# Patient Record
Sex: FEMALE | Race: WHITE | Employment: UNEMPLOYED | URBAN - METROPOLITAN AREA
[De-identification: names, ages, dates, MRNs, and addresses within clinical notes are randomized per-mention and may not be internally consistent; named-entity substitution may affect disease eponyms.]

---

## 2023-10-13 ENCOUNTER — ANESTHESIA (OUTPATIENT)
Dept: OPERATING ROOM | Facility: HOSPITAL | Age: 58
DRG: 331 | End: 2023-10-13
Payer: COMMERCIAL

## 2023-10-13 ENCOUNTER — HOSPITAL ENCOUNTER (INPATIENT)
Facility: HOSPITAL | Age: 58
LOS: 6 days | Discharge: HOME | DRG: 331 | End: 2023-10-20
Attending: EMERGENCY MEDICINE | Admitting: SURGERY
Payer: COMMERCIAL

## 2023-10-13 ENCOUNTER — ANESTHESIA EVENT (OUTPATIENT)
Dept: OPERATING ROOM | Facility: HOSPITAL | Age: 58
DRG: 331 | End: 2023-10-13
Payer: COMMERCIAL

## 2023-10-13 ENCOUNTER — APPOINTMENT (OUTPATIENT)
Dept: RADIOLOGY | Facility: HOSPITAL | Age: 58
DRG: 331 | End: 2023-10-13
Payer: COMMERCIAL

## 2023-10-13 DIAGNOSIS — K63.89 COLONIC MASS: ICD-10-CM

## 2023-10-13 DIAGNOSIS — K56.609 COLONIC OBSTRUCTION (MULTI): ICD-10-CM

## 2023-10-13 DIAGNOSIS — K56.609 LARGE BOWEL OBSTRUCTION (MULTI): Primary | ICD-10-CM

## 2023-10-13 DIAGNOSIS — K56.609: ICD-10-CM

## 2023-10-13 PROBLEM — E03.9 HYPOTHYROIDISM: Status: ACTIVE | Noted: 2023-10-13

## 2023-10-13 LAB
ALBUMIN SERPL BCP-MCNC: 4.7 G/DL (ref 3.4–5)
ALP SERPL-CCNC: 60 U/L (ref 33–110)
ALT SERPL W P-5'-P-CCNC: 6 U/L (ref 7–45)
ANION GAP SERPL CALC-SCNC: 14 MMOL/L (ref 10–20)
APPEARANCE UR: ABNORMAL
AST SERPL W P-5'-P-CCNC: 9 U/L (ref 9–39)
BASOPHILS # BLD AUTO: 0.04 X10*3/UL (ref 0–0.1)
BASOPHILS NFR BLD AUTO: 0.4 %
BILIRUB DIRECT SERPL-MCNC: 0.2 MG/DL (ref 0–0.3)
BILIRUB SERPL-MCNC: 1 MG/DL (ref 0–1.2)
BILIRUB UR STRIP.AUTO-MCNC: NEGATIVE MG/DL
BUN SERPL-MCNC: 29 MG/DL (ref 6–23)
CALCIUM SERPL-MCNC: 10.3 MG/DL (ref 8.6–10.3)
CHLORIDE SERPL-SCNC: 99 MMOL/L (ref 98–107)
CO2 SERPL-SCNC: 26 MMOL/L (ref 21–32)
COLOR UR: ABNORMAL
CREAT SERPL-MCNC: 0.6 MG/DL (ref 0.5–1.05)
EOSINOPHIL # BLD AUTO: 0.04 X10*3/UL (ref 0–0.7)
EOSINOPHIL NFR BLD AUTO: 0.4 %
ERYTHROCYTE [DISTWIDTH] IN BLOOD BY AUTOMATED COUNT: 14 % (ref 11.5–14.5)
GFR SERPL CREATININE-BSD FRML MDRD: >90 ML/MIN/1.73M*2
GLUCOSE SERPL-MCNC: 96 MG/DL (ref 74–99)
GLUCOSE UR STRIP.AUTO-MCNC: NEGATIVE MG/DL
HCT VFR BLD AUTO: 44.9 % (ref 36–46)
HGB BLD-MCNC: 15.1 G/DL (ref 12–16)
HOLD SPECIMEN: NORMAL
IMM GRANULOCYTES # BLD AUTO: 0.04 X10*3/UL (ref 0–0.7)
IMM GRANULOCYTES NFR BLD AUTO: 0.4 % (ref 0–0.9)
KETONES UR STRIP.AUTO-MCNC: ABNORMAL MG/DL
LEUKOCYTE ESTERASE UR QL STRIP.AUTO: NEGATIVE
LIPASE SERPL-CCNC: 27 U/L (ref 9–82)
LYMPHOCYTES # BLD AUTO: 1.91 X10*3/UL (ref 1.2–4.8)
LYMPHOCYTES NFR BLD AUTO: 18.3 %
MCH RBC QN AUTO: 29.2 PG (ref 26–34)
MCHC RBC AUTO-ENTMCNC: 33.6 G/DL (ref 32–36)
MCV RBC AUTO: 87 FL (ref 80–100)
MONOCYTES # BLD AUTO: 0.88 X10*3/UL (ref 0.1–1)
MONOCYTES NFR BLD AUTO: 8.4 %
MUCOUS THREADS #/AREA URNS AUTO: NORMAL /LPF
NEUTROPHILS # BLD AUTO: 7.55 X10*3/UL (ref 1.2–7.7)
NEUTROPHILS NFR BLD AUTO: 72.1 %
NITRITE UR QL STRIP.AUTO: NEGATIVE
NRBC BLD-RTO: 0 /100 WBCS (ref 0–0)
PH UR STRIP.AUTO: 5 [PH]
PLATELET # BLD AUTO: 380 X10*3/UL (ref 150–450)
PMV BLD AUTO: 8.7 FL (ref 7.5–11.5)
POTASSIUM SERPL-SCNC: 3.4 MMOL/L (ref 3.5–5.3)
PROT SERPL-MCNC: 7.8 G/DL (ref 6.4–8.2)
PROT UR STRIP.AUTO-MCNC: ABNORMAL MG/DL
RBC # BLD AUTO: 5.18 X10*6/UL (ref 4–5.2)
RBC # UR STRIP.AUTO: NEGATIVE /UL
RBC #/AREA URNS AUTO: NORMAL /HPF
SODIUM SERPL-SCNC: 136 MMOL/L (ref 136–145)
SP GR UR STRIP.AUTO: 1.03
SQUAMOUS #/AREA URNS AUTO: NORMAL /HPF
UROBILINOGEN UR STRIP.AUTO-MCNC: 2 MG/DL
WBC # BLD AUTO: 10.5 X10*3/UL (ref 4.4–11.3)
WBC #/AREA URNS AUTO: NORMAL /HPF

## 2023-10-13 PROCEDURE — 96376 TX/PRO/DX INJ SAME DRUG ADON: CPT

## 2023-10-13 PROCEDURE — 82248 BILIRUBIN DIRECT: CPT | Performed by: EMERGENCY MEDICINE

## 2023-10-13 PROCEDURE — 81001 URINALYSIS AUTO W/SCOPE: CPT | Performed by: EMERGENCY MEDICINE

## 2023-10-13 PROCEDURE — 99285 EMERGENCY DEPT VISIT HI MDM: CPT | Mod: 25 | Performed by: EMERGENCY MEDICINE

## 2023-10-13 PROCEDURE — 96374 THER/PROPH/DIAG INJ IV PUSH: CPT | Mod: 59

## 2023-10-13 PROCEDURE — 2500000004 HC RX 250 GENERAL PHARMACY W/ HCPCS (ALT 636 FOR OP/ED): Performed by: NURSE ANESTHETIST, CERTIFIED REGISTERED

## 2023-10-13 PROCEDURE — 96361 HYDRATE IV INFUSION ADD-ON: CPT

## 2023-10-13 PROCEDURE — 85025 COMPLETE CBC W/AUTO DIFF WBC: CPT | Performed by: EMERGENCY MEDICINE

## 2023-10-13 PROCEDURE — 2500000005 HC RX 250 GENERAL PHARMACY W/O HCPCS: Performed by: NURSE ANESTHETIST, CERTIFIED REGISTERED

## 2023-10-13 PROCEDURE — 3600000003 HC OR TIME - INITIAL BASE CHARGE - PROCEDURE LEVEL THREE: Performed by: SURGERY

## 2023-10-13 PROCEDURE — 2500000004 HC RX 250 GENERAL PHARMACY W/ HCPCS (ALT 636 FOR OP/ED): Performed by: EMERGENCY MEDICINE

## 2023-10-13 PROCEDURE — 74176 CT ABD & PELVIS W/O CONTRAST: CPT

## 2023-10-13 PROCEDURE — 81003 URINALYSIS AUTO W/O SCOPE: CPT | Performed by: EMERGENCY MEDICINE

## 2023-10-13 PROCEDURE — 7100000002 HC RECOVERY ROOM TIME - EACH INCREMENTAL 1 MINUTE: Performed by: SURGERY

## 2023-10-13 PROCEDURE — 3600000008 HC OR TIME - EACH INCREMENTAL 1 MINUTE - PROCEDURE LEVEL THREE: Performed by: SURGERY

## 2023-10-13 PROCEDURE — 80048 BASIC METABOLIC PNL TOTAL CA: CPT | Performed by: EMERGENCY MEDICINE

## 2023-10-13 PROCEDURE — C1725 CATH, TRANSLUMIN NON-LASER: HCPCS | Performed by: SURGERY

## 2023-10-13 PROCEDURE — 3700000002 HC GENERAL ANESTHESIA TIME - EACH INCREMENTAL 1 MINUTE: Performed by: SURGERY

## 2023-10-13 PROCEDURE — 74176 CT ABD & PELVIS W/O CONTRAST: CPT | Performed by: RADIOLOGY

## 2023-10-13 PROCEDURE — 2720000007 HC OR 272 NO HCPCS: Performed by: SURGERY

## 2023-10-13 PROCEDURE — 80053 COMPREHEN METABOLIC PANEL: CPT | Performed by: EMERGENCY MEDICINE

## 2023-10-13 PROCEDURE — 2580000001 HC RX 258 IV SOLUTIONS: Performed by: NURSE ANESTHETIST, CERTIFIED REGISTERED

## 2023-10-13 PROCEDURE — 96375 TX/PRO/DX INJ NEW DRUG ADDON: CPT

## 2023-10-13 PROCEDURE — 7100000001 HC RECOVERY ROOM TIME - INITIAL BASE CHARGE: Performed by: SURGERY

## 2023-10-13 PROCEDURE — 3700000001 HC GENERAL ANESTHESIA TIME - INITIAL BASE CHARGE: Performed by: SURGERY

## 2023-10-13 PROCEDURE — 2500000004 HC RX 250 GENERAL PHARMACY W/ HCPCS (ALT 636 FOR OP/ED): Performed by: SURGERY

## 2023-10-13 PROCEDURE — 96372 THER/PROPH/DIAG INJ SC/IM: CPT

## 2023-10-13 PROCEDURE — 83690 ASSAY OF LIPASE: CPT | Performed by: EMERGENCY MEDICINE

## 2023-10-13 PROCEDURE — 36415 COLL VENOUS BLD VENIPUNCTURE: CPT | Performed by: EMERGENCY MEDICINE

## 2023-10-13 RX ORDER — ONDANSETRON HYDROCHLORIDE 2 MG/ML
4 INJECTION, SOLUTION INTRAVENOUS ONCE
Status: COMPLETED | OUTPATIENT
Start: 2023-10-13 | End: 2023-10-13

## 2023-10-13 RX ORDER — DICYCLOMINE HYDROCHLORIDE 10 MG/ML
20 INJECTION INTRAMUSCULAR ONCE
Status: COMPLETED | OUTPATIENT
Start: 2023-10-13 | End: 2023-10-13

## 2023-10-13 RX ADMIN — PROPOFOL 200 MG: 10 INJECTION, EMULSION INTRAVENOUS at 23:50

## 2023-10-13 RX ADMIN — CEFOXITIN SODIUM 2 G: 2 POWDER, FOR SOLUTION INTRAVENOUS at 23:53

## 2023-10-13 RX ADMIN — LIDOCAINE HYDROCHLORIDE 60 MG: 20 INJECTION, SOLUTION INFILTRATION; PERINEURAL at 23:50

## 2023-10-13 RX ADMIN — SODIUM CHLORIDE, SODIUM LACTATE, POTASSIUM CHLORIDE, AND CALCIUM CHLORIDE: 600; 310; 30; 20 INJECTION, SOLUTION INTRAVENOUS at 23:45

## 2023-10-13 RX ADMIN — SODIUM CHLORIDE 1000 ML: 9 INJECTION, SOLUTION INTRAVENOUS at 18:44

## 2023-10-13 RX ADMIN — FENTANYL CITRATE 50 MCG: 50 INJECTION, SOLUTION INTRAMUSCULAR; INTRAVENOUS at 23:50

## 2023-10-13 RX ADMIN — ONDANSETRON 4 MG: 2 INJECTION INTRAMUSCULAR; INTRAVENOUS at 18:44

## 2023-10-13 RX ADMIN — DICYCLOMINE HYDROCHLORIDE 20 MG: 10 INJECTION, SOLUTION INTRAMUSCULAR at 19:11

## 2023-10-13 RX ADMIN — SODIUM CHLORIDE 1000 ML: 9 INJECTION, SOLUTION INTRAVENOUS at 20:11

## 2023-10-13 RX ADMIN — ROCURONIUM BROMIDE 50 MG: 50 INJECTION INTRAVENOUS at 23:50

## 2023-10-13 SDOH — HEALTH STABILITY: MENTAL HEALTH: CURRENT SMOKER: 0

## 2023-10-13 ASSESSMENT — PAIN DESCRIPTION - LOCATION
LOCATION: ABDOMEN
LOCATION: ABDOMEN

## 2023-10-13 ASSESSMENT — PAIN DESCRIPTION - PROGRESSION
CLINICAL_PROGRESSION: NOT CHANGED
CLINICAL_PROGRESSION: GRADUALLY WORSENING
CLINICAL_PROGRESSION: GRADUALLY IMPROVING

## 2023-10-13 ASSESSMENT — ENCOUNTER SYMPTOMS
VOMITING: 1
NAUSEA: 1
ABDOMINAL PAIN: 1
FATIGUE: 1
CHILLS: 0
APPETITE CHANGE: 1
ABDOMINAL DISTENTION: 1
CONSTIPATION: 1
FEVER: 0
ANAL BLEEDING: 1

## 2023-10-13 ASSESSMENT — LIFESTYLE VARIABLES
REASON UNABLE TO ASSESS: YES
EVER FELT BAD OR GUILTY ABOUT YOUR DRINKING: NO
EVER HAD A DRINK FIRST THING IN THE MORNING TO STEADY YOUR NERVES TO GET RID OF A HANGOVER: NO
HAVE PEOPLE ANNOYED YOU BY CRITICIZING YOUR DRINKING: NO
HAVE YOU EVER FELT YOU SHOULD CUT DOWN ON YOUR DRINKING: NO

## 2023-10-13 ASSESSMENT — PAIN DESCRIPTION - PAIN TYPE: TYPE: ACUTE PAIN

## 2023-10-13 ASSESSMENT — COLUMBIA-SUICIDE SEVERITY RATING SCALE - C-SSRS
6. HAVE YOU EVER DONE ANYTHING, STARTED TO DO ANYTHING, OR PREPARED TO DO ANYTHING TO END YOUR LIFE?: NO
2. HAVE YOU ACTUALLY HAD ANY THOUGHTS OF KILLING YOURSELF?: NO
1. IN THE PAST MONTH, HAVE YOU WISHED YOU WERE DEAD OR WISHED YOU COULD GO TO SLEEP AND NOT WAKE UP?: NO

## 2023-10-13 ASSESSMENT — PAIN DESCRIPTION - DESCRIPTORS
DESCRIPTORS: ACHING;SHARP
DESCRIPTORS: SHARP
DESCRIPTORS: PRESSURE

## 2023-10-13 ASSESSMENT — PAIN - FUNCTIONAL ASSESSMENT
PAIN_FUNCTIONAL_ASSESSMENT: 0-10

## 2023-10-13 ASSESSMENT — ACTIVITIES OF DAILY LIVING (ADL): EFFECT OF PAIN ON DAILY ACTIVITIES: DECREASED

## 2023-10-13 ASSESSMENT — PAIN SCALES - GENERAL
PAINLEVEL_OUTOF10: 5 - MODERATE PAIN
PAINLEVEL_OUTOF10: 3
PAINLEVEL_OUTOF10: 5 - MODERATE PAIN
PAINLEVEL_OUTOF10: 5 - MODERATE PAIN

## 2023-10-13 ASSESSMENT — PAIN DESCRIPTION - FREQUENCY: FREQUENCY: INTERMITTENT

## 2023-10-13 ASSESSMENT — PAIN DESCRIPTION - ORIENTATION: ORIENTATION: RIGHT;LEFT;LOWER

## 2023-10-13 ASSESSMENT — PAIN DESCRIPTION - ONSET: ONSET: GRADUAL

## 2023-10-13 NOTE — ED PROVIDER NOTES
HPI   Chief Complaint   Patient presents with   • Vomiting Blood   • Abdominal Pain     Onset Tuesday.  Multiple dark colored emesis.  States feels weak.         Patient has abdominal pain and vomiting.  The abdominal pain started 6 days ago.  She describes cramping pains across her lower abdomen.  She states she has had increased noise from her abdomen.  She has had nausea vomiting.  Earlier this week the vomit was darker that she was concerned about blood.  No dysuria hematuria.  No pain in her back.  No previous abdominal surgeries.  She has been taking small sips of water and and has been able to keep it down recently.  She did have sick contacts as her granddaughter was sick.  She thought she did just have symptom viral but things have not improved.                          No data recorded                Patient History   Past Medical History:   Diagnosis Date   • Other specified disorders of the skin and subcutaneous tissue 04/13/2019    Morgellons syndrome   • Other specified disorders of the skin and subcutaneous tissue 04/23/2019    Morgellons syndrome     Past Surgical History:   Procedure Laterality Date   • OTHER SURGICAL HISTORY  09/24/2019    No history of surgery     No family history on file.  Social History     Tobacco Use   • Smoking status: Not on file   • Smokeless tobacco: Not on file   Substance Use Topics   • Alcohol use: Not on file   • Drug use: Not on file       Physical Exam   ED Triage Vitals [10/13/23 1637]   Temp Heart Rate Resp BP   36.4 °C (97.5 °F) 104 16 132/89      SpO2 Temp src Heart Rate Source Patient Position   98 % -- -- --      BP Location FiO2 (%)     -- --       Physical Exam  Vitals and nursing note reviewed.   Constitutional:       Appearance: Normal appearance.   HENT:      Head: Normocephalic and atraumatic.      Mouth/Throat:      Mouth: Mucous membranes are moist.   Eyes:      Extraocular Movements: Extraocular movements intact.      Pupils: Pupils are equal, round,  and reactive to light.   Cardiovascular:      Rate and Rhythm: Normal rate and regular rhythm.      Heart sounds: No murmur heard.  Pulmonary:      Effort: Pulmonary effort is normal. No respiratory distress.      Breath sounds: Normal breath sounds.   Abdominal:      General: There is no distension.      Palpations: Abdomen is soft.      Tenderness: There is abdominal tenderness (Mild lower diffuse).      Hernia: No hernia is present.   Musculoskeletal:         General: No tenderness or deformity. Normal range of motion.      Cervical back: Neck supple.      Right lower leg: No edema.      Left lower leg: No edema.   Skin:     General: Skin is warm and dry.      Findings: No lesion or rash.   Neurological:      General: No focal deficit present.      Mental Status: She is alert and oriented to person, place, and time.      Sensory: No sensory deficit.      Motor: No weakness.   Psychiatric:         Behavior: Behavior normal.       Labs Reviewed   LIPASE   HEPATIC FUNCTION PANEL   BASIC METABOLIC PANEL   CBC WITH AUTO DIFFERENTIAL   URINALYSIS WITH REFLEX MICROSCOPIC AND CULTURE    Narrative:     The following orders were created for panel order Urinalysis with Reflex Microscopic and Culture.  Procedure                               Abnormality         Status                     ---------                               -----------         ------                     Urinalysis with Reflex M...[[id number]]                                                 Extra Urine Gray Tube[[id number]]                                                         Please view results for these tests on the individual orders.   URINALYSIS WITH REFLEX MICROSCOPIC AND CULTURE   EXTRA URINE GRAY TUBE     CT abdomen pelvis wo IV contrast    (Results Pending)     ED Course & MDM   Diagnoses as of 10/13/23 2106   Colonic mass   Colonic obstruction (CMS/HCC)       Medical Decision Making  Patient received Bentyl, Zofran as well as normal saline.   Laboratory studies are unremarkable except for potassium 3.4.  Urinalysis is negative for infection.  CAT scan of the abdomen pelvis shows a colonic obstruction with dilation of the colon with a transition point at the sigmoid colon where there is a colonic mass concerning for malignancy.  This was seen on a CAT scan in December 2022.  The patient did follow-up after this CAT scan and had a colonoscopy.  She states that the biopsy was negative but the doctor wanted to treat her as if it was cancerous.  However the patient did not follow-up after this.  I discussed this with Dr. Nguyen, on-call with surgery.  She reviewed the images and recommended transfer for colorectal surgery.  Transfer is currently pending.  The on-call physician is going to discuss with the transfer center for disposition.        Procedure  Procedures     Tarun Fang MD  10/13/23 0927

## 2023-10-14 LAB
ANION GAP SERPL CALC-SCNC: 14 MMOL/L (ref 10–20)
BUN SERPL-MCNC: 22 MG/DL (ref 6–23)
CALCIUM SERPL-MCNC: 7.7 MG/DL (ref 8.6–10.3)
CHLORIDE SERPL-SCNC: 110 MMOL/L (ref 98–107)
CO2 SERPL-SCNC: 19 MMOL/L (ref 21–32)
CREAT SERPL-MCNC: 0.43 MG/DL (ref 0.5–1.05)
ERYTHROCYTE [DISTWIDTH] IN BLOOD BY AUTOMATED COUNT: 14 % (ref 11.5–14.5)
GFR SERPL CREATININE-BSD FRML MDRD: >90 ML/MIN/1.73M*2
GLUCOSE SERPL-MCNC: 90 MG/DL (ref 74–99)
HCT VFR BLD AUTO: 39.5 % (ref 36–46)
HGB BLD-MCNC: 12.8 G/DL (ref 12–16)
MCH RBC QN AUTO: 28.8 PG (ref 26–34)
MCHC RBC AUTO-ENTMCNC: 32.4 G/DL (ref 32–36)
MCV RBC AUTO: 89 FL (ref 80–100)
NRBC BLD-RTO: 0 /100 WBCS (ref 0–0)
PLATELET # BLD AUTO: 339 X10*3/UL (ref 150–450)
PMV BLD AUTO: 9.5 FL (ref 7.5–11.5)
POTASSIUM SERPL-SCNC: 3.8 MMOL/L (ref 3.5–5.3)
RBC # BLD AUTO: 4.44 X10*6/UL (ref 4–5.2)
SODIUM SERPL-SCNC: 139 MMOL/L (ref 136–145)
WBC # BLD AUTO: 14 X10*3/UL (ref 4.4–11.3)

## 2023-10-14 PROCEDURE — 2580000001 HC RX 258 IV SOLUTIONS: Performed by: ANESTHESIOLOGY

## 2023-10-14 PROCEDURE — 87205 SMEAR GRAM STAIN: CPT | Mod: CMCLAB,PORLAB | Performed by: SURGERY

## 2023-10-14 PROCEDURE — 2500000004 HC RX 250 GENERAL PHARMACY W/ HCPCS (ALT 636 FOR OP/ED): Performed by: ANESTHESIOLOGY

## 2023-10-14 PROCEDURE — 2500000004 HC RX 250 GENERAL PHARMACY W/ HCPCS (ALT 636 FOR OP/ED): Performed by: NURSE ANESTHETIST, CERTIFIED REGISTERED

## 2023-10-14 PROCEDURE — 0DBH0ZX EXCISION OF CECUM, OPEN APPROACH, DIAGNOSTIC: ICD-10-PCS | Performed by: SURGERY

## 2023-10-14 PROCEDURE — 2500000001 HC RX 250 WO HCPCS SELF ADMINISTERED DRUGS (ALT 637 FOR MEDICARE OP): Performed by: SURGERY

## 2023-10-14 PROCEDURE — 88305 TISSUE EXAM BY PATHOLOGIST: CPT | Mod: TC,SUR | Performed by: SURGERY

## 2023-10-14 PROCEDURE — 1210000001 HC SEMI-PRIVATE ROOM DAILY

## 2023-10-14 PROCEDURE — 0D1L0Z4 BYPASS TRANSVERSE COLON TO CUTANEOUS, OPEN APPROACH: ICD-10-PCS | Performed by: SURGERY

## 2023-10-14 PROCEDURE — 88305 TISSUE EXAM BY PATHOLOGIST: CPT | Mod: TC | Performed by: SURGERY

## 2023-10-14 PROCEDURE — 2500000005 HC RX 250 GENERAL PHARMACY W/O HCPCS: Performed by: NURSE ANESTHETIST, CERTIFIED REGISTERED

## 2023-10-14 PROCEDURE — 2500000004 HC RX 250 GENERAL PHARMACY W/ HCPCS (ALT 636 FOR OP/ED): Performed by: SURGERY

## 2023-10-14 PROCEDURE — 2500000005 HC RX 250 GENERAL PHARMACY W/O HCPCS: Performed by: SURGERY

## 2023-10-14 PROCEDURE — 36415 COLL VENOUS BLD VENIPUNCTURE: CPT | Performed by: SURGERY

## 2023-10-14 PROCEDURE — 85027 COMPLETE CBC AUTOMATED: CPT | Performed by: SURGERY

## 2023-10-14 PROCEDURE — 88305 TISSUE EXAM BY PATHOLOGIST: CPT | Performed by: PATHOLOGY

## 2023-10-14 PROCEDURE — 2580000001 HC RX 258 IV SOLUTIONS: Performed by: SURGERY

## 2023-10-14 PROCEDURE — 87070 CULTURE OTHR SPECIMN AEROBIC: CPT | Mod: CMCLAB,PORLAB | Performed by: SURGERY

## 2023-10-14 PROCEDURE — 80048 BASIC METABOLIC PNL TOTAL CA: CPT | Performed by: SURGERY

## 2023-10-14 RX ORDER — ALBUTEROL SULFATE 0.83 MG/ML
2.5 SOLUTION RESPIRATORY (INHALATION) ONCE AS NEEDED
Status: DISCONTINUED | OUTPATIENT
Start: 2023-10-14 | End: 2023-10-14 | Stop reason: HOSPADM

## 2023-10-14 RX ORDER — OXYCODONE AND ACETAMINOPHEN 5; 325 MG/1; MG/1
1 TABLET ORAL EVERY 4 HOURS PRN
Status: DISCONTINUED | OUTPATIENT
Start: 2023-10-14 | End: 2023-10-14 | Stop reason: HOSPADM

## 2023-10-14 RX ORDER — DROPERIDOL 2.5 MG/ML
0.62 INJECTION, SOLUTION INTRAMUSCULAR; INTRAVENOUS ONCE AS NEEDED
Status: DISCONTINUED | OUTPATIENT
Start: 2023-10-14 | End: 2023-10-14 | Stop reason: HOSPADM

## 2023-10-14 RX ORDER — DIPHENHYDRAMINE HYDROCHLORIDE 50 MG/ML
12.5 INJECTION INTRAMUSCULAR; INTRAVENOUS ONCE AS NEEDED
Status: DISCONTINUED | OUTPATIENT
Start: 2023-10-14 | End: 2023-10-14 | Stop reason: HOSPADM

## 2023-10-14 RX ORDER — HYDROMORPHONE HYDROCHLORIDE 2 MG/ML
INJECTION, SOLUTION INTRAMUSCULAR; INTRAVENOUS; SUBCUTANEOUS AS NEEDED
Status: DISCONTINUED | OUTPATIENT
Start: 2023-10-14 | End: 2023-10-14

## 2023-10-14 RX ORDER — HYDRALAZINE HYDROCHLORIDE 20 MG/ML
5 INJECTION INTRAMUSCULAR; INTRAVENOUS EVERY 30 MIN PRN
Status: DISCONTINUED | OUTPATIENT
Start: 2023-10-14 | End: 2023-10-14 | Stop reason: HOSPADM

## 2023-10-14 RX ORDER — FENTANYL CITRATE 50 UG/ML
INJECTION, SOLUTION INTRAMUSCULAR; INTRAVENOUS AS NEEDED
Status: DISCONTINUED | OUTPATIENT
Start: 2023-10-13 | End: 2023-10-14

## 2023-10-14 RX ORDER — SODIUM CHLORIDE, SODIUM LACTATE, POTASSIUM CHLORIDE, CALCIUM CHLORIDE 600; 310; 30; 20 MG/100ML; MG/100ML; MG/100ML; MG/100ML
100 INJECTION, SOLUTION INTRAVENOUS CONTINUOUS
Status: CANCELLED | OUTPATIENT
Start: 2023-10-14

## 2023-10-14 RX ORDER — KETOROLAC TROMETHAMINE 30 MG/ML
30 INJECTION, SOLUTION INTRAMUSCULAR; INTRAVENOUS EVERY 6 HOURS PRN
Status: DISCONTINUED | OUTPATIENT
Start: 2023-10-14 | End: 2023-10-14

## 2023-10-14 RX ORDER — SODIUM CHLORIDE, SODIUM LACTATE, POTASSIUM CHLORIDE, CALCIUM CHLORIDE 600; 310; 30; 20 MG/100ML; MG/100ML; MG/100ML; MG/100ML
100 INJECTION, SOLUTION INTRAVENOUS CONTINUOUS
Status: DISCONTINUED | OUTPATIENT
Start: 2023-10-14 | End: 2023-10-14

## 2023-10-14 RX ORDER — PROPOFOL 10 MG/ML
INJECTION, EMULSION INTRAVENOUS AS NEEDED
Status: DISCONTINUED | OUTPATIENT
Start: 2023-10-13 | End: 2023-10-14

## 2023-10-14 RX ORDER — KETOROLAC TROMETHAMINE 30 MG/ML
30 INJECTION, SOLUTION INTRAMUSCULAR; INTRAVENOUS EVERY 6 HOURS
Status: COMPLETED | OUTPATIENT
Start: 2023-10-14 | End: 2023-10-15

## 2023-10-14 RX ORDER — ACETAMINOPHEN 325 MG/1
650 TABLET ORAL EVERY 4 HOURS PRN
Status: DISCONTINUED | OUTPATIENT
Start: 2023-10-14 | End: 2023-10-16

## 2023-10-14 RX ORDER — FAMOTIDINE 10 MG/ML
20 INJECTION INTRAVENOUS ONCE
Status: CANCELLED | OUTPATIENT
Start: 2023-10-14 | End: 2023-10-14

## 2023-10-14 RX ORDER — ROCURONIUM BROMIDE 10 MG/ML
INJECTION, SOLUTION INTRAVENOUS AS NEEDED
Status: DISCONTINUED | OUTPATIENT
Start: 2023-10-13 | End: 2023-10-14

## 2023-10-14 RX ORDER — SODIUM CHLORIDE, SODIUM LACTATE, POTASSIUM CHLORIDE, CALCIUM CHLORIDE 600; 310; 30; 20 MG/100ML; MG/100ML; MG/100ML; MG/100ML
50 INJECTION, SOLUTION INTRAVENOUS CONTINUOUS
Status: DISCONTINUED | OUTPATIENT
Start: 2023-10-14 | End: 2023-10-18

## 2023-10-14 RX ORDER — MORPHINE SULFATE 4 MG/ML
4 INJECTION, SOLUTION INTRAMUSCULAR; INTRAVENOUS EVERY 4 HOURS PRN
Status: DISCONTINUED | OUTPATIENT
Start: 2023-10-14 | End: 2023-10-14 | Stop reason: ALTCHOICE

## 2023-10-14 RX ORDER — LABETALOL HYDROCHLORIDE 5 MG/ML
5 INJECTION, SOLUTION INTRAVENOUS ONCE AS NEEDED
Status: DISCONTINUED | OUTPATIENT
Start: 2023-10-14 | End: 2023-10-14 | Stop reason: HOSPADM

## 2023-10-14 RX ORDER — HYDROMORPHONE HYDROCHLORIDE 1 MG/ML
0.6 INJECTION, SOLUTION INTRAMUSCULAR; INTRAVENOUS; SUBCUTANEOUS
Status: DISCONTINUED | OUTPATIENT
Start: 2023-10-14 | End: 2023-10-20 | Stop reason: HOSPADM

## 2023-10-14 RX ORDER — MORPHINE SULFATE 2 MG/ML
2 INJECTION, SOLUTION INTRAMUSCULAR; INTRAVENOUS EVERY 5 MIN PRN
Status: DISCONTINUED | OUTPATIENT
Start: 2023-10-14 | End: 2023-10-14 | Stop reason: HOSPADM

## 2023-10-14 RX ORDER — LIDOCAINE HYDROCHLORIDE 10 MG/ML
0.1 INJECTION, SOLUTION EPIDURAL; INFILTRATION; INTRACAUDAL; PERINEURAL ONCE
Status: DISCONTINUED | OUTPATIENT
Start: 2023-10-14 | End: 2023-10-14 | Stop reason: HOSPADM

## 2023-10-14 RX ORDER — ONDANSETRON HYDROCHLORIDE 2 MG/ML
4 INJECTION, SOLUTION INTRAVENOUS ONCE AS NEEDED
Status: DISCONTINUED | OUTPATIENT
Start: 2023-10-14 | End: 2023-10-14 | Stop reason: HOSPADM

## 2023-10-14 RX ORDER — SODIUM CHLORIDE 0.9 % (FLUSH) 0.9 %
SYRINGE (ML) INJECTION
Status: COMPLETED
Start: 2023-10-14 | End: 2023-10-14

## 2023-10-14 RX ORDER — LIDOCAINE HYDROCHLORIDE 20 MG/ML
INJECTION, SOLUTION INFILTRATION; PERINEURAL AS NEEDED
Status: DISCONTINUED | OUTPATIENT
Start: 2023-10-13 | End: 2023-10-14

## 2023-10-14 RX ORDER — ONDANSETRON HYDROCHLORIDE 2 MG/ML
INJECTION, SOLUTION INTRAVENOUS AS NEEDED
Status: DISCONTINUED | OUTPATIENT
Start: 2023-10-14 | End: 2023-10-14

## 2023-10-14 RX ORDER — BUPIVACAINE HYDROCHLORIDE 2.5 MG/ML
INJECTION, SOLUTION INFILTRATION; PERINEURAL AS NEEDED
Status: DISCONTINUED | OUTPATIENT
Start: 2023-10-14 | End: 2023-10-14 | Stop reason: HOSPADM

## 2023-10-14 RX ORDER — MORPHINE SULFATE 4 MG/ML
4 INJECTION INTRAVENOUS EVERY 4 HOURS PRN
Status: DISCONTINUED | OUTPATIENT
Start: 2023-10-14 | End: 2023-10-14

## 2023-10-14 RX ORDER — SIMETHICONE 80 MG
80 TABLET,CHEWABLE ORAL 4 TIMES DAILY PRN
Status: DISCONTINUED | OUTPATIENT
Start: 2023-10-14 | End: 2023-10-20 | Stop reason: HOSPADM

## 2023-10-14 RX ADMIN — MORPHINE SULFATE 4 MG: 4 INJECTION INTRAVENOUS at 16:44

## 2023-10-14 RX ADMIN — HYDROMORPHONE HYDROCHLORIDE 0.5 MG: 2 INJECTION, SOLUTION INTRAMUSCULAR; INTRAVENOUS; SUBCUTANEOUS at 01:16

## 2023-10-14 RX ADMIN — ONDANSETRON 4 MG: 2 INJECTION INTRAMUSCULAR; INTRAVENOUS at 00:42

## 2023-10-14 RX ADMIN — SODIUM CHLORIDE, POTASSIUM CHLORIDE, SODIUM LACTATE AND CALCIUM CHLORIDE 100 ML/HR: 600; 310; 30; 20 INJECTION, SOLUTION INTRAVENOUS at 03:31

## 2023-10-14 RX ADMIN — HYDROMORPHONE HYDROCHLORIDE 0.5 MG: 2 INJECTION, SOLUTION INTRAMUSCULAR; INTRAVENOUS; SUBCUTANEOUS at 01:09

## 2023-10-14 RX ADMIN — CEFOXITIN SODIUM 2 G: 2 POWDER, FOR SOLUTION INTRAVENOUS at 19:23

## 2023-10-14 RX ADMIN — CEFOXITIN SODIUM 2 G: 2 POWDER, FOR SOLUTION INTRAVENOUS at 11:45

## 2023-10-14 RX ADMIN — FENTANYL CITRATE 50 MCG: 50 INJECTION, SOLUTION INTRAMUSCULAR; INTRAVENOUS at 00:02

## 2023-10-14 RX ADMIN — SIMETHICONE 80 MG: 80 TABLET, CHEWABLE ORAL at 20:14

## 2023-10-14 RX ADMIN — HYDROMORPHONE HYDROCHLORIDE 0.5 MG: 0.5 INJECTION, SOLUTION INTRAMUSCULAR; INTRAVENOUS; SUBCUTANEOUS at 02:10

## 2023-10-14 RX ADMIN — SODIUM CHLORIDE, POTASSIUM CHLORIDE, SODIUM LACTATE AND CALCIUM CHLORIDE 100 ML/HR: 600; 310; 30; 20 INJECTION, SOLUTION INTRAVENOUS at 01:53

## 2023-10-14 RX ADMIN — SODIUM CHLORIDE, POTASSIUM CHLORIDE, SODIUM LACTATE AND CALCIUM CHLORIDE 1000 ML: 600; 310; 30; 20 INJECTION, SOLUTION INTRAVENOUS at 20:15

## 2023-10-14 RX ADMIN — KETOROLAC TROMETHAMINE 30 MG: 30 INJECTION, SOLUTION INTRAMUSCULAR at 22:04

## 2023-10-14 RX ADMIN — HYDROMORPHONE HYDROCHLORIDE 0.5 MG: 0.5 INJECTION, SOLUTION INTRAMUSCULAR; INTRAVENOUS; SUBCUTANEOUS at 01:42

## 2023-10-14 RX ADMIN — CEFOXITIN SODIUM 2 G: 2 POWDER, FOR SOLUTION INTRAVENOUS at 05:38

## 2023-10-14 RX ADMIN — MORPHINE SULFATE 4 MG: 4 INJECTION INTRAVENOUS at 09:49

## 2023-10-14 RX ADMIN — SODIUM CHLORIDE, POTASSIUM CHLORIDE, SODIUM LACTATE AND CALCIUM CHLORIDE 100 ML/HR: 600; 310; 30; 20 INJECTION, SOLUTION INTRAVENOUS at 16:51

## 2023-10-14 RX ADMIN — HYDROMORPHONE HYDROCHLORIDE 0.6 MG: 1 INJECTION, SOLUTION INTRAMUSCULAR; INTRAVENOUS; SUBCUTANEOUS at 20:14

## 2023-10-14 RX ADMIN — HYDROMORPHONE HYDROCHLORIDE 0.5 MG: 0.5 INJECTION, SOLUTION INTRAMUSCULAR; INTRAVENOUS; SUBCUTANEOUS at 01:52

## 2023-10-14 RX ADMIN — HYDROMORPHONE HYDROCHLORIDE 0.5 MG: 0.5 INJECTION, SOLUTION INTRAMUSCULAR; INTRAVENOUS; SUBCUTANEOUS at 01:33

## 2023-10-14 RX ADMIN — SUGAMMADEX 150 MG: 100 INJECTION, SOLUTION INTRAVENOUS at 01:03

## 2023-10-14 RX ADMIN — DEXAMETHASONE SODIUM PHOSPHATE 4 MG: 4 INJECTION, SOLUTION INTRAMUSCULAR; INTRAVENOUS at 00:42

## 2023-10-14 RX ADMIN — Medication: at 19:00

## 2023-10-14 SDOH — SOCIAL STABILITY: SOCIAL INSECURITY: ARE THERE ANY APPARENT SIGNS OF INJURIES/BEHAVIORS THAT COULD BE RELATED TO ABUSE/NEGLECT?: NO

## 2023-10-14 SDOH — SOCIAL STABILITY: SOCIAL INSECURITY: DO YOU FEEL ANYONE HAS EXPLOITED OR TAKEN ADVANTAGE OF YOU FINANCIALLY OR OF YOUR PERSONAL PROPERTY?: NO

## 2023-10-14 SDOH — SOCIAL STABILITY: SOCIAL INSECURITY: DO YOU FEEL UNSAFE GOING BACK TO THE PLACE WHERE YOU ARE LIVING?: NO

## 2023-10-14 SDOH — SOCIAL STABILITY: SOCIAL INSECURITY: HAVE YOU HAD THOUGHTS OF HARMING ANYONE ELSE?: NO

## 2023-10-14 SDOH — SOCIAL STABILITY: SOCIAL INSECURITY: ABUSE: ADULT

## 2023-10-14 SDOH — SOCIAL STABILITY: SOCIAL INSECURITY: DOES ANYONE TRY TO KEEP YOU FROM HAVING/CONTACTING OTHER FRIENDS OR DOING THINGS OUTSIDE YOUR HOME?: NO

## 2023-10-14 SDOH — SOCIAL STABILITY: SOCIAL INSECURITY: HAS ANYONE EVER THREATENED TO HURT YOUR FAMILY OR YOUR PETS?: NO

## 2023-10-14 ASSESSMENT — COGNITIVE AND FUNCTIONAL STATUS - GENERAL
DRESSING REGULAR LOWER BODY CLOTHING: A LOT
TURNING FROM BACK TO SIDE WHILE IN FLAT BAD: A LITTLE
MOVING FROM LYING ON BACK TO SITTING ON SIDE OF FLAT BED WITH BEDRAILS: A LITTLE
TURNING FROM BACK TO SIDE WHILE IN FLAT BAD: A LITTLE
TOILETING: A LITTLE
MOVING TO AND FROM BED TO CHAIR: A LITTLE
WALKING IN HOSPITAL ROOM: A LITTLE
WALKING IN HOSPITAL ROOM: A LOT
DAILY ACTIVITIY SCORE: 19
CLIMB 3 TO 5 STEPS WITH RAILING: A LITTLE
DAILY ACTIVITIY SCORE: 19
DRESSING REGULAR UPPER BODY CLOTHING: A LOT
HELP NEEDED FOR BATHING: A LITTLE
HELP NEEDED FOR BATHING: A LOT
DRESSING REGULAR LOWER BODY CLOTHING: A LITTLE
TOILETING: A LITTLE
MOBILITY SCORE: 18
STANDING UP FROM CHAIR USING ARMS: A LITTLE
MOVING TO AND FROM BED TO CHAIR: A LITTLE
CLIMB 3 TO 5 STEPS WITH RAILING: A LITTLE
EATING MEALS: A LITTLE
MOBILITY SCORE: 18
WALKING IN HOSPITAL ROOM: A LITTLE
HELP NEEDED FOR BATHING: A LITTLE
PERSONAL GROOMING: A LITTLE
DRESSING REGULAR UPPER BODY CLOTHING: A LITTLE
MOVING TO AND FROM BED TO CHAIR: A LITTLE
DRESSING REGULAR LOWER BODY CLOTHING: A LITTLE
CLIMB 3 TO 5 STEPS WITH RAILING: A LOT
PATIENT BASELINE BEDBOUND: NO
PERSONAL GROOMING: A LITTLE
DAILY ACTIVITIY SCORE: 12
MOVING FROM LYING ON BACK TO SITTING ON SIDE OF FLAT BED WITH BEDRAILS: A LITTLE
DRESSING REGULAR UPPER BODY CLOTHING: A LITTLE
STANDING UP FROM CHAIR USING ARMS: A LITTLE
STANDING UP FROM CHAIR USING ARMS: A LITTLE
TOILETING: TOTAL
PERSONAL GROOMING: A LOT
TURNING FROM BACK TO SIDE WHILE IN FLAT BAD: A LITTLE
MOBILITY SCORE: 17

## 2023-10-14 ASSESSMENT — PAIN SCALES - GENERAL
PAINLEVEL_OUTOF10: 8
PAINLEVEL_OUTOF10: 8
PAINLEVEL_OUTOF10: 3
PAINLEVEL_OUTOF10: 9
PAINLEVEL_OUTOF10: 9
PAINLEVEL_OUTOF10: 8
PAINLEVEL_OUTOF10: 5 - MODERATE PAIN
PAINLEVEL_OUTOF10: 8
PAIN_LEVEL: 2
PAINLEVEL_OUTOF10: 7
PAINLEVEL_OUTOF10: 0 - NO PAIN
PAINLEVEL_OUTOF10: 7

## 2023-10-14 ASSESSMENT — PATIENT HEALTH QUESTIONNAIRE - PHQ9
1. LITTLE INTEREST OR PLEASURE IN DOING THINGS: NOT AT ALL
2. FEELING DOWN, DEPRESSED OR HOPELESS: NOT AT ALL
SUM OF ALL RESPONSES TO PHQ9 QUESTIONS 1 & 2: 0

## 2023-10-14 ASSESSMENT — ACTIVITIES OF DAILY LIVING (ADL)
FEEDING YOURSELF: INDEPENDENT
DRESSING YOURSELF: INDEPENDENT
ADEQUATE_TO_COMPLETE_ADL: YES
LACK_OF_TRANSPORTATION: NO
WALKS IN HOME: INDEPENDENT
TOILETING: INDEPENDENT
BATHING: INDEPENDENT
PATIENT'S MEMORY ADEQUATE TO SAFELY COMPLETE DAILY ACTIVITIES?: YES
GROOMING: INDEPENDENT
HEARING - LEFT EAR: FUNCTIONAL
HEARING - RIGHT EAR: FUNCTIONAL
JUDGMENT_ADEQUATE_SAFELY_COMPLETE_DAILY_ACTIVITIES: YES

## 2023-10-14 ASSESSMENT — LIFESTYLE VARIABLES
PRESCIPTION_ABUSE_PAST_12_MONTHS: NO
SUBSTANCE_ABUSE_PAST_12_MONTHS: NO
HOW MANY STANDARD DRINKS CONTAINING ALCOHOL DO YOU HAVE ON A TYPICAL DAY: PATIENT DOES NOT DRINK
HOW OFTEN DO YOU HAVE 6 OR MORE DRINKS ON ONE OCCASION: NEVER
AUDIT-C TOTAL SCORE: 0
AUDIT-C TOTAL SCORE: 0
SKIP TO QUESTIONS 9-10: 1
HOW OFTEN DO YOU HAVE A DRINK CONTAINING ALCOHOL: NEVER

## 2023-10-14 ASSESSMENT — PAIN - FUNCTIONAL ASSESSMENT
PAIN_FUNCTIONAL_ASSESSMENT: 0-10

## 2023-10-14 NOTE — CARE PLAN
Problem: Skin  Goal: Decreased wound size/increased tissue granulation at next dressing change  Outcome: Progressing  Goal: Participates in plan/prevention/treatment measures  Outcome: Progressing  Goal: Prevent/manage excess moisture  Outcome: Progressing  Goal: Prevent/minimize sheer/friction injuries  Outcome: Progressing  Goal: Promote/optimize nutrition  Outcome: Progressing  Flowsheets (Taken 10/14/2023 0619)  Promote/optimize nutrition: Monitor/record intake including meals  Goal: Promote skin healing  Outcome: Progressing     Problem: Fall/Injury  Goal: Not fall by end of shift  Outcome: Progressing  Goal: Be free from injury by end of the shift  Outcome: Progressing  Goal: Verbalize understanding of personal risk factors for fall in the hospital  Outcome: Progressing  Goal: Verbalize understanding of risk factor reduction measures to prevent injury from fall in the home  Outcome: Progressing  Goal: Use assistive devices by end of the shift  Outcome: Progressing  Goal: Pace activities to prevent fatigue by end of the shift  Outcome: Progressing     Problem: Pain  Goal: Takes deep breaths with improved pain control throughout the shift  Outcome: Progressing  Goal: Turns in bed with improved pain control throughout the shift  Outcome: Progressing  Goal: Walks with improved pain control throughout the shift  Outcome: Progressing  Goal: Performs ADL's with improved pain control throughout shift  Outcome: Progressing  Goal: Participates in PT with improved pain control throughout the shift  Outcome: Progressing  Goal: Free from opioid side effects throughout the shift  Outcome: Progressing  Goal: Free from acute confusion related to pain meds throughout the shift  Outcome: Progressing     Problem: Pain - Adult  Goal: Verbalizes/displays adequate comfort level or baseline comfort level  Outcome: Progressing     Problem: Safety - Adult  Goal: Free from fall injury  Outcome: Progressing     Problem: Discharge  Planning  Goal: Discharge to home or other facility with appropriate resources  Outcome: Progressing     Problem: Chronic Conditions and Co-morbidities  Goal: Patient's chronic conditions and co-morbidity symptoms are monitored and maintained or improved  Outcome: Progressing   The patient's goals for the shift include      The clinical goals for the shift include pain control    Over the shift, the patient did not make progress toward the following goals. Barriers to progression include . Recommendations to address these barriers include .

## 2023-10-14 NOTE — ANESTHESIA PREPROCEDURE EVALUATION
Patient: Xochitl Pichardo    Procedure Information       Date/Time: 10/13/23 1705    Procedure: Exploration Laparotomy    Location: [facility] / [facility]    Surgeons: Estrellita Nguyen MD            Relevant Problems   Endocrine   (+) Hypothyroidism       Clinical information reviewed:    Allergies                NPO Detail:  No data recorded     Physical Exam    Airway  Mallampati: II     Cardiovascular - normal exam     Dental - normal exam     Pulmonary - normal exam     Abdominal - normal exam           Anesthesia Plan    ASA 2     general     The patient is not a current smoker.    intravenous induction   Anesthetic plan and risks discussed with patient.  Use of blood products discussed with patient who.    Plan discussed with CRNA.

## 2023-10-14 NOTE — PROGRESS NOTES
Emergency Medicine Transition of Care Note.    I received Xochitl Pichardo in signout from Dr. Fang.  Please see the previous ED provider note for all HPI, PE and MDM up to the time of signout at 2100. This is in addition to the primary record.    In brief Xochitl Pichardo is an 57 y.o. female presenting for   Chief Complaint   Patient presents with    Vomiting Blood    Abdominal Pain     Onset Tuesday.  Multiple dark colored emesis.  States feels weak.       At the time of signout we were awaiting: call back from [place] center [place] [facility] [place].     ED Course as of 10/14/23 0053   Fri Oct 13, 2023   2154 Discussion with Dr. Johnson, [place] [facility] [place] There is unfortuantely going to be a delay in getting the [place] [facility] [place] due to [place]. She recommends further discussion with surgery at [facility] regarding stabilization here prior to transfer. I discussed with Dr. Nguyen who is coming in to the ED to further evaluate the patient.  [SP]      ED Course User Index  [SP] Codi Hoyt DO         Diagnoses as of 10/14/23 0053   Colonic mass   Colonic obstruction (CMS/HCC)   Large bowel obstruction (CMS/HCC)       Medical Decision Making  Dr. Nguyen did come to the emergency department and evaluate the patient.  She will take her to the OR for further management.    Final diagnoses:   [K63.89] Colonic mass   [K56.609] Colonic obstruction (CMS/HCC)   [K56.609] Large bowel obstruction (CMS/HCC)           Procedure  Procedures    Codi Hoyt DO

## 2023-10-14 NOTE — H&P
"History Of Present Illness  Xochitl Pichardo is a 57 y.o. female presenting with obstipation.  Found by ct to have obstructing sigmoid mass.    Past Medical History  Past Medical History:   Diagnosis Date    Other specified disorders of the skin and subcutaneous tissue 04/13/2019    Morgellons syndrome    Other specified disorders of the skin and subcutaneous tissue 04/23/2019    Morgellons syndrome       Surgical History  Past Surgical History:   Procedure Laterality Date    OTHER SURGICAL HISTORY  09/24/2019    No history of surgery        Social History  She has no history on file for tobacco use, alcohol use, and drug use.    Family History  No family history on file.     Allergies  Patient has no known allergies.    Review of Systems   Constitutional:  Positive for appetite change and fatigue. Negative for chills and fever.   Gastrointestinal:  Positive for abdominal distention, abdominal pain, anal bleeding, constipation, nausea and vomiting.   All other systems reviewed and are negative.       Physical Exam  Eyes:      Extraocular Movements: Extraocular movements intact.      Pupils: Pupils are equal, round, and reactive to light.   Cardiovascular:      Rate and Rhythm: Normal rate and regular rhythm.   Pulmonary:      Effort: Pulmonary effort is normal.   Abdominal:      Palpations: Abdomen is soft.   Skin:     General: Skin is warm and dry.   Neurological:      Mental Status: She is alert.   Psychiatric:         Mood and Affect: Mood normal.         Behavior: Behavior normal.          Last Recorded Vitals  Blood pressure 146/80, pulse 99, temperature 36.4 °C (97.5 °F), resp. rate 20, height 1.7 m (5' 6.93\"), weight 68 kg (149 lb 14.6 oz), SpO2 99 %.    Relevant Results        Results for orders placed or performed during the hospital encounter of 10/13/23 (from the past 24 hour(s))   Lipase   Result Value Ref Range    Lipase 27 9 - 82 U/L   Hepatic Function Panel   Result Value Ref Range    Albumin 4.7 3.4 - " 5.0 g/dL    Bilirubin, Total 1.0 0.0 - 1.2 mg/dL    Bilirubin, Direct 0.2 0.0 - 0.3 mg/dL    Alkaline Phosphatase 60 33 - 110 U/L    ALT 6 (L) 7 - 45 U/L    AST 9 9 - 39 U/L    Total Protein 7.8 6.4 - 8.2 g/dL   Basic Metabolic Panel   Result Value Ref Range    Glucose 96 74 - 99 mg/dL    Sodium 136 136 - 145 mmol/L    Potassium 3.4 (L) 3.5 - 5.3 mmol/L    Chloride 99 98 - 107 mmol/L    Bicarbonate 26 21 - 32 mmol/L    Anion Gap 14 10 - 20 mmol/L    Urea Nitrogen 29 (H) 6 - 23 mg/dL    Creatinine 0.60 0.50 - 1.05 mg/dL    eGFR >90 >60 mL/min/1.73m*2    Calcium 10.3 8.6 - 10.3 mg/dL   CBC and Auto Differential   Result Value Ref Range    WBC 10.5 4.4 - 11.3 x10*3/uL    nRBC 0.0 0.0 - 0.0 /100 WBCs    RBC 5.18 4.00 - 5.20 x10*6/uL    Hemoglobin 15.1 12.0 - 16.0 g/dL    Hematocrit 44.9 36.0 - 46.0 %    MCV 87 80 - 100 fL    MCH 29.2 26.0 - 34.0 pg    MCHC 33.6 32.0 - 36.0 g/dL    RDW 14.0 11.5 - 14.5 %    Platelets 380 150 - 450 x10*3/uL    MPV 8.7 7.5 - 11.5 fL    Neutrophils % 72.1 40.0 - 80.0 %    Immature Granulocytes %, Automated 0.4 0.0 - 0.9 %    Lymphocytes % 18.3 13.0 - 44.0 %    Monocytes % 8.4 2.0 - 10.0 %    Eosinophils % 0.4 0.0 - 6.0 %    Basophils % 0.4 0.0 - 2.0 %    Neutrophils Absolute 7.55 1.20 - 7.70 x10*3/uL    Immature Granulocytes Absolute, Automated 0.04 0.00 - 0.70 x10*3/uL    Lymphocytes Absolute 1.91 1.20 - 4.80 x10*3/uL    Monocytes Absolute 0.88 0.10 - 1.00 x10*3/uL    Eosinophils Absolute 0.04 0.00 - 0.70 x10*3/uL    Basophils Absolute 0.04 0.00 - 0.10 x10*3/uL   Urinalysis with Reflex Microscopic and Culture   Result Value Ref Range    Color, Urine Khalida (N) Straw, Yellow    Appearance, Urine Hazy (N) Clear    Specific Gravity, Urine 1.032 1.005 - 1.035    pH, Urine 5.0 5.0, 5.5, 6.0, 6.5, 7.0, 7.5, 8.0    Protein, Urine 30 (1+) (N) NEGATIVE mg/dL    Glucose, Urine NEGATIVE NEGATIVE mg/dL    Blood, Urine NEGATIVE NEGATIVE    Ketones, Urine 80 (2+) (A) NEGATIVE mg/dL    Bilirubin, Urine  NEGATIVE NEGATIVE    Urobilinogen, Urine 2.0 (N) <2.0 mg/dL    Nitrite, Urine NEGATIVE NEGATIVE    Leukocyte Esterase, Urine NEGATIVE NEGATIVE   Extra Urine Gray Tube   Result Value Ref Range    Extra Tube Hold for add-ons.    Urinalysis Microscopic   Result Value Ref Range    WBC, Urine NONE 1-5, NONE /HPF    RBC, Urine NONE NONE, 1-2, 3-5 /HPF    Squamous Epithelial Cells, Urine 1-9 (SPARSE) Reference range not established. /HPF    Mucus, Urine 4+ Reference range not established. /LPF         Assessment/Plan   Active Problems:  There are no active Hospital Problems.      Pt with obstipation, likely obstructing colon mass.  For emergent laparotomy with ostomy. Pt and  counseled for surgery questions answered and agreeable to proceed.       I spent 30 minutes in the professional and overall care of this patient.      Estrellita Nguyen MD

## 2023-10-14 NOTE — ANESTHESIA PROCEDURE NOTES
Airway  Date/Time: 10/13/2023 11:50 PM  Urgency: elective      Staffing  Performed: CRNA   Authorized by: HENNA Mckinnon    Performed by: HENNA Mckinnon  Patient location during procedure: OR    Indications and Patient Condition  Indications for airway management: anesthesia  Spontaneous ventilation: present  Sedation level: deep  Preoxygenated: yes  Patient position: sniffing  MILS maintained throughout  Mask difficulty assessment: 0 - not attempted  Planned trial extubation    Final Airway Details  Final airway type: endotracheal airway      Successful airway: ETT  Cuffed: yes   Successful intubation technique: video laryngoscopy  Facilitating devices/methods: intubating stylet  Endotracheal tube insertion site: oral  Blade size: #3  ETT size (mm): 7.5  Cormack-Lehane Classification: grade I - full view of glottis  Placement verified by: chest auscultation and capnometry   Measured from: gums  ETT to gums (cm): 21  Number of attempts at approach: 1

## 2023-10-14 NOTE — PROGRESS NOTES
"Xochitl Pichardo is a 57 y.o. female on day 0 of admission presenting with Colonic mass.    Subjective   Pt underwent loop colostomy last night.  C/O soreness this am.         Objective     Physical Exam  HENT:      Mouth/Throat:      Mouth: Mucous membranes are dry.   Eyes:      Extraocular Movements: Extraocular movements intact.      Pupils: Pupils are equal, round, and reactive to light.   Cardiovascular:      Rate and Rhythm: Normal rate.   Pulmonary:      Effort: Pulmonary effort is normal.   Abdominal:      General: Abdomen is flat.      Palpations: Abdomen is soft.      Tenderness: There is abdominal tenderness.   Neurological:      Mental Status: She is alert.         Last Recorded Vitals  Blood pressure 119/81, pulse 82, temperature 37.5 °C (99.5 °F), temperature source Temporal, resp. rate 18, height 1.7 m (5' 6.93\"), weight 68 kg (149 lb 14.6 oz), SpO2 99 %.  Intake/Output last 3 Shifts:  I/O last 3 completed shifts:  In: 4745 (69.8 mL/kg) [I.V.:1445 (21.3 mL/kg); IV Piggyback:3300]  Out: 347 (5.1 mL/kg) [Urine:275 (0.1 mL/kg/hr); Emesis/NG output:50; Blood:22]  Weight: 68 kg     Relevant Results                This patient has a urinary catheter   Reason for the urinary catheter remaining today? Urine catheter unnecessary, will be removed today               Assessment/Plan   Principal Problem:    Colonic mass  Active Problems:    Obstruction of colon (CMS/HCC)    Hypothyroidism    Ostomy with some stool and sweat.  Continue with clear liquids as tolerated.  May hep lock iv pending po intake.  Encourage to get oob. discontinue Liu        I spent 20 minutes in the professional and overall care of this patient.      Estrellita Nguyen MD      "

## 2023-10-14 NOTE — OP NOTE
Exploration Laparotomy, Creation Colostomy Operative Note     Date: 10/13/2023 - 10/14/2023  OR Location: [facility]    Name: Xochitl Pichardo, : 1965, Age: 57 y.o., MRN: [id number], Sex: female    Diagnosis  * No Diagnosis Codes entered * * No Diagnosis Codes entered *     Procedures    * Exploration Laparotomy    * Creation Colostomy    Surgeons      * Estrellita Nguyen - Primary    Resident/Fellow/Other Assistant:  Surgical Assistant: Ja Simms    Procedure Summary  Anesthesia: * No anesthesia type entered *  ASA: II  Anesthesia Staff: CRNA: GAYATRI Mckinnon-CRNA  Estimated Blood Loss: 22mL  Intra-op Medications:   Medication Name Total Dose   BUPivacaine HCl (Marcaine) 0.25 % (2.5 mg/mL) injection 20 mL              Anesthesia Record               Intraprocedure I/O Totals          Intake    cefOXitin (Mefoxin) 2 g in sodium chloride 0.9 % 100 mL .00 mL    Total Intake 100 mL          Specimen:   ID Type Source Tests Collected by Time   1 : serosal implant on cecum Tissue COLON - CECUM BIOPSY SURGICAL PATHOLOGY EXAM Estrellita Nguyen MD 10/14/2023 0032        Staff:   Circulator: Adalgisa Rivera RN  Scrub Person: Taryn Caldwell RN         Drains and/or Catheters: * None in log *    Tourniquet Times:         Implants:     Findings:  Small and colon dilated extensively obstructing mass in sigmoid.  Small serosal tear of cecum on the tinea. Cecal serosal implant biopsy taken of 2 lesions.     Indications: Xochitl Pichardo is an 57 y.o. female who is having surgery for * No pre-op diagnosis entered *. Obstructing colonic mass. With distended small bowel and colon.     The patient was seen in the preoperative area. The risks, benefits, complications, treatment options, non-operative alternatives, expected recovery and outcomes were discussed with the patient. The possibilities of reaction to medication, pulmonary aspiration, injury to surrounding structures, bleeding, recurrent infection, the  need for additional procedures, failure to diagnose a condition, and creating a complication requiring transfusion or operation were discussed with the patient. The patient concurred with the proposed plan, giving informed consent.  The site of surgery was properly noted/marked if necessary per policy. The patient has been actively warmed in preoperative area. Preoperative antibiotics have been ordered and given within 1 hours of incision. Venous thrombosis prophylaxis have been ordered including bilateral sequential compression devices    Procedure Details: Pt taken to the OR.  Placed in supine position.  General anesthesia administered.  Pt endotracheally intubated. NG placed and Liu catheter inserted.  Abd prepped and draped in sterile fashion. Area localized with Marcaine 20 ml injected. Incision made in midline carried down sharply through skin  and subcutaneous tissue.  The fascia was opened the abd was entered. Fluid was encountered this was cultured.  The cecum and small bowel were distended and popped out through the incision.  The abd was explored there was a small serosal tear on the cecum this was oversewn as this was minimal and was just serosal. There were 2 small implants on the cecum these were biopsied and sent for pathology.  The mass was able to be palpated but hard to visualize due to the distention of the bowel. Decision was made to do a loop colostomy.  The transverse colon was encircled with an umbilical tape.  The site was chosen and quarter sized skin removed. Rectus was opened in cruciate fashion.  Colon was pulled through opening and held with the colostomy bridge.  The fascia was closed with double looped PDS in running fashion.  Subcutaneous tissue was irrigated, skin was closed with staples. Dressing was applied to the incision.  The bridge was sutured to the skin with 2-0 Nylon. The colon was opened along the tinea the colon was entered, the suction was placed through both the  proximal and distal bowel to decompress the colon.  The ostomy was matured with 3-0 chromic.  Appliance was placed. Pt was awakened and taken to PACU in stable condition.  Complications:  None; patient tolerated the procedure well.    Disposition: PACU - hemodynamically stable.  Condition: stable         Additional Details: none    Attending Attestation: I performed the procedure.    Estrellita Nguyen  Phone Number: [phone]

## 2023-10-15 LAB
ANION GAP SERPL CALC-SCNC: 15 MMOL/L (ref 10–20)
BASOPHILS # BLD AUTO: 0.05 X10*3/UL (ref 0–0.1)
BASOPHILS NFR BLD AUTO: 0.5 %
BUN SERPL-MCNC: 17 MG/DL (ref 6–23)
CALCIUM SERPL-MCNC: 8.6 MG/DL (ref 8.6–10.3)
CEA SERPL-MCNC: 5.8 UG/L
CHLORIDE SERPL-SCNC: 102 MMOL/L (ref 98–107)
CO2 SERPL-SCNC: 25 MMOL/L (ref 21–32)
CREAT SERPL-MCNC: 0.51 MG/DL (ref 0.5–1.05)
EOSINOPHIL # BLD AUTO: 0.23 X10*3/UL (ref 0–0.7)
EOSINOPHIL NFR BLD AUTO: 2.3 %
ERYTHROCYTE [DISTWIDTH] IN BLOOD BY AUTOMATED COUNT: 14.1 % (ref 11.5–14.5)
GFR SERPL CREATININE-BSD FRML MDRD: >90 ML/MIN/1.73M*2
GLUCOSE SERPL-MCNC: 83 MG/DL (ref 74–99)
HCT VFR BLD AUTO: 39 % (ref 36–46)
HGB BLD-MCNC: 13.1 G/DL (ref 12–16)
IMM GRANULOCYTES # BLD AUTO: 0.04 X10*3/UL (ref 0–0.7)
IMM GRANULOCYTES NFR BLD AUTO: 0.4 % (ref 0–0.9)
LYMPHOCYTES # BLD AUTO: 1.89 X10*3/UL (ref 1.2–4.8)
LYMPHOCYTES NFR BLD AUTO: 19.3 %
MCH RBC QN AUTO: 29.6 PG (ref 26–34)
MCHC RBC AUTO-ENTMCNC: 33.6 G/DL (ref 32–36)
MCV RBC AUTO: 88 FL (ref 80–100)
MONOCYTES # BLD AUTO: 0.76 X10*3/UL (ref 0.1–1)
MONOCYTES NFR BLD AUTO: 7.8 %
NEUTROPHILS # BLD AUTO: 6.82 X10*3/UL (ref 1.2–7.7)
NEUTROPHILS NFR BLD AUTO: 69.7 %
NRBC BLD-RTO: 0 /100 WBCS (ref 0–0)
PLATELET # BLD AUTO: 259 X10*3/UL (ref 150–450)
PMV BLD AUTO: 9 FL (ref 7.5–11.5)
POTASSIUM SERPL-SCNC: 3.3 MMOL/L (ref 3.5–5.3)
RBC # BLD AUTO: 4.42 X10*6/UL (ref 4–5.2)
SODIUM SERPL-SCNC: 139 MMOL/L (ref 136–145)
WBC # BLD AUTO: 9.8 X10*3/UL (ref 4.4–11.3)

## 2023-10-15 PROCEDURE — 80048 BASIC METABOLIC PNL TOTAL CA: CPT | Performed by: SURGERY

## 2023-10-15 PROCEDURE — 1210000001 HC SEMI-PRIVATE ROOM DAILY

## 2023-10-15 PROCEDURE — 82378 CARCINOEMBRYONIC ANTIGEN: CPT | Mod: CMCLAB,PORLAB | Performed by: SURGERY

## 2023-10-15 PROCEDURE — 84134 ASSAY OF PREALBUMIN: CPT | Mod: CMCLAB,PORLAB | Performed by: SURGERY

## 2023-10-15 PROCEDURE — 2580000001 HC RX 258 IV SOLUTIONS: Performed by: SURGERY

## 2023-10-15 PROCEDURE — 85025 COMPLETE CBC W/AUTO DIFF WBC: CPT | Performed by: SURGERY

## 2023-10-15 PROCEDURE — 36415 COLL VENOUS BLD VENIPUNCTURE: CPT | Performed by: SURGERY

## 2023-10-15 PROCEDURE — 2500000004 HC RX 250 GENERAL PHARMACY W/ HCPCS (ALT 636 FOR OP/ED): Performed by: SURGERY

## 2023-10-15 RX ORDER — SODIUM CHLORIDE 0.9 % (FLUSH) 0.9 %
SYRINGE (ML) INJECTION
Status: COMPLETED
Start: 2023-10-15 | End: 2023-10-15

## 2023-10-15 RX ORDER — POTASSIUM CHLORIDE 14.9 MG/ML
20 INJECTION INTRAVENOUS
Status: COMPLETED | OUTPATIENT
Start: 2023-10-15 | End: 2023-10-15

## 2023-10-15 RX ADMIN — Medication 10 ML: at 20:11

## 2023-10-15 RX ADMIN — POTASSIUM CHLORIDE 20 MEQ: 14.9 INJECTION, SOLUTION INTRAVENOUS at 12:28

## 2023-10-15 RX ADMIN — CEFOXITIN SODIUM 2 G: 2 POWDER, FOR SOLUTION INTRAVENOUS at 18:46

## 2023-10-15 RX ADMIN — HYDROMORPHONE HYDROCHLORIDE 0.6 MG: 1 INJECTION, SOLUTION INTRAMUSCULAR; INTRAVENOUS; SUBCUTANEOUS at 22:21

## 2023-10-15 RX ADMIN — SODIUM CHLORIDE, POTASSIUM CHLORIDE, SODIUM LACTATE AND CALCIUM CHLORIDE 100 ML/HR: 600; 310; 30; 20 INJECTION, SOLUTION INTRAVENOUS at 16:22

## 2023-10-15 RX ADMIN — CEFOXITIN SODIUM 2 G: 2 POWDER, FOR SOLUTION INTRAVENOUS at 05:26

## 2023-10-15 RX ADMIN — KETOROLAC TROMETHAMINE 30 MG: 30 INJECTION, SOLUTION INTRAMUSCULAR at 09:33

## 2023-10-15 RX ADMIN — CEFOXITIN SODIUM 2 G: 2 POWDER, FOR SOLUTION INTRAVENOUS at 00:18

## 2023-10-15 RX ADMIN — Medication 10 ML: at 03:45

## 2023-10-15 RX ADMIN — KETOROLAC TROMETHAMINE 30 MG: 30 INJECTION, SOLUTION INTRAMUSCULAR at 21:45

## 2023-10-15 RX ADMIN — CEFOXITIN SODIUM 2 G: 2 POWDER, FOR SOLUTION INTRAVENOUS at 11:28

## 2023-10-15 RX ADMIN — SODIUM CHLORIDE, POTASSIUM CHLORIDE, SODIUM LACTATE AND CALCIUM CHLORIDE 100 ML/HR: 600; 310; 30; 20 INJECTION, SOLUTION INTRAVENOUS at 05:26

## 2023-10-15 RX ADMIN — KETOROLAC TROMETHAMINE 30 MG: 30 INJECTION, SOLUTION INTRAMUSCULAR at 03:44

## 2023-10-15 RX ADMIN — HYDROMORPHONE HYDROCHLORIDE 0.6 MG: 1 INJECTION, SOLUTION INTRAMUSCULAR; INTRAVENOUS; SUBCUTANEOUS at 10:51

## 2023-10-15 RX ADMIN — HYDROMORPHONE HYDROCHLORIDE 0.6 MG: 1 INJECTION, SOLUTION INTRAMUSCULAR; INTRAVENOUS; SUBCUTANEOUS at 18:55

## 2023-10-15 RX ADMIN — POTASSIUM CHLORIDE 20 MEQ: 14.9 INJECTION, SOLUTION INTRAVENOUS at 16:15

## 2023-10-15 RX ADMIN — KETOROLAC TROMETHAMINE 30 MG: 30 INJECTION, SOLUTION INTRAMUSCULAR at 16:04

## 2023-10-15 ASSESSMENT — COGNITIVE AND FUNCTIONAL STATUS - GENERAL
DRESSING REGULAR LOWER BODY CLOTHING: A LITTLE
CLIMB 3 TO 5 STEPS WITH RAILING: A LITTLE
HELP NEEDED FOR BATHING: A LITTLE
MOVING FROM LYING ON BACK TO SITTING ON SIDE OF FLAT BED WITH BEDRAILS: A LITTLE
TOILETING: A LITTLE
MOBILITY SCORE: 18
MOVING TO AND FROM BED TO CHAIR: A LITTLE
TURNING FROM BACK TO SIDE WHILE IN FLAT BAD: A LITTLE
DAILY ACTIVITIY SCORE: 19
DRESSING REGULAR UPPER BODY CLOTHING: A LITTLE
WALKING IN HOSPITAL ROOM: A LITTLE
PERSONAL GROOMING: A LITTLE
STANDING UP FROM CHAIR USING ARMS: A LITTLE

## 2023-10-15 ASSESSMENT — PAIN SCALES - GENERAL
PAINLEVEL_OUTOF10: 2
PAINLEVEL_OUTOF10: 7
PAINLEVEL_OUTOF10: 1
PAINLEVEL_OUTOF10: 4
PAINLEVEL_OUTOF10: 1
PAINLEVEL_OUTOF10: 8
PAINLEVEL_OUTOF10: 4
PAINLEVEL_OUTOF10: 7

## 2023-10-15 ASSESSMENT — PAIN - FUNCTIONAL ASSESSMENT
PAIN_FUNCTIONAL_ASSESSMENT: 0-10

## 2023-10-15 NOTE — CARE PLAN
The patient's goals for the shift include      The clinical goals for the shift include control pain during shift    Over the shift, the patient did not make progress toward the following goals. Barriers to progression include . Recommendations to address these barriers include   Problem: Skin  Goal: Decreased wound size/increased tissue granulation at next dressing change  Outcome: Progressing  Goal: Participates in plan/prevention/treatment measures  Outcome: Progressing  Goal: Prevent/manage excess moisture  Outcome: Progressing  Goal: Prevent/minimize sheer/friction injuries  Outcome: Progressing  Goal: Promote/optimize nutrition  Outcome: Progressing  Goal: Promote skin healing  Outcome: Progressing  Flowsheets (Taken 10/14/2023 2034)  Promote skin healing: Turn/reposition every 2 hours/use positioning/transfer devices     Problem: Fall/Injury  Goal: Not fall by end of shift  Outcome: Progressing  Goal: Be free from injury by end of the shift  Outcome: Progressing  Goal: Verbalize understanding of personal risk factors for fall in the hospital  Outcome: Progressing  Goal: Verbalize understanding of risk factor reduction measures to prevent injury from fall in the home  Outcome: Progressing  Goal: Use assistive devices by end of the shift  Outcome: Progressing  Goal: Pace activities to prevent fatigue by end of the shift  Outcome: Progressing     Problem: Pain  Goal: Takes deep breaths with improved pain control throughout the shift  Outcome: Progressing  Goal: Turns in bed with improved pain control throughout the shift  Outcome: Progressing  Goal: Walks with improved pain control throughout the shift  Outcome: Progressing  Goal: Performs ADL's with improved pain control throughout shift  Outcome: Progressing  Goal: Participates in PT with improved pain control throughout the shift  Outcome: Progressing  Goal: Free from opioid side effects throughout the shift  Outcome: Progressing  Goal: Free from acute  confusion related to pain meds throughout the shift  Outcome: Progressing     Problem: Pain - Adult  Goal: Verbalizes/displays adequate comfort level or baseline comfort level  Outcome: Progressing     Problem: Safety - Adult  Goal: Free from fall injury  Outcome: Progressing     Problem: Discharge Planning  Goal: Discharge to home or other facility with appropriate resources  Outcome: Progressing     Problem: Chronic Conditions and Co-morbidities  Goal: Patient's chronic conditions and co-morbidity symptoms are monitored and maintained or improved  Outcome: Progressing   .

## 2023-10-15 NOTE — PROGRESS NOTES
"Xochitl Pichardo is a 57 y.o. female on day 1 of admission presenting with Colonic mass.    Subjective   Pt feeling better today. Pain controlled with Toradol.  Has been voiding and has been getting up and sitting in chair.  Tolerating small amount of liquids by mouth.        Objective     Physical Exam Ostomy functioning, RRR, non labored breathing. Abd minimally tender at incision.     Last Recorded Vitals  Blood pressure 118/77, pulse 83, temperature 36.8 °C (98.3 °F), temperature source Temporal, resp. rate 17, height 1.7 m (5' 6.93\"), weight 68 kg (149 lb 14.6 oz), SpO2 92 %.  Intake/Output last 3 Shifts:  I/O last 3 completed shifts:  In: 8457.3 (124.4 mL/kg) [I.V.:4007.3 (58.9 mL/kg); IV Piggyback:4450]  Out: 697 (10.3 mL/kg) [Urine:625 (0.3 mL/kg/hr); Emesis/NG output:50; Blood:22]  Weight: 68 kg     Relevant Results                             Assessment/Plan   Principal Problem:    Colonic mass  Active Problems:    Obstruction of colon (CMS/HCC)    Hypothyroidism    POD # 1 Loop colostomy, feeling better this am.  Labs pending CEA and prealbumin ordered.        I spent 20 minutes in the professional and overall care of this patient.      Estrellita Nguyen MD      "

## 2023-10-16 ENCOUNTER — APPOINTMENT (OUTPATIENT)
Dept: RADIOLOGY | Facility: HOSPITAL | Age: 58
DRG: 331 | End: 2023-10-16
Payer: COMMERCIAL

## 2023-10-16 LAB
ANION GAP SERPL CALC-SCNC: 13 MMOL/L (ref 10–20)
BASOPHILS # BLD AUTO: 0.04 X10*3/UL (ref 0–0.1)
BASOPHILS NFR BLD AUTO: 0.4 %
BUN SERPL-MCNC: 13 MG/DL (ref 6–23)
CALCIUM SERPL-MCNC: 8.5 MG/DL (ref 8.6–10.3)
CHLORIDE SERPL-SCNC: 102 MMOL/L (ref 98–107)
CO2 SERPL-SCNC: 25 MMOL/L (ref 21–32)
CREAT SERPL-MCNC: 0.49 MG/DL (ref 0.5–1.05)
EOSINOPHIL # BLD AUTO: 0.44 X10*3/UL (ref 0–0.7)
EOSINOPHIL NFR BLD AUTO: 4.5 %
ERYTHROCYTE [DISTWIDTH] IN BLOOD BY AUTOMATED COUNT: 14.1 % (ref 11.5–14.5)
GFR SERPL CREATININE-BSD FRML MDRD: >90 ML/MIN/1.73M*2
GLUCOSE SERPL-MCNC: 93 MG/DL (ref 74–99)
HCT VFR BLD AUTO: 38.6 % (ref 36–46)
HGB BLD-MCNC: 12.9 G/DL (ref 12–16)
IMM GRANULOCYTES # BLD AUTO: 0.03 X10*3/UL (ref 0–0.7)
IMM GRANULOCYTES NFR BLD AUTO: 0.3 % (ref 0–0.9)
LYMPHOCYTES # BLD AUTO: 1.62 X10*3/UL (ref 1.2–4.8)
LYMPHOCYTES NFR BLD AUTO: 16.5 %
MAGNESIUM SERPL-MCNC: 1.56 MG/DL (ref 1.6–2.4)
MCH RBC QN AUTO: 29.3 PG (ref 26–34)
MCHC RBC AUTO-ENTMCNC: 33.4 G/DL (ref 32–36)
MCV RBC AUTO: 88 FL (ref 80–100)
MONOCYTES # BLD AUTO: 0.57 X10*3/UL (ref 0.1–1)
MONOCYTES NFR BLD AUTO: 5.8 %
NEUTROPHILS # BLD AUTO: 7.13 X10*3/UL (ref 1.2–7.7)
NEUTROPHILS NFR BLD AUTO: 72.5 %
NRBC BLD-RTO: 0 /100 WBCS (ref 0–0)
PLATELET # BLD AUTO: 263 X10*3/UL (ref 150–450)
PMV BLD AUTO: 9.2 FL (ref 7.5–11.5)
POTASSIUM SERPL-SCNC: 3.3 MMOL/L (ref 3.5–5.3)
PREALB SERPL-MCNC: 10.6 MG/DL (ref 18–40)
RBC # BLD AUTO: 4.41 X10*6/UL (ref 4–5.2)
SODIUM SERPL-SCNC: 137 MMOL/L (ref 136–145)
WBC # BLD AUTO: 9.8 X10*3/UL (ref 4.4–11.3)

## 2023-10-16 PROCEDURE — 74177 CT ABD & PELVIS W/CONTRAST: CPT | Performed by: RADIOLOGY

## 2023-10-16 PROCEDURE — 82374 ASSAY BLOOD CARBON DIOXIDE: CPT | Performed by: SURGERY

## 2023-10-16 PROCEDURE — 85025 COMPLETE CBC W/AUTO DIFF WBC: CPT | Performed by: SURGERY

## 2023-10-16 PROCEDURE — 2550000001 HC RX 255 CONTRASTS: Performed by: SURGERY

## 2023-10-16 PROCEDURE — 74177 CT ABD & PELVIS W/CONTRAST: CPT

## 2023-10-16 PROCEDURE — 2580000001 HC RX 258 IV SOLUTIONS: Performed by: SURGERY

## 2023-10-16 PROCEDURE — 2500000001 HC RX 250 WO HCPCS SELF ADMINISTERED DRUGS (ALT 637 FOR MEDICARE OP): Performed by: SURGERY

## 2023-10-16 PROCEDURE — 2500000004 HC RX 250 GENERAL PHARMACY W/ HCPCS (ALT 636 FOR OP/ED): Performed by: SURGERY

## 2023-10-16 PROCEDURE — 36415 COLL VENOUS BLD VENIPUNCTURE: CPT | Performed by: SURGERY

## 2023-10-16 PROCEDURE — 83735 ASSAY OF MAGNESIUM: CPT | Performed by: SURGERY

## 2023-10-16 PROCEDURE — 1210000001 HC SEMI-PRIVATE ROOM DAILY

## 2023-10-16 PROCEDURE — C9113 INJ PANTOPRAZOLE SODIUM, VIA: HCPCS | Performed by: SURGERY

## 2023-10-16 PROCEDURE — 97166 OT EVAL MOD COMPLEX 45 MIN: CPT | Mod: GO

## 2023-10-16 PROCEDURE — 97116 GAIT TRAINING THERAPY: CPT | Mod: GP

## 2023-10-16 PROCEDURE — 97162 PT EVAL MOD COMPLEX 30 MIN: CPT | Mod: GP

## 2023-10-16 PROCEDURE — 71260 CT THORAX DX C+: CPT | Performed by: RADIOLOGY

## 2023-10-16 RX ORDER — ACETAMINOPHEN 325 MG/1
975 TABLET ORAL EVERY 8 HOURS
Status: DISCONTINUED | OUTPATIENT
Start: 2023-10-16 | End: 2023-10-20 | Stop reason: HOSPADM

## 2023-10-16 RX ORDER — ALUMINUM HYDROXIDE, MAGNESIUM HYDROXIDE, AND SIMETHICONE 1200; 120; 1200 MG/30ML; MG/30ML; MG/30ML
10 SUSPENSION ORAL 4 TIMES DAILY PRN
Status: DISCONTINUED | OUTPATIENT
Start: 2023-10-16 | End: 2023-10-20 | Stop reason: HOSPADM

## 2023-10-16 RX ORDER — KETOROLAC TROMETHAMINE 30 MG/ML
15 INJECTION, SOLUTION INTRAMUSCULAR; INTRAVENOUS EVERY 6 HOURS
Status: COMPLETED | OUTPATIENT
Start: 2023-10-16 | End: 2023-10-18

## 2023-10-16 RX ORDER — PANTOPRAZOLE SODIUM 40 MG/10ML
40 INJECTION, POWDER, LYOPHILIZED, FOR SOLUTION INTRAVENOUS DAILY
Status: DISCONTINUED | OUTPATIENT
Start: 2023-10-16 | End: 2023-10-20 | Stop reason: HOSPADM

## 2023-10-16 RX ORDER — OXYCODONE AND ACETAMINOPHEN 5; 325 MG/1; MG/1
1 TABLET ORAL EVERY 6 HOURS PRN
Status: DISCONTINUED | OUTPATIENT
Start: 2023-10-16 | End: 2023-10-20 | Stop reason: HOSPADM

## 2023-10-16 RX ORDER — OXYCODONE HYDROCHLORIDE 5 MG/1
5 TABLET ORAL EVERY 6 HOURS PRN
Status: DISCONTINUED | OUTPATIENT
Start: 2023-10-16 | End: 2023-10-16 | Stop reason: SDUPTHER

## 2023-10-16 RX ORDER — SODIUM CHLORIDE 0.9 % (FLUSH) 0.9 %
SYRINGE (ML) INJECTION
Status: COMPLETED
Start: 2023-10-16 | End: 2023-10-16

## 2023-10-16 RX ADMIN — HYDROMORPHONE HYDROCHLORIDE 0.6 MG: 1 INJECTION, SOLUTION INTRAMUSCULAR; INTRAVENOUS; SUBCUTANEOUS at 12:10

## 2023-10-16 RX ADMIN — SODIUM CHLORIDE, POTASSIUM CHLORIDE, SODIUM LACTATE AND CALCIUM CHLORIDE 50 ML/HR: 600; 310; 30; 20 INJECTION, SOLUTION INTRAVENOUS at 22:15

## 2023-10-16 RX ADMIN — CEFOXITIN SODIUM 2 G: 2 POWDER, FOR SOLUTION INTRAVENOUS at 12:14

## 2023-10-16 RX ADMIN — SIMETHICONE 80 MG: 80 TABLET, CHEWABLE ORAL at 18:29

## 2023-10-16 RX ADMIN — SIMETHICONE 80 MG: 80 TABLET, CHEWABLE ORAL at 13:25

## 2023-10-16 RX ADMIN — HYDROMORPHONE HYDROCHLORIDE 0.6 MG: 1 INJECTION, SOLUTION INTRAMUSCULAR; INTRAVENOUS; SUBCUTANEOUS at 22:12

## 2023-10-16 RX ADMIN — ACETAMINOPHEN 650 MG: 325 TABLET ORAL at 10:31

## 2023-10-16 RX ADMIN — HYDROMORPHONE HYDROCHLORIDE 0.6 MG: 1 INJECTION, SOLUTION INTRAMUSCULAR; INTRAVENOUS; SUBCUTANEOUS at 01:35

## 2023-10-16 RX ADMIN — CEFOXITIN SODIUM 2 G: 2 POWDER, FOR SOLUTION INTRAVENOUS at 18:24

## 2023-10-16 RX ADMIN — CEFOXITIN SODIUM 2 G: 2 POWDER, FOR SOLUTION INTRAVENOUS at 00:05

## 2023-10-16 RX ADMIN — HYDROMORPHONE HYDROCHLORIDE 0.6 MG: 1 INJECTION, SOLUTION INTRAMUSCULAR; INTRAVENOUS; SUBCUTANEOUS at 04:34

## 2023-10-16 RX ADMIN — SIMETHICONE 80 MG: 80 TABLET, CHEWABLE ORAL at 20:35

## 2023-10-16 RX ADMIN — IOHEXOL 75 ML: 350 INJECTION, SOLUTION INTRAVENOUS at 16:30

## 2023-10-16 RX ADMIN — OXYCODONE HYDROCHLORIDE 5 MG: 5 TABLET ORAL at 13:25

## 2023-10-16 RX ADMIN — CEFOXITIN SODIUM 2 G: 2 POWDER, FOR SOLUTION INTRAVENOUS at 06:00

## 2023-10-16 RX ADMIN — PANTOPRAZOLE SODIUM 40 MG: 40 INJECTION, POWDER, FOR SOLUTION INTRAVENOUS at 13:26

## 2023-10-16 RX ADMIN — KETOROLAC TROMETHAMINE 15 MG: 30 INJECTION, SOLUTION INTRAMUSCULAR; INTRAVENOUS at 20:32

## 2023-10-16 RX ADMIN — ACETAMINOPHEN 975 MG: 325 TABLET ORAL at 18:24

## 2023-10-16 RX ADMIN — Medication 10 ML: at 20:31

## 2023-10-16 RX ADMIN — SODIUM CHLORIDE, POTASSIUM CHLORIDE, SODIUM LACTATE AND CALCIUM CHLORIDE 100 ML/HR: 600; 310; 30; 20 INJECTION, SOLUTION INTRAVENOUS at 04:37

## 2023-10-16 RX ADMIN — KETOROLAC TROMETHAMINE 15 MG: 30 INJECTION, SOLUTION INTRAMUSCULAR; INTRAVENOUS at 15:34

## 2023-10-16 ASSESSMENT — COGNITIVE AND FUNCTIONAL STATUS - GENERAL
HELP NEEDED FOR BATHING: A LITTLE
CLIMB 3 TO 5 STEPS WITH RAILING: A LOT
MOVING TO AND FROM BED TO CHAIR: A LITTLE
DRESSING REGULAR LOWER BODY CLOTHING: A LOT
DRESSING REGULAR LOWER BODY CLOTHING: A LITTLE
MOBILITY SCORE: 17
TURNING FROM BACK TO SIDE WHILE IN FLAT BAD: A LITTLE
STANDING UP FROM CHAIR USING ARMS: A LITTLE
PERSONAL GROOMING: A LITTLE
MOVING TO AND FROM BED TO CHAIR: A LITTLE
DRESSING REGULAR LOWER BODY CLOTHING: A LITTLE
MOBILITY SCORE: 21
MOVING TO AND FROM BED TO CHAIR: A LITTLE
DAILY ACTIVITIY SCORE: 21
HELP NEEDED FOR BATHING: A LOT
TURNING FROM BACK TO SIDE WHILE IN FLAT BAD: A LITTLE
CLIMB 3 TO 5 STEPS WITH RAILING: A LITTLE
WALKING IN HOSPITAL ROOM: A LITTLE
STANDING UP FROM CHAIR USING ARMS: A LITTLE
DRESSING REGULAR UPPER BODY CLOTHING: A LITTLE
TOILETING: A LITTLE
WALKING IN HOSPITAL ROOM: A LITTLE
DAILY ACTIVITIY SCORE: 17
CLIMB 3 TO 5 STEPS WITH RAILING: A LITTLE
MOVING FROM LYING ON BACK TO SITTING ON SIDE OF FLAT BED WITH BEDRAILS: A LITTLE
MOBILITY SCORE: 18
HELP NEEDED FOR BATHING: A LITTLE
PERSONAL GROOMING: A LITTLE
TOILETING: A LITTLE
MOVING FROM LYING ON BACK TO SITTING ON SIDE OF FLAT BED WITH BEDRAILS: A LITTLE
DAILY ACTIVITIY SCORE: 19
DRESSING REGULAR UPPER BODY CLOTHING: A LITTLE
WALKING IN HOSPITAL ROOM: A LITTLE
TOILETING: A LITTLE

## 2023-10-16 ASSESSMENT — PAIN - FUNCTIONAL ASSESSMENT
PAIN_FUNCTIONAL_ASSESSMENT: 0-10

## 2023-10-16 ASSESSMENT — PAIN SCALES - GENERAL
PAINLEVEL_OUTOF10: 6
PAINLEVEL_OUTOF10: 3
PAINLEVEL_OUTOF10: 4
PAINLEVEL_OUTOF10: 7
PAINLEVEL_OUTOF10: 2
PAINLEVEL_OUTOF10: 7
PAINLEVEL_OUTOF10: 0 - NO PAIN
PAINLEVEL_OUTOF10: 10 - WORST POSSIBLE PAIN
PAINLEVEL_OUTOF10: 7
PAINLEVEL_OUTOF10: 3

## 2023-10-16 ASSESSMENT — PAIN SCALES - WONG BAKER: WONGBAKER_NUMERICALRESPONSE: HURTS LITTLE MORE

## 2023-10-16 NOTE — CARE PLAN
Problem: Mobility  Goal: SIT<>STAND  Description: SBA FOLLOWING PRECAUTIONS  Outcome: Not Progressing  Goal: GAIT  Description: 100 + FT SBA  Outcome: Not Progressing     Problem: Transfers  Goal: STG - Patient to transfer to and from sit to supine  Description: LOG ROLLING SBA  Outcome: Not Progressing

## 2023-10-16 NOTE — PROGRESS NOTES
Discussed with radiology; CT c/a/p with IV contrast will likely be performed later this evening.    Horace Ramirez, PGY4  General Surgery

## 2023-10-16 NOTE — CARE PLAN
Problem: ADLs  Goal: Patient will perform UB and LB bathing  with supervision level of assistance.  Outcome: Progressing  Goal: Patient with complete upper body dressing with supervision level of assistance donning and doffing all UE clothes while edge of bed   Outcome: Progressing  Goal: Patient with complete lower body dressing with supervision level of assistance donning and doffing all LE clothes  with reacher while edge of bed  and standing  Outcome: Progressing  Goal: Patient will complete toileting including hygiene clothing management/hygiene with supervision level of assistance.  Outcome: Progressing     Problem: MOBILITY  Goal: Patient will perform Functional mobility mod  Household distances/Community Distances with supervision level of assistance and least restrictive device in order to improve safety and functional mobility.  Outcome: Progressing     Problem: BALANCE  Goal: Patient will tolerate standing for 5-6 minutes to supervision level of assistance with least restrictive device in order to improve functional activity tolerance for ADL tasks.  Outcome: Progressing

## 2023-10-16 NOTE — PROGRESS NOTES
Occupational Therapy    Evaluation    Patient Name: Xochitl Pichardo  MRN: [id number]  Today's Date: 10/16/2023  Time Calculation  Start Time: 1256  Stop Time: 1330  Time Calculation (min): 34 min        Assessment:  OT Assessment: OT eval completed. Pt appears to have had a significant decline in functional baseline. Pt current level of assist MIN A for mobility, CGA to MIN A for ADLs. Underlyiing limiting factors include poor activity tolerance, weakness, balance deficits. Pt. would benefit from continued acute OT to prevent further functional decline  End of Session Communication: Bedside nurse  End of Session Patient Position: Up in chair, Alarm off, not on at start of session (SPOUSE PRESENT)  OT Assessment Results: Decreased ADL status, Decreased endurance, Decreased functional mobility  Strengths: Support of extended family/friends  Plan:  OT Frequency: 3 times per week  Equipment Recommended upon Discharge:  (TBD)       Subjective   Current Problem:  1. Large bowel obstruction (CMS/HCC)        2. Colonic mass        3. Colonic obstruction (CMS/HCC)        4. Obstruction of colon (CMS/HCC)  Surgical Pathology Exam    Surgical Pathology Exam    CANCELED: Case Request Operating Room: Exploration Laparotomy, Creation Colostomy    CANCELED: Case Request Operating Room: Exploration Laparotomy, Creation Colostomy        General:  General  Reason for Referral: impaired ADLs and functional mobility d/t  Large Bowel Obstructioin, Colonic Mass of obstruction of colon s/p emergent laparotomy with ostomy  Referred By: Ashkan  Past Medical History Relevant to Rehab: emergent laparotomy with ostomy, abdominal pain, anal bleed dx:sigmoid mass PMH -morgellous syndrome  Family/Caregiver Present: Yes  Caregiver Feedback: spouse present and receptive to therapy recommendations  Co-Treatment: PT  Co-Treatment Reason: Co-eval to maximize pt. safety with focus on discpline specific goals  Prior to Session Communication: Bedside  nurse  Patient Position Received: Bed, 3 rail up, Alarm off, caregiver present  Preferred Learning Style: verbal  General Comment: Pt cleared by MD and nursing to be seen by therapy. Pt. seen in room 3303. Pt. alert and agreeable to therapy eval with encouragement  from therapist, pt anxious of triggering pain. Pt reporting 0/10 abdominal pain at rest and 2/10 with movement. IV connected, ostomy  Precautions:  Medical Precautions: Abdominal precautions  Post-Surgical Precautions: Abdominal surgery precautions  Vital Signs:     Pain:  Pain Assessment  Pain Assessment: 0-10  Pain Score: 2    Objective   Cognition:  Overall Cognitive Status: Within Functional Limits           Home Living:  Type of Home: House (Lives in ranch style home with spouse 2 [address]. Independent with ADLs and IADLs, functional mobility no AD, tub/shower, drives)  Prior Function:     IADL History:     ADL:  ADL Comments: Pt. unsteady during supported standing duringf transitional x2 LOB MIN A for self -correction, poor activity tolerance. Anticipate CGA to  MIN A for ADLs. Pt.reporting she was able to perform hygiene associated toileting with supervision from spouse to urinate.  Activity Tolerance:  Endurance: Tolerates 10 - 20 min exercise with multiple rests  Bed Mobility/Transfers: Bed Mobility  Bed Mobility:  (MIN A supine <>sit using log roll technique, cues for use of UE when transitioning from sidlying to sit)   and Transfers  Transfer:  (MIN A for STS, cues for carryover of abdominal precautions)   Modalities:     Vision:Vision - Basic Assessment  Current Vision: No visual deficits  Sensation:  Sensation Comment: Pt denies numbness and tingling  Strength:  Strength Comments: no MMT applied d/t abdominal precautions, per clinical observation pt appears to be at 4(-)/5 per MMT  Perception:     Coordination:  Movements are Fluid and Coordinated: Yes   Hand Function:  Gross Grasp: Functional  Coordination: Functional  Extremities: RUE   RUE :  Within Functional Limits   LUE: Within Functional Limits        Outcome Measures:[facility] Daily Activity  Putting on and taking off regular lower body clothing: A lot  Bathing (including washing, rinsing, drying): A lot  Putting on and taking off regular upper body clothing: A little  Toileting, which includes using toilet, bedpan or urinal: A little  Taking care of personal grooming such as brushing teeth: A little  Eating Meals: None  Daily Activity - Total Score: 17        Education Documentation  Body Mechanics, taught by Fely Trotter OT at 10/16/2023  3:52 PM.  Learner: Family, Patient  Readiness: Acceptance  Method: Demonstration, Teach-back  Response: Verbalizes Understanding, Demonstrated Understanding    Precautions, taught by Fely Trotter OT at 10/16/2023  3:52 PM.  Learner: Family, Patient  Readiness: Acceptance  Method: Demonstration, Teach-back  Response: Verbalizes Understanding, Demonstrated Understanding    Education Comments  No comments found.        OP EDUCATION:  Education  Individual(s) Educated: Patient, Spouse  Education Provided: Risk and benefits of OT discussed with patient or other, Fall precautons, POC discussed and agreed upon  Patient/Caregiver Demonstrated Understanding: yes  Plan of Care Discussed and Agreed Upon: yes  Patient Response to Education: Patient/Caregiver Verbalized Understanding of Information, Patient/Caregiver Performed Return Demonstration of Exercises/Activities, Patient/Caregiver Asked Appropriate Questions    Goals:  Encounter Problems       Encounter Problems (Active)       ADLs       Patient will perform UB and LB bathing  with supervision level of assistance. (Progressing)       Start:  10/16/23    Expected End:  10/30/23            Patient with complete upper body dressing with supervision level of assistance donning and doffing all UE clothes while edge of bed  (Progressing)       Start:  10/16/23    Expected End:  10/30/23            Patient with complete lower  body dressing with supervision level of assistance donning and doffing all LE clothes  with reacher while edge of bed  and standing (Progressing)       Start:  10/16/23    Expected End:  10/30/23            Patient will complete toileting including hygiene clothing management/hygiene with supervision level of assistance. (Progressing)       Start:  10/16/23    Expected End:  10/30/23               BALANCE       Patient will tolerate standing for 5-6 minutes to supervision level of assistance with least restrictive device in order to improve functional activity tolerance for ADL tasks. (Progressing)       Start:  10/16/23    Expected End:  10/30/23               MOBILITY       Patient will perform Functional mobility mod  Household distances/Community Distances with supervision level of assistance and least restrictive device in order to improve safety and functional mobility. (Progressing)       Start:  10/16/23    Expected End:  10/30/23

## 2023-10-16 NOTE — PROGRESS NOTES
Physical Therapy    Physical Therapy Evaluation    Patient Name: Xochitl Pichardo  MRN: [id number]  Today's Date: 10/16/2023   Time Calculation  Start Time: 1256  Stop Time: 1330  Time Calculation (min): 34 min    Assessment/Plan   PT Assessment  PT Assessment Results: Decreased endurance, Impaired balance, Decreased mobility  Rehab Prognosis: Good  Evaluation/Treatment Tolerance: Patient limited by fatigue, Patient limited by pain  Medical Staff Made Aware: Yes  Strengths: Support of extended family/friends  End of Session Communication: Bedside nurse  Assessment Comment: PT. M OVOING GUARDEDLY, REVIEDED BED M OBOTIY AND AMB SAFETY POST ABDOM INAL SURG, AMB IN ROOM MIN A  End of Session Patient Position: Up in chair, Alarm off, not on at start of session (SPOUSE PRESENT)  IP OR SWING BED PT PLAN  Inpatient or Swing Bed: Inpatient  PT Plan  Treatment/Interventions: Bed mobility, Transfer training, Gait training, Stair training, Endurance training  PT Plan:  (TBD)  PT Frequency: 5 times per week  PT Discharge Recommendations:  (TBD)  Equipment Recommended upon Discharge:  (TBD)  PT Recommended Transfer Status: Assist x1      Subjective   General Visit Information:  General  Reason for Referral: RECENT SURG, ABDOMINAL  Referred By: ASHLEY  Past Medical History Relevant to Rehab: ABOMINAL PAIN, N/V, ANAL BLEED; DX: SIGMOID MASS, EXPLOR LAP, COLOSTOMY; HX: MORGELLOUS SYNDROME  Family/Caregiver Present: Yes  Co-Treatment: OT  Prior to Session Communication: Bedside nurse  Patient Position Received: Bed, 3 rail up, Alarm off, not on at start of session (SPOUSE PRESENT)  Preferred Learning Style: verbal, kinesthetic  General Comment: IV, ALERT MILDLY ANXIOUS ABOUT MOBITLY BUT AGREES TO WORKWITH THERAPY  Home Living:  Home Living  Home Living Comments: 1 LEVEL HOUSE 2 [address], SPOUSE, INDEP AMB AND ADL, TUB SHOWER, DOES CHORES WORKS FROM HOME, DRIVES  Prior Level of Function:     Precautions:  Precautions  Medical Precautions:  Abdominal precautions  Post-Surgical Precautions: Abdominal surgery precautions  Vital Signs:       Objective   Pain:  Pain Assessment  Pain Assessment: 0-10  Pain Score:  (0/10 AT REST AND WITH ACTIVITY 2/10)  Pain Location: Abdomen  Cognition:  Cognition  Overall Cognitive Status: Within Functional Limits                         Sensation  Sensation Comment: NO C/O    Strength  Strength Comments: ROM AND STRENGTHIN LEGS WFL, DECREASED MUSCUALRE ENDURANCE                Postural Control  Posture Comment: WFL, STANDS GUARDEDLY DUE TO ABDOMINAL PAIN    Static Sitting Balance  Static Sitting-Comment/Number of Minutes: 2 MIN GOOD    Static Standing Balance  Static Standing-Comment/Number of Minutes: FAIR  Dynamic Standing Balance  Dynamic Standing-Comments: FAIR  Functional Assessments:  Bed Mobility  Bed Mobility:  (PRACTICED LOG LUCITA WITH MIN A & VC, 2 REPS SUPINE>SIT, AND 1 REP SIT>SUPINE)    Transfers  Transfer:  (SIT<>STND MIN A, VC FOR PRECAUTIONS)    Ambulation/Gait Training  Ambulation/Gait Training Performed:  (AMB IN ROOM MIN A 30 FT, 2 LOB W/ A TO CORRECT, LOB DURING TURNS, FATIGUED WITH AMB)                      Outcome Measures:  [facility] Basic Mobility  Turning from your back to your side while in a flat bed without using bedrails: A little  Moving from lying on your back to sitting on the side of a flat bed without using bedrails: A little  Moving to and from bed to chair (including a wheelchair): A little  Standing up from a chair using your arms (e.g. wheelchair or bedside chair): A little  To walk in hospital room: A little  Climbing 3-5 steps with railing: A lot  Basic Mobility - Total Score: 17    Encounter Problems       Encounter Problems (Active)       Mobility       SIT<>STAND (Not Progressing)       Start:  10/16/23    Expected End:  10/20/23       SBA FOLLOWING PRECAUTIONS         GAIT (Not Progressing)       Start:  10/16/23    Expected End:  10/20/23       100 + FT SBA            Pain - Adult           Transfers       STG - Patient to transfer to and from sit to supine (Not Progressing)       Start:  10/16/23    Expected End:  10/20/23       LOG ROLLING SBA                Education Documentation  Precautions, taught by Maira Khan, PT at 10/16/2023  2:57 PM.  Learner: Significant Other, Patient  Readiness: Acceptance  Method: Explanation  Response: Verbalizes Understanding, Demonstrated Understanding  Comment: ABDOMINAL PRECAUTIONS, SPLINTING WITH PILLOW, LOG ROLLING    Mobility Training, taught by Maira Khan, PT at 10/16/2023  2:57 PM.  Learner: Significant Other, Patient  Readiness: Acceptance  Method: Explanation  Response: Demonstrated Understanding, Verbalizes Understanding    Education Comments  No comments found.

## 2023-10-16 NOTE — SIGNIFICANT EVENT
"Patient seen for assistance with ostomy education and pouch change related to new loop colostomy. Patient reports pain is more controlled since reviewing plan of care with ABRAHAM Clifton this AM and now agreeable to pouch change and education. Patient requested spouse and son at bedside to step out during ostomy care and education review.     Assessment  Ostomy Type: loop colostomy   Size: 1 1/2 x 2 1/4\" oval Color: red Protruding: budded  Functioning: semi-liquid, brown   Mucocutaneous junction: intact, bridge in place  Peristomal Skin: intact -no sting barrier applied  Pouching: one piece flat drainable pouch offset from midline incision, barrier ring  Education: Patient receptive and eager to learn. Education review of [employer] program; literature including: Routine Care of Your Ostomy, One-Piece Pouching System, [employer] Barrier Rings, and Lock 'n Roll Microseal Closure; ostomy supplies including demonstration and appliance change. Patient participated in practice with with opening and re-closing pouch. Patient also assisted with removing barrier, cutting, and fitting barrier over her stoma during appliance change. Patient was able to visualize stoma and peristomal skin during appliance change despite some hesitation. Therapuetic support provided by this RN throughout care. All questions answered to patient satisfaction with encouragement provided appropriately during interventions. No barriers to discharge identified during assessment and education review. Patient demonstrates willingness to learn and participating well in care.     Plan:   Assess stoma and assist patient with appliance changes 2x/week. Next pouch change Thursday if inpatient. Bedside nursing to continue reinforcement of ostomy teaching and involving patient in her care, continue encouragement of questions and participation. I would recommend enrolling patient in [employer] services and introduction to educational videos " prior to discharge. Tablet placed on  and notified bedside RN Lia prior to leaving unit.      Please contact Elodia Jonas, Wound and Ostomy Care RN for questions/further assistance.

## 2023-10-16 NOTE — PROGRESS NOTES
"Xochitl Pichardo is a 57 y.o. female on day 2 of admission presenting with Colonic mass.    Subjective   Pt stating pain no longer controlled.  Toradol was ordered for 5 doses, reordered for additional doses.  Ostomy working well. Still awaiting pre albumin to decide on further surgery. Ensure supplement added to diet.         Objective     Physical Exam Ostomy functioning with good stool output.  Abd otherwise soft tender at incision.  RRR, non labored breathing.     Last Recorded Vitals  Blood pressure 157/90, pulse 92, temperature 37 °C (98.6 °F), resp. rate 16, height 1.7 m (5' 6.93\"), weight 68 kg (149 lb 14.6 oz), SpO2 93 %.  Intake/Output last 3 Shifts:  I/O last 3 completed shifts:  In: 3750 (55.1 mL/kg) [P.O.:900; I.V.:1500 (22.1 mL/kg); IV Piggyback:1350]  Out: 420 (6.2 mL/kg) [Stool:420]  Weight: 68 kg     Relevant Results                             Assessment/Plan   Principal Problem:    Colonic mass  Active Problems:    Obstruction of colon (CMS/HCC)    Hypothyroidism    C/O gas pain. Taking in minimal PO. Will restart Toradol, encouraged to ambulate to help with gas pains.  Start ensure supplements. GasEx prn.    Await pre albumin levels still and biopsy.     I spent 30 minutes in the professional and overall care of this patient.      Estrellita Nguyen MD      "

## 2023-10-16 NOTE — PROGRESS NOTES
Visit Date: 10/16/2023      Patient Name: Xochitl Pichardo         MRN: [id number]             Patient seen for wound/ostomy consult. She is alert and oriented c/o 10/10 pain, worse when passing gas. Patient has new loop ostomy, POD #2. Introduced self and services to patient,  and daughter. Family voiced a lot of concerns about pain management, questions regarding her tumor. Bedside RN reached out to surgical resident via secure chat to have him come speak with patients family. Emptied 100 ml of brown pudding consistency stool from appliance. Plan was to meet family tomorrow for ostomy education however concern for ostomy needing changed today, will return once pain is better managed per patients request.       NEW    Colostomy Loop LLQ (Active)   Placement Date/Time: 10/14/23 (c)    Colostomy Type: Loop  Location: LLQ  Stoma Size (cm): (c)    Number of days: 2     Colostomy Loop LLQ (Active)   Stomal Appliance 1 piece 10/16/23 1200   Site/Stoma Assessment MADAI 10/16/23 1200   Peristomal Assessment MADAI 10/16/23 1200   Treatment Other (Comment) 10/16/23 1200   Drainage Characteristics Brown 10/16/23 1200   Output (mL) 100 mL 10/16/23 1200       Wound 10/14/23 Incision Abdomen Lower;Medial (Active)   Date First Assessed/Time First Assessed: 10/14/23 0003   Present on Original Admission: No  Hand Hygiene Completed: Yes  Primary Wound Type: Incision  Location: Abdomen  Wound Location Orientation: Lower;Medial   Number of days: 2     Wound 10/14/23 Incision Abdomen Lower;Medial (Active)   Site Assessment Clean;Intact 10/16/23 0958   Closure Approximated 10/16/23 0958   Sutures/Staple Line Approximated 10/16/23 0958   Drainage Description None 10/16/23 0958   Drainage Amount Scant 10/16/23 0958   Dressing ABD 10/16/23 0958   Dressing Status Clean;Dry 10/16/23 0958       Elodia Jonas RN  10/16/2023  12:49 PM

## 2023-10-17 PROCEDURE — 2500000004 HC RX 250 GENERAL PHARMACY W/ HCPCS (ALT 636 FOR OP/ED): Performed by: SURGERY

## 2023-10-17 PROCEDURE — 97116 GAIT TRAINING THERAPY: CPT | Mod: GP,CQ

## 2023-10-17 PROCEDURE — C9113 INJ PANTOPRAZOLE SODIUM, VIA: HCPCS | Performed by: SURGERY

## 2023-10-17 PROCEDURE — 1210000001 HC SEMI-PRIVATE ROOM DAILY

## 2023-10-17 PROCEDURE — 2580000001 HC RX 258 IV SOLUTIONS: Performed by: SURGERY

## 2023-10-17 PROCEDURE — 2500000001 HC RX 250 WO HCPCS SELF ADMINISTERED DRUGS (ALT 637 FOR MEDICARE OP): Performed by: SURGERY

## 2023-10-17 RX ORDER — MAGNESIUM SULFATE HEPTAHYDRATE 40 MG/ML
2 INJECTION, SOLUTION INTRAVENOUS ONCE
Status: COMPLETED | OUTPATIENT
Start: 2023-10-17 | End: 2023-10-17

## 2023-10-17 RX ORDER — POTASSIUM CHLORIDE 14.9 MG/ML
20 INJECTION INTRAVENOUS
Status: COMPLETED | OUTPATIENT
Start: 2023-10-17 | End: 2023-10-17

## 2023-10-17 RX ADMIN — KETOROLAC TROMETHAMINE 15 MG: 30 INJECTION, SOLUTION INTRAMUSCULAR; INTRAVENOUS at 20:47

## 2023-10-17 RX ADMIN — CEFOXITIN SODIUM 2 G: 2 POWDER, FOR SOLUTION INTRAVENOUS at 06:07

## 2023-10-17 RX ADMIN — SIMETHICONE 80 MG: 80 TABLET, CHEWABLE ORAL at 23:51

## 2023-10-17 RX ADMIN — SIMETHICONE 80 MG: 80 TABLET, CHEWABLE ORAL at 09:52

## 2023-10-17 RX ADMIN — OXYCODONE HYDROCHLORIDE AND ACETAMINOPHEN 1 TABLET: 5; 325 TABLET ORAL at 13:33

## 2023-10-17 RX ADMIN — SIMETHICONE 80 MG: 80 TABLET, CHEWABLE ORAL at 06:07

## 2023-10-17 RX ADMIN — HYDROMORPHONE HYDROCHLORIDE 0.6 MG: 1 INJECTION, SOLUTION INTRAMUSCULAR; INTRAVENOUS; SUBCUTANEOUS at 01:20

## 2023-10-17 RX ADMIN — CEFOXITIN SODIUM 2 G: 2 POWDER, FOR SOLUTION INTRAVENOUS at 19:35

## 2023-10-17 RX ADMIN — SIMETHICONE 80 MG: 80 TABLET, CHEWABLE ORAL at 19:34

## 2023-10-17 RX ADMIN — SODIUM CHLORIDE, POTASSIUM CHLORIDE, SODIUM LACTATE AND CALCIUM CHLORIDE 50 ML/HR: 600; 310; 30; 20 INJECTION, SOLUTION INTRAVENOUS at 20:54

## 2023-10-17 RX ADMIN — KETOROLAC TROMETHAMINE 15 MG: 30 INJECTION, SOLUTION INTRAMUSCULAR; INTRAVENOUS at 03:54

## 2023-10-17 RX ADMIN — CEFOXITIN SODIUM 2 G: 2 POWDER, FOR SOLUTION INTRAVENOUS at 12:04

## 2023-10-17 RX ADMIN — POTASSIUM CHLORIDE 20 MEQ: 14.9 INJECTION, SOLUTION INTRAVENOUS at 09:37

## 2023-10-17 RX ADMIN — MAGNESIUM SULFATE HEPTAHYDRATE 2 G: 40 INJECTION, SOLUTION INTRAVENOUS at 07:32

## 2023-10-17 RX ADMIN — POTASSIUM CHLORIDE 20 MEQ: 14.9 INJECTION, SOLUTION INTRAVENOUS at 11:36

## 2023-10-17 RX ADMIN — KETOROLAC TROMETHAMINE 15 MG: 30 INJECTION, SOLUTION INTRAMUSCULAR; INTRAVENOUS at 09:36

## 2023-10-17 RX ADMIN — KETOROLAC TROMETHAMINE 15 MG: 30 INJECTION, SOLUTION INTRAMUSCULAR; INTRAVENOUS at 15:41

## 2023-10-17 RX ADMIN — PANTOPRAZOLE SODIUM 40 MG: 40 INJECTION, POWDER, FOR SOLUTION INTRAVENOUS at 09:36

## 2023-10-17 RX ADMIN — CEFOXITIN SODIUM 2 G: 2 POWDER, FOR SOLUTION INTRAVENOUS at 00:02

## 2023-10-17 ASSESSMENT — COGNITIVE AND FUNCTIONAL STATUS - GENERAL
WALKING IN HOSPITAL ROOM: A LITTLE
MOVING TO AND FROM BED TO CHAIR: A LITTLE
TOILETING: A LITTLE
DRESSING REGULAR LOWER BODY CLOTHING: A LITTLE
TURNING FROM BACK TO SIDE WHILE IN FLAT BAD: A LITTLE
CLIMB 3 TO 5 STEPS WITH RAILING: A LITTLE
HELP NEEDED FOR BATHING: A LITTLE
MOBILITY SCORE: 22
TOILETING: A LITTLE
DAILY ACTIVITIY SCORE: 21
WALKING IN HOSPITAL ROOM: A LITTLE
DRESSING REGULAR LOWER BODY CLOTHING: A LITTLE
HELP NEEDED FOR BATHING: A LITTLE
DAILY ACTIVITIY SCORE: 21
MOBILITY SCORE: 23
MOBILITY SCORE: 21
CLIMB 3 TO 5 STEPS WITH RAILING: A LITTLE

## 2023-10-17 ASSESSMENT — PAIN - FUNCTIONAL ASSESSMENT
PAIN_FUNCTIONAL_ASSESSMENT: 0-10
PAIN_FUNCTIONAL_ASSESSMENT: 0-10

## 2023-10-17 ASSESSMENT — PAIN SCALES - GENERAL
PAINLEVEL_OUTOF10: 7
PAINLEVEL_OUTOF10: 0 - NO PAIN
PAINLEVEL_OUTOF10: 2
PAINLEVEL_OUTOF10: 5 - MODERATE PAIN

## 2023-10-17 NOTE — CONSULTS
"  Wound Care Consult     Visit Date: 10/17/2023      Patient Name: Xochitl Picharod         MRN: [id number]           YOB: 1965     A 57 year old female patient who is POD # 3 seen for ostomy education related to new loop colostomy. Patient reports some pain at this time, mediated by bedside RN and agreeable to pouch change and education. Patient requested spouse and son step out during ostomy care.      Assessment  Ostomy Type: loop colostomy   Size: 1 1/2 x 2 1/4\" oval       Color: red         Protruding: budded  Functioning: semi-liquid, brown   Mucocutaneous junction: intact, bridge in place  Peristomal Skin: intact -no sting barrier applied  Pouching: one piece flat drainable pouch offset from midline incision, barrier ring  Education: Patient receptive and eager to learn. Education review of [employer] start program; literature including: Routine Care of Your Ostomy, One-Piece Pouching System, Adapt Barrier Rings, and Lock 'n Roll Microseal Closure; ostomy supplies including demonstration and appliance change. Patient participated in practice with opening and re-closing pouch. Patient also assisted with removing barrier, cutting, and fitting barrier over her stoma during appliance change. Patient was able to visualize stoma and peristomal skin during appliance change. Patient has been emptying contents of pouch with assistance. All questions answered to patient satisfaction with encouragement provided appropriately during interventions. No barriers to discharge identified during assessment and education review. Patient demonstrates willingness to learn and participating well in care.    Plan:   Assess stoma and assist patient with appliance changes 2x/week. Next pouch change Friday if inpatient. Bedside nursing to continue reinforcement of ostomy teaching and involving patient in her care, continue encouragement of questions and participation. Patient agreeable to enrolling patient in " [employer] services and introduction to educational videos prior to discharge. Bedside RN Juana to provide tablet for patient to begin watching videos. [employer] QR code sheet also left at bedside for family to utilize.    Please contact me with questions or changes in patient condition.  Elodia Jonas RN  Wound/Ostomy Care   [phone]    Overall spent 90 minutes with patient during this visit.     Elodia Jonas RN  10/17/2023  3:07 PM

## 2023-10-17 NOTE — CARE PLAN
The patient's goals for the shift include  to have decreased pain by utilizing pharmacological and non-pharmacological interventions    The clinical goals for the shift include Patient will be free from pain by end of shift.    Over the shift, the patient did not make progress toward the following goals. Barriers to progression include N/A.

## 2023-10-17 NOTE — PROGRESS NOTES
Physical Therapy    Physical Therapy Treatment    Patient Name: Xochitl Pichardo  MRN: [id number]  Today's Date: 10/17/2023  Time Calculation  Start Time: 0945  Stop Time: 1000  Time Calculation (min): 15 min       Assessment/Plan   PT Assessment  PT Assessment Results: Decreased strength, Decreased endurance, Decreased cognition, Decreased safety awareness, Pain  Rehab Prognosis: Excellent  Evaluation/Treatment Tolerance: Patient limited by fatigue, Patient limited by pain  Medical Staff Made Aware: Yes  Strengths: Support of extended family/friends  End of Session Communication: Bedside nurse, PCT/NA/CTA  Assessment Comment: josee incresed giat by 400 feet swing thru good step length and chapin. spoke with PT hodl patient for a couple of days due to goals met, but patient may need further surgery later in week  End of Session Patient Position: Up in chair, Alarm off, caregiver present  PT Plan  Inpatient/Swing Bed or Outpatient: Inpatient  PT Plan  Treatment/Interventions: Gait training, Endurance training  PT Plan:  (TBD)  PT Frequency: 5 times per week  PT Discharge Recommendations:  (TBD)  Equipment Recommended upon Discharge:  (TBD)  PT Recommended Transfer Status: Assist x1      General Visit Information:   PT  Visit  PT Received On: 10/17/23  Response to Previous Treatment: Patient with no complaints from previous session.  General  Family/Caregiver Present: Yes  Caregiver Feedback: daughter (anxious and wanting tohelp her mother)  Prior to Session Communication: Bedside nurse, PCT/NA/CTA  Patient Position Received: Bed, 3 rail up    Subjective   Precautions:  Precautions  Medical Precautions: Abdominal precautions  Post-Surgical Precautions: Abdominal surgery precautions  Vital Signs:       Objective   Pain:  Pain Assessment  Pain Assessment: 0-10  Pain Score: 5 - Moderate pain  Pain Type: Surgical pain  Pain Location: Abdomen  Pain Interventions: Medication (See MAR), Ambulation/increased activity,  Distraction  Cognition:  Cognition  Overall Cognitive Status: Within Functional Limits  Postural Control:     Extremity/Trunk Assessments:    Activity Tolerance:  Activity Tolerance  Endurance: Tolerates 30 min exercise with multiple rests  Treatments:  Bed Mobility  Bed Mobility: Yes  Bed Mobility 1  Bed Mobility 1: Supine to sitting, Sitting to supine  Level of Assistance 1: Contact guard  Bed Mobility Comments 1: with verbal cuesfor breathing    Ambulation/Gait Training  Ambulation/Gait Training Performed: Yes  Ambulation/Gait Training 1  Surface 1: Level tile  Assistance 1: Close supervision  Comments/Distance (ft) 1: gait training without devicce 400 feet with supervision  Transfers  Transfer: Yes  Transfer 1  Transfer From 1: Sit to, Stand to  Transfer to 1: Chair with arms  Transfer Level of Assistance 1: Contact guard    Outcome Measures:  [facility] Basic Mobility  Turning from your back to your side while in a flat bed without using bedrails: None  Moving from lying on your back to sitting on the side of a flat bed without using bedrails: A little  Moving to and from bed to chair (including a wheelchair): None  Standing up from a chair using your arms (e.g. wheelchair or bedside chair): None  To walk in hospital room: None  Climbing 3-5 steps with railing: None  Basic Mobility - Total Score: 23    Education Documentation  Precautions, taught by Diane Moreno PTA at 10/17/2023 11:10 AM.  Learner: Family, Patient  Readiness: Acceptance  Method: Explanation  Response: Verbalizes Understanding    Mobility Training, taught by Diane Moreno PTA at 10/17/2023 11:10 AM.  Learner: Family, Patient  Readiness: Acceptance  Method: Explanation  Response: Verbalizes Understanding    Education Comments  No comments found.        OP EDUCATION:       Encounter Problems       Encounter Problems (Active)       Pain - Adult             Encounter Problems (Resolved)       Mobility       SIT<>STAND (Met)       Start:  10/16/23     Expected End:  10/20/23    Resolved:  10/17/23    SBA FOLLOWING PRECAUTIONS         GAIT (Met)       Start:  10/16/23    Expected End:  10/20/23    Resolved:  10/17/23    100 + FT SBA            Transfers       STG - Patient to transfer to and from sit to supine (Met)       Start:  10/16/23    Expected End:  10/20/23    Resolved:  10/17/23    LOG ROLLING SBA

## 2023-10-17 NOTE — PROGRESS NOTES
"Xochitl Pichardo is a 57 y.o. female on day 3 of admission presenting with Colonic mass.    Subjective   Patient seen and assessed.  States she is eating a little bit more slowly.  States her pain is somewhat better controlled now.  Denies any nausea       Objective     Gen: NAD.  A&Ox3  HEENT: NC/AT.  Moist mucous membranes.  Neck: Normal range of motion.  CV: Regular rate.  Chest: Normal chest rise.  Normal respiratory effort.  Abdomen: Soft.  Ostomy in place with appropriate enteric output.  Mildly/appropriately tender to palpation.  Minimally distended.  No rigidity or guarding.  Extremities: No edema.  Moving all extremities.     Last Recorded Vitals  Blood pressure 122/79, pulse 90, temperature 36.8 °C (98.2 °F), resp. rate 16, height 1.7 m (5' 6.93\"), weight 68 kg (149 lb 14.6 oz), SpO2 93 %.  Intake/Output last 3 Shifts:  I/O last 3 completed shifts:  In: 1295 (19 mL/kg) [P.O.:500; I.V.:795 (11.7 mL/kg)]  Out: 778 (11.4 mL/kg) [Stool:778]  Weight: 68 kg     Relevant Results             Assessment/Plan   Principal Problem:    Colonic mass  Active Problems:    Obstruction of colon (CMS/HCC)    Hypothyroidism    Patient is a 57-year-old female with obstructing colonic mass.  - awaiting final radiology read of completion CT c/a/p w/ IV contrast  - Continue diet  - Encouraged ambulation, IS  - No further intervention at this time  - Monitor vitals  - Discussed with Dr. Wendy Ramirez, PGY4  General Surgery  "

## 2023-10-17 NOTE — PROGRESS NOTES
Occupational Therapy                 Therapy Communication Note    Patient Name: Xochitl Pichardo  MRN: [id number]  Today's Date: 10/17/2023     Discipline: Occupational Therapy    Missed Visit Reason:  Ostomy care    Missed Time: Attempt    Comment:  pt attempted in the am and the pm. Ptin the am had just finished PT tx and was hooked up to the IV. Attempted pt in the pm and she was receiving ostomy care and stated she had walked with her Family 2 more times and she was tired.

## 2023-10-18 LAB
ANION GAP SERPL CALC-SCNC: 12 MMOL/L (ref 10–20)
BUN SERPL-MCNC: 8 MG/DL (ref 6–23)
CALCIUM SERPL-MCNC: 8.1 MG/DL (ref 8.6–10.3)
CHLORIDE SERPL-SCNC: 104 MMOL/L (ref 98–107)
CO2 SERPL-SCNC: 25 MMOL/L (ref 21–32)
CREAT SERPL-MCNC: 0.51 MG/DL (ref 0.5–1.05)
GFR SERPL CREATININE-BSD FRML MDRD: >90 ML/MIN/1.73M*2
GLUCOSE SERPL-MCNC: 86 MG/DL (ref 74–99)
MAGNESIUM SERPL-MCNC: 1.74 MG/DL (ref 1.6–2.4)
POTASSIUM SERPL-SCNC: 3.2 MMOL/L (ref 3.5–5.3)
SODIUM SERPL-SCNC: 138 MMOL/L (ref 136–145)

## 2023-10-18 PROCEDURE — 36415 COLL VENOUS BLD VENIPUNCTURE: CPT | Performed by: SURGERY

## 2023-10-18 PROCEDURE — 2500000004 HC RX 250 GENERAL PHARMACY W/ HCPCS (ALT 636 FOR OP/ED): Performed by: SURGERY

## 2023-10-18 PROCEDURE — 80048 BASIC METABOLIC PNL TOTAL CA: CPT | Performed by: SURGERY

## 2023-10-18 PROCEDURE — 1210000001 HC SEMI-PRIVATE ROOM DAILY

## 2023-10-18 PROCEDURE — C9113 INJ PANTOPRAZOLE SODIUM, VIA: HCPCS | Performed by: SURGERY

## 2023-10-18 PROCEDURE — 83735 ASSAY OF MAGNESIUM: CPT | Performed by: SURGERY

## 2023-10-18 PROCEDURE — 2500000001 HC RX 250 WO HCPCS SELF ADMINISTERED DRUGS (ALT 637 FOR MEDICARE OP): Performed by: SURGERY

## 2023-10-18 RX ORDER — POTASSIUM CHLORIDE 14.9 MG/ML
20 INJECTION INTRAVENOUS
Status: ACTIVE | OUTPATIENT
Start: 2023-10-18 | End: 2023-10-18

## 2023-10-18 RX ADMIN — SIMETHICONE 80 MG: 80 TABLET, CHEWABLE ORAL at 12:22

## 2023-10-18 RX ADMIN — CEFOXITIN SODIUM 2 G: 2 POWDER, FOR SOLUTION INTRAVENOUS at 12:21

## 2023-10-18 RX ADMIN — KETOROLAC TROMETHAMINE 15 MG: 30 INJECTION, SOLUTION INTRAMUSCULAR; INTRAVENOUS at 03:44

## 2023-10-18 RX ADMIN — PANTOPRAZOLE SODIUM 40 MG: 40 INJECTION, POWDER, FOR SOLUTION INTRAVENOUS at 09:41

## 2023-10-18 RX ADMIN — SIMETHICONE 80 MG: 80 TABLET, CHEWABLE ORAL at 06:47

## 2023-10-18 RX ADMIN — CEFOXITIN SODIUM 2 G: 2 POWDER, FOR SOLUTION INTRAVENOUS at 00:59

## 2023-10-18 RX ADMIN — CEFOXITIN SODIUM 2 G: 2 POWDER, FOR SOLUTION INTRAVENOUS at 06:47

## 2023-10-18 RX ADMIN — KETOROLAC TROMETHAMINE 15 MG: 30 INJECTION, SOLUTION INTRAMUSCULAR; INTRAVENOUS at 09:39

## 2023-10-18 RX ADMIN — CEFOXITIN SODIUM 2 G: 2 POWDER, FOR SOLUTION INTRAVENOUS at 18:08

## 2023-10-18 ASSESSMENT — COGNITIVE AND FUNCTIONAL STATUS - GENERAL
TOILETING: A LITTLE
DRESSING REGULAR LOWER BODY CLOTHING: A LITTLE
MOBILITY SCORE: 22
DAILY ACTIVITIY SCORE: 21
WALKING IN HOSPITAL ROOM: A LITTLE
CLIMB 3 TO 5 STEPS WITH RAILING: A LITTLE
HELP NEEDED FOR BATHING: A LITTLE

## 2023-10-18 ASSESSMENT — PAIN - FUNCTIONAL ASSESSMENT
PAIN_FUNCTIONAL_ASSESSMENT: 0-10

## 2023-10-18 ASSESSMENT — PAIN SCALES - GENERAL
PAINLEVEL_OUTOF10: 3
PAINLEVEL_OUTOF10: 0 - NO PAIN
PAINLEVEL_OUTOF10: 3
PAINLEVEL_OUTOF10: 3

## 2023-10-18 NOTE — PROGRESS NOTES
Met with patient to discuss home care.  Agreeable but unsure where she will be staying at discharge.  Still with significant abdominal pain when she eats.  Will continue to follow.

## 2023-10-18 NOTE — CONSULTS
Wound Care Consult     Visit Date: 10/18/2023      Patient Name: Xochitl Pichardo         MRN: [id number]           YOB: 1965    A 57 year old female patient who is POD # 4 seen for ostomy appliance check and follow up on any questions or concerns. Enrolled patient in [place] secure start yesterday after exam. Patient given charged tablet to watch [employer] educational videos on. Patient reports she is ambulating and eating better however still feels weak and is hoping to continue to regain her strength. Patient reports assisting staff with emptying pouch. Care package at bedside to accompany patient on discharge.  Current appliance that we placed yesterday is clean and intact without leaking noted. All questions answered to patient and husbands satisfactory.     Plan:   Assess stoma and assist patient with appliance changes 2x/week. Next pouch change Friday if inpatient. Bedside nursing to continue reinforcement of ostomy teaching and involving patient in her care, continue encouragement of questions and participation. Notify provider/Wound Care RN if barriers to discharge are identified.     Please contact me with questions or changes in patients condition.   Elodia Jonas RN  Wound/Ostomy Care    [phone]     Overall spent 60 minutes with patient during this visit.         Elodia Jonas RN  10/18/2023  5:23 PM

## 2023-10-18 NOTE — CARE PLAN
The patient's goals for the shift include  decreased pain.     The clinical goals for the shift include Patient will have reduced pain by end of shift    Over the shift, the patient did not make progress toward the following goals. Barriers to progression include ***. Recommendations to address these barriers include ***.

## 2023-10-18 NOTE — CARE PLAN
The patient's goals for the shift include  having pain managed during shift    The clinical goals for the shift include Patient will have reduced pain by end of shift    Over the shift, the patient did make progress toward the following goals.  Patient was able to be medicated with just scheduled Toradol and PRN simethicone during shift.

## 2023-10-18 NOTE — PROGRESS NOTES
"Xochitl Pichardo is a 57 y.o. female on day 4 of admission presenting with Colonic mass.    Subjective   Feeling little better tolerating diet, still requiring IV dilaudid.        Objective     Physical Exam Ostomy functioning well, Incision staples intact. Abd otherwise soft.    Last Recorded Vitals  Blood pressure 125/82, pulse 84, temperature 37.1 °C (98.7 °F), temperature source Temporal, resp. rate 18, height 1.7 m (5' 6.93\"), weight 68 kg (149 lb 14.6 oz), SpO2 94 %.  Intake/Output last 3 Shifts:  I/O last 3 completed shifts:  In: 2589.3 (38.1 mL/kg) [P.O.:760; I.V.:1279.3 (18.8 mL/kg); IV Piggyback:550]  Out: 850 (12.5 mL/kg) [Stool:850]  Weight: 68 kg     Relevant Results                             Assessment/Plan   Principal Problem:    Colonic mass  Active Problems:    Obstruction of colon (CMS/HCC)    Hypothyroidism    S/P diverting loop colostomy.  Would like to see nutritional status improved prior to sigmoid resection.  Discharge planning. Long discussion with pt and her family.        I spent 45 minutes in the professional and overall care of this patient.      Estrellita Nguyen MD      "

## 2023-10-19 LAB
BACTERIA FLD CULT: NORMAL
GRAM STN SPEC: NORMAL
GRAM STN SPEC: NORMAL

## 2023-10-19 PROCEDURE — 1210000001 HC SEMI-PRIVATE ROOM DAILY

## 2023-10-19 PROCEDURE — 97530 THERAPEUTIC ACTIVITIES: CPT | Mod: GO,CO

## 2023-10-19 PROCEDURE — C9113 INJ PANTOPRAZOLE SODIUM, VIA: HCPCS | Performed by: SURGERY

## 2023-10-19 PROCEDURE — 2500000001 HC RX 250 WO HCPCS SELF ADMINISTERED DRUGS (ALT 637 FOR MEDICARE OP): Performed by: SURGERY

## 2023-10-19 PROCEDURE — 2500000004 HC RX 250 GENERAL PHARMACY W/ HCPCS (ALT 636 FOR OP/ED): Performed by: SURGERY

## 2023-10-19 RX ADMIN — CEFOXITIN SODIUM 2 G: 2 POWDER, FOR SOLUTION INTRAVENOUS at 00:09

## 2023-10-19 RX ADMIN — CEFOXITIN SODIUM 2 G: 2 POWDER, FOR SOLUTION INTRAVENOUS at 12:38

## 2023-10-19 RX ADMIN — CEFOXITIN SODIUM 2 G: 2 POWDER, FOR SOLUTION INTRAVENOUS at 05:28

## 2023-10-19 RX ADMIN — PANTOPRAZOLE SODIUM 40 MG: 40 INJECTION, POWDER, FOR SOLUTION INTRAVENOUS at 09:14

## 2023-10-19 RX ADMIN — CEFOXITIN SODIUM 2 G: 2 POWDER, FOR SOLUTION INTRAVENOUS at 17:59

## 2023-10-19 RX ADMIN — SIMETHICONE 80 MG: 80 TABLET, CHEWABLE ORAL at 10:12

## 2023-10-19 ASSESSMENT — COGNITIVE AND FUNCTIONAL STATUS - GENERAL
DRESSING REGULAR LOWER BODY CLOTHING: A LITTLE
DAILY ACTIVITIY SCORE: 24
HELP NEEDED FOR BATHING: A LITTLE
TOILETING: A LITTLE
CLIMB 3 TO 5 STEPS WITH RAILING: A LITTLE
MOBILITY SCORE: 22
WALKING IN HOSPITAL ROOM: A LITTLE
DAILY ACTIVITIY SCORE: 21

## 2023-10-19 ASSESSMENT — PAIN - FUNCTIONAL ASSESSMENT
PAIN_FUNCTIONAL_ASSESSMENT: 0-10

## 2023-10-19 ASSESSMENT — PAIN SCALES - GENERAL
PAINLEVEL_OUTOF10: 0 - NO PAIN
PAINLEVEL_OUTOF10: 2

## 2023-10-19 ASSESSMENT — ENCOUNTER SYMPTOMS
CARDIOVASCULAR NEGATIVE: 1
RESPIRATORY NEGATIVE: 1
CONSTITUTIONAL NEGATIVE: 1
ABDOMINAL PAIN: 1

## 2023-10-19 NOTE — PROGRESS NOTES
Occupational Therapy    OT Treatment    Patient Name: Xochitl Pichardo  MRN: [id number]  Today's Date: 10/19/2023  Time Calculation  Start Time: 1503  Stop Time: 1513  Time Calculation (min): 10 min         Assessment:  OT Assessment: pt. doing exceptional well with ADLS transfers. She is meeting all goal areas and ready for d/c Pt. encouraged to continue moving around the room and on the floor with nursing. Pt. has good understanding of EC tech.       Plan:        Subjective  Pt. Reports she is feeling much better.      General:     Patient Position Received: Alarm off, not on at start of session, Bed, 2 rail up  General Comment: OX4 Pt.agreeable to demonstrate ADLS and transfers she is functioning at high level meeting all goals, Spoke with OTR patient ready for d/c OT.    Vital Signs:       Pain:  Pain Assessment  Pain Assessment: 0-10  Pain Score: 0 - No pain (reports soreness.)  Pain Location: Abdomen  Objective      Activities of Daily Living:    Grooming  Grooming Level of Assistance: Independent  Grooming Where Assessed:  (standing by tray table)  UE Bathing  UE Bathing Level of Assistance:  (pt. reports she is bathing self)  LE Bathing  LE Bathing Level of Assistance:  (pt. reports she is bathing self)  UE Dressing  UE Dressing Level of Assistance:  (pt. reports she is dressing self.)  LE Dressing  LE Dressing: Yes  Sock Level of Assistance: Independent  LE Dressing Comments: educated on cross leg tech. patient was sucessful  Toileting  Toileting Level of Assistance: Independent  Where Assessed: Toilet    Functional Standing Tolerance:  Time: 5 mins  Activity: standing brushing teeth.  Functional Standing Tolerance Comments: no loss of balance    Bed Mobility/Transfers: Bed Mobility  Bed Mobility: Yes  Bed Mobility 1  Bed Mobility 1: Supine to sitting, Sitting to supine  Level of Assistance 1: Independent  Transfers  Transfer: Yes  Transfer 1  Transfer From 1: Sit to, Stand to  Transfer to 1: Toilet, Chair  without arms  Technique 1: Sit to stand  Transfer Device 1:  (no AD)  Transfer Level of Assistance 1: Independent  Toilet Transfers  Toilet Transfer From: Bed  Toilet Transfer Type: To  Toilet Transfer to: Standard toilet  Toilet Transfer Technique: Ambulating  Toilet Transfers: Independent, Modified independence  Toilet Transfers Comments: x2 pt. demonstrated (using grab bar for balance.)             Therapy/Activity:          Balance/Neuromuscular Re-Education  Balance/Neuromuscular Re-Education Activity Performed: Yes  Balance/Neuromuscular Re-Education Activity 1: static and dynamic balance demonstrated with ADLS (Fair to good balance. No loss of balance pt., steady.)    Strength:       Other Activity:       Outcome Measures:[facility] Daily Activity  Putting on and taking off regular lower body clothing: None  Bathing (including washing, rinsing, drying): None  Putting on and taking off regular upper body clothing: None  Toileting, which includes using toilet, bedpan or urinal: None  Taking care of personal grooming such as brushing teeth: None  Eating Meals: None  Daily Activity - Total Score: 24  Education Documentation  Body Mechanics, taught by ANA Eric at 10/19/2023  3:32 PM.  Learner: Patient  Readiness: Acceptance  Method: Explanation, Demonstration  Response: Verbalizes Understanding, Demonstrated Understanding    Precautions, taught by ANA Eric at 10/19/2023  3:32 PM.  Learner: Patient  Readiness: Acceptance  Method: Explanation, Demonstration  Response: Verbalizes Understanding, Demonstrated Understanding    ADL Training, taught by ANA Eric at 10/19/2023  3:32 PM.  Learner: Patient  Readiness: Acceptance  Method: Explanation, Demonstration  Response: Verbalizes Understanding, Demonstrated Understanding    Education Comments  No comments found.        EDUCATION:  Education  Individual(s) Educated: Patient, Spouse  Education Provided: Risk and benefits of OT  discussed with patient or other, Fall precautons, POC discussed and agreed upon  Patient/Caregiver Demonstrated Understanding: yes  Plan of Care Discussed and Agreed Upon: yes  Patient Response to Education: Patient/Caregiver Verbalized Understanding of Information, Patient/Caregiver Performed Return Demonstration of Exercises/Activities, Patient/Caregiver Asked Appropriate Questions    Goals:  Encounter Problems       Encounter Problems (Resolved)       ADLs       Patient will perform UB and LB bathing  with supervision level of assistance. (Met)       Start:  10/16/23    Expected End:  10/30/23    Resolved:  10/19/23         Patient with complete upper body dressing with supervision level of assistance donning and doffing all UE clothes while edge of bed  (Met)       Start:  10/16/23    Expected End:  10/30/23    Resolved:  10/19/23         Patient with complete lower body dressing with supervision level of assistance donning and doffing all LE clothes  with reacher while edge of bed  and standing (Met)       Start:  10/16/23    Expected End:  10/30/23    Resolved:  10/19/23         Patient will complete toileting including hygiene clothing management/hygiene with supervision level of assistance. (Met)       Start:  10/16/23    Expected End:  10/30/23    Resolved:  10/19/23            BALANCE       Patient will tolerate standing for 5-6 minutes to supervision level of assistance with least restrictive device in order to improve functional activity tolerance for ADL tasks. (Met)       Start:  10/16/23    Expected End:  10/30/23    Resolved:  10/19/23            MOBILITY       Patient will perform Functional mobility mod  Household distances/Community Distances with supervision level of assistance and least restrictive device in order to improve safety and functional mobility. (Met)       Start:  10/16/23    Expected End:  10/30/23    Resolved:  10/19/23

## 2023-10-19 NOTE — PROGRESS NOTES
"Xochitl Pichardo is a 57 y.o. female on day 5 of admission presenting with Colonic mass.    Subjective   Patient states she is feeling better.  States she did have some pain with eating yesterday.  Otherwise denies nausea/vomiting.       Objective     Physical Exam   Gen: NAD.  A&Ox3  HEENT: NC/AT.  Moist mucous membranes.  Neck: Normal range of motion.  CV: Regular rate.  Chest: Normal chest rise.  Normal respiratory effort.  Abdomen: Soft.  NT/ND.  Ostomy with appropriate gas and enteric output.  No rigidity or guarding.  Extremities: No edema.  Moving all extremities.      Last Recorded Vitals  Blood pressure 120/77, pulse 78, temperature 36.4 °C (97.5 °F), temperature source Temporal, resp. rate 18, height 1.7 m (5' 6.93\"), weight 68 kg (149 lb 14.6 oz), SpO2 94 %.  Intake/Output last 3 Shifts:  I/O last 3 completed shifts:  In: 550 (8.1 mL/kg) [P.O.:450; IV Piggyback:100]  Out: 400 (5.9 mL/kg) [Stool:400]  Weight: 68 kg         Assessment/Plan   Principal Problem:    Colonic mass  Active Problems:    Obstruction of colon (CMS/HCC)    Hypothyroidism  Patient is a 57-year-old female with obstructing colonic mass now POD #5 s/p diverting loop colostomy.      Plan  -Tolerating diet at this time, however still having some pain so would like to monitor  -Continue ambulation  -Patient at this time is working close to discharge  -Monitor vitals  -Discussed with Dr. Wendy Ramirez, PGY4  General Surgery  "

## 2023-10-19 NOTE — PROGRESS NOTES
" GENERAL SURGERY PROGRESS NOTE    Xochitl Pichardo   1965   [id number]     Xochitl Pichardo is a 57 y.o. female on day 5 of admission presenting with Colonic mass.      Subjective  Patient seen and assessed.  States she is eating a little bit more slowly.  States abdomen continues to be painful. Denies nausea     Review of Systems   Constitutional: Negative.    Respiratory: Negative.     Cardiovascular: Negative.    Gastrointestinal:  Positive for abdominal pain.       Objective    Last Recorded Vitals  Blood pressure 120/77, pulse 78, temperature 36.4 °C (97.5 °F), temperature source Temporal, resp. rate 18, height 1.7 m (5' 6.93\"), weight 68 kg (149 lb 14.6 oz), SpO2 94 %.    Intake/Output last 3 Shifts:  I/O last 3 completed shifts:  In: 550 (8.1 mL/kg) [P.O.:450; IV Piggyback:100]  Out: 400 (5.9 mL/kg) [Stool:400]  Weight: 68 kg     Physical Exam  Constitutional:       Appearance: Normal appearance.   HENT:      Head: Normocephalic and atraumatic.   Eyes:      Extraocular Movements: Extraocular movements intact.      Conjunctiva/sclera: Conjunctivae normal.   Abdominal:      Palpations: Abdomen is soft.   Musculoskeletal:         General: Normal range of motion.      Cervical back: Normal range of motion and neck supple.   Neurological:      Mental Status: She is alert.       Relevant Results  No results found for this or any previous visit (from the past 24 hour(s)).    CT chest abdomen pelvis w IV contrast    Result Date: 10/19/2023  Interpreted By:  Kevin Freeman, STUDY: CT CHEST ABDOMEN PELVIS W IV CONTRAST;  10/16/2023 4:29 pm   INDICATION: Signs/Symptoms:complete imaging workup 2/2 colon cancer   COMPARISON: 10/13/2023 reporting interval development of colonic obstruction with diffuse dilatation of the colon and transition point sigmoid colon at the previously described colonic mass   ACCESSION NUMBER(S): [id number]   ORDERING CLINICIAN: BETHANY RAMIREZ   TECHNIQUE: CT of the chest, abdomen, and pelvis " was performed. Contiguous axial images were obtained at  5 mm slice thickness through the chest, and at  3 mm through the abdomen and pelvis. Coronal and sagittal reconstructions at  3 mm slice thickness were performed.   FINDINGS: This case was submitted for my interpretation 10/19/2023   CHEST:   Cardiomegaly with 2 cm thyroid nodule of the isthmus. This finding is best evaluated with ultrasound.   Prominent pericardial reflection. No pericardial effusion. Subsegmental atelectasis of both lung bases with air bronchograms. Small bilateral pleural effusions right side greater than left.   No mediastinal or hilar adenopathy. No pulmonary nodules.   ABDOMEN PELVIS:   There is a small amount of ascites.   Free air under the right hemidiaphragm is noted the result of recent ostomy surgery. No liver lesions. Presumed splenic cysts or hemangiomas appearing similar to prior examination.   Kidneys are normal size and position. No stones or obstructive uropathy. No ureterolithiasis. No mass or lesion. Normal enhancement. Air in the urinary bladder is likely from a Liu catheter. Fluid is present lower pelvis.   Mass of the sigmoid colon axial image 161 again noted measuring up to 5 cm in length. This results in distension of the colon. There is mild distension of the small bowel loops of the mid abdomen measuring 3.4 cm.   Ostomy of the left median location appears normal without complicating features.   There is no retroperitoneal adenopathy. Small normal appearing lymph nodes are identified of the periaortic region. Miniscule subcutaneous air from recent surgery of the ventral abdominal wall.   There are no bone lesions. Arthritis with disc space height loss L5-S1.   There is a sacral cyst at L2.           CHEST 1.  No evidence of metastatic disease to the chest. Thyromegaly. Small bilateral pleural effusions right side greater than left with minimal compressive atelectasis.   ABDOMEN - PELVIS 1. Small amount of fluid in  the pericolic gutters and in the lower pelvis. Continued distension of the sigmoid colon with a 5 cm mass. Mild distension of small bowel loops. Free air from recent ostomy surgery. 2. No evidence of metastatic disease. 3. Lesions of the spleen presumably hemangiomas or cysts, unlikely sequestered metastatic disease.   Results personally discussed with the patient's clinician Dr. Nguyen at 9:05 a.m. on 10/19/2023.   MACRO: None   Signed by: Kevin Freeman 10/19/2023 9:11 AM Dictation workstation:   [id number]    CT abdomen pelvis wo IV contrast    Result Date: 10/13/2023  Interpreted By:  Jason Jama, STUDY: CT ABDOMEN PELVIS WO IV CONTRAST;  10/13/2023 8:02 pm   INDICATION: Signs/Symptoms:abd pain.   COMPARISON: 12/6/2022   ACCESSION NUMBER(S): [id number]   ORDERING CLINICIAN: KRISS STARR   TECHNIQUE: Contiguous axial images of the abdomen and pelvis were obtained without intravenous contrast.   Coronal and sagittal reformatted images were obtained from the axial images.   FINDINGS: There is limited evaluation of the lung bases. Trace right pleural effusion and basilar atelectasis.   Evaluation of the abdominal viscera is limited secondary to lack of intravenous contrast. Limited evaluation for liver mass on noncontrast examination. The gallbladder is not well assessed.   Limited evaluation the pancreas.   Stable cystic lesions in the spleen with 1.9 cm cystic lesion demonstrating mild internal septation.   The adrenal glands appear unremarkable.   No evidence of renal calculus or hydronephrosis. Evaluation of the kidneys is otherwise limited secondary to lack of intravenous contrast.   There is interval development of diffuse fluid dilatation of the colon with transition point in the sigmoid colon at site of previously described colonic mass.   Urinary bladder is underdistended and not well evaluated.   Limited evaluation of the uterus and adnexa. Interval increase in size of 5.5 cm x 3.4 cm cystic lesion  in the left posterior pelvis.   Multilevel degenerative change of the lumbar spine.       Interval development of colonic obstruction with diffuse dilatation of the colon and transition point in the sigmoid colon at site of previously described colonic mass concerning for colonic malignancy as described on the 12/6/2022 CT examination.   Interval increase in size of 5.5 cm x 3.4 center cystic lesion in the left posterior pelvis; follow-up nonemergent pelvic ultrasound is recommended for further evaluation.   MACRO: Jason Jama discussed the significance and urgency of this critical finding by telephone with  KRISS STARR on 10/13/2023 at 8:32 pm. (**-RCF-**) Findings:  See findings.   Signed by: Jason Jama 10/13/2023 8:36 PM Dictation workstation:   [id number]      Assessment and Plan  Principal Problem:    Colonic mass  Active Problems:    Obstruction of colon (CMS/HCC)    Hypothyroidism    57 y.o. female with obstructing colonic mass  - CT c/a/p w/ IV contrast revealed:   CHEST  1.  No evidence of metastatic disease to the chest. Thyromegaly.  Small bilateral pleural effusions right side greater than left with  minimal compressive atelectasis.      ABDOMEN - PELVIS  1. Small amount of fluid in the pericolic gutters and in the lower  pelvis. Continued distension of the sigmoid colon with a 5 cm mass.  Mild distension of small bowel loops. Free air from recent ostomy  surgery.  2. No evidence of metastatic disease.  3. Lesions of the spleen presumably hemangiomas or cysts, unlikely  sequestered metastatic disease.    - Continue diet  - Encouraged ambulation, IS  - No further intervention at this time  - Monitor vitals  - Discussed with Dr. Nguyen

## 2023-10-20 VITALS
DIASTOLIC BLOOD PRESSURE: 72 MMHG | HEART RATE: 82 BPM | OXYGEN SATURATION: 93 % | TEMPERATURE: 98.7 F | HEIGHT: 67 IN | RESPIRATION RATE: 16 BRPM | WEIGHT: 149.91 LBS | SYSTOLIC BLOOD PRESSURE: 128 MMHG | BODY MASS INDEX: 23.53 KG/M2

## 2023-10-20 LAB
ANION GAP SERPL CALC-SCNC: 14 MMOL/L (ref 10–20)
BUN SERPL-MCNC: 9 MG/DL (ref 6–23)
CALCIUM SERPL-MCNC: 8.3 MG/DL (ref 8.6–10.3)
CHLORIDE SERPL-SCNC: 101 MMOL/L (ref 98–107)
CO2 SERPL-SCNC: 25 MMOL/L (ref 21–32)
CREAT SERPL-MCNC: 0.65 MG/DL (ref 0.5–1.05)
GFR SERPL CREATININE-BSD FRML MDRD: >90 ML/MIN/1.73M*2
GLUCOSE SERPL-MCNC: 80 MG/DL (ref 74–99)
HOLD SPECIMEN: NORMAL
POTASSIUM SERPL-SCNC: 3 MMOL/L (ref 3.5–5.3)
SODIUM SERPL-SCNC: 137 MMOL/L (ref 136–145)

## 2023-10-20 PROCEDURE — C9113 INJ PANTOPRAZOLE SODIUM, VIA: HCPCS | Performed by: SURGERY

## 2023-10-20 PROCEDURE — 2500000001 HC RX 250 WO HCPCS SELF ADMINISTERED DRUGS (ALT 637 FOR MEDICARE OP): Performed by: SURGERY

## 2023-10-20 PROCEDURE — 2500000004 HC RX 250 GENERAL PHARMACY W/ HCPCS (ALT 636 FOR OP/ED): Performed by: SURGERY

## 2023-10-20 PROCEDURE — 80048 BASIC METABOLIC PNL TOTAL CA: CPT | Performed by: SURGERY

## 2023-10-20 PROCEDURE — 36415 COLL VENOUS BLD VENIPUNCTURE: CPT | Performed by: SURGERY

## 2023-10-20 RX ORDER — POLYETHYLENE GLYCOL 3350 17 G/17G
17 POWDER, FOR SOLUTION ORAL DAILY
Status: SHIPPED | OUTPATIENT
Start: 2023-10-20 | End: 2023-10-30

## 2023-10-20 RX ADMIN — CEFOXITIN SODIUM 2 G: 2 POWDER, FOR SOLUTION INTRAVENOUS at 00:25

## 2023-10-20 RX ADMIN — CEFOXITIN SODIUM 2 G: 2 POWDER, FOR SOLUTION INTRAVENOUS at 11:40

## 2023-10-20 RX ADMIN — SIMETHICONE 80 MG: 80 TABLET, CHEWABLE ORAL at 00:25

## 2023-10-20 RX ADMIN — SIMETHICONE 80 MG: 80 TABLET, CHEWABLE ORAL at 08:36

## 2023-10-20 RX ADMIN — PANTOPRAZOLE SODIUM 40 MG: 40 INJECTION, POWDER, FOR SOLUTION INTRAVENOUS at 08:22

## 2023-10-20 RX ADMIN — CEFOXITIN SODIUM 2 G: 2 POWDER, FOR SOLUTION INTRAVENOUS at 05:02

## 2023-10-20 ASSESSMENT — COGNITIVE AND FUNCTIONAL STATUS - GENERAL
DAILY ACTIVITIY SCORE: 24
MOBILITY SCORE: 24

## 2023-10-20 NOTE — PROGRESS NOTES
Physical Therapy                 Therapy Communication Note    Patient Name: Xochilt Pichardo  MRN: [id number]  Today's Date: 10/20/2023     Discipline: Physical Therapy    Missed Visit Reason:  patient up indecently still walking halls no safety concerns  spoke with evaluating therapist ok tod/c form PT goals met     Missed Time: Attempt    Comment:

## 2023-10-20 NOTE — DISCHARGE SUMMARY
Discharge Diagnosis  Colonic mass    Issues Requiring Follow-Up  Colostomy with obstructing colon mass    Test Results Pending At Discharge  Pending Labs       Order Current Status    Surgical Pathology Exam In process            Hospital Course  Pt is a 56yo F who presented with an obstructing sigmoid colon mass.  Patient was taken to the operating room for decompression/diversion.  Patient underwent diverting loop transverse colostomy and subsequently was admitted to the hospital for further observation and management.  Prior to discharge, patient was tolerating diet she was ambulatory and her pain was well controlled.  She was instructed to call her surgeon or come to the ED if having chest pain, trouble breathing, intractable nausea/vomiting, bleeding or discharge from her surgical sites or any other concerns.  She understood and was in agreement with the plans.    Discharge diet: Low fiber  Discharge activity: As tolerated.  No lifting more than 10 to 15 pounds for 4 weeks  Discharge follow-up: Dr. Nguyen to be seen in 2 weeks    Patient was safe for discharge and was discharged home in stable condition.    Pertinent Physical Exam At Time of Discharge  Physical Exam  Gen: NAD.  A&Ox3  HEENT: NC/AT.  Moist mucous membranes.  Neck: Normal range of motion.  CV: Regular rate.  Chest: Normal chest rise.  Normal respiratory effort.  Abdomen: Soft.  Ostomy in place is pink, patent and productive.  Mildly tender to palpation/appropriately.  Midline incision is clean/dry/intact with staples in place.  No rigidity or guarding.  Extremities: No edema.  Moving all extremities.      Home Medications     Medication List      You have not been prescribed any medications.       Outpatient Follow-Up  Pt to follow up with Dr. Nguyen in 2 weeks.    Horace Ramirez DO

## 2023-10-20 NOTE — PROGRESS NOTES
Spoke to patient regarding HHC. Patient will be staying with her sister in [place] upon discharge and is interested in HHC-[facility] only. HHC list given and patient will review.

## 2023-10-20 NOTE — NURSING NOTE
Pt and pt's spouse given discharge instructions and education, both verbalize understanding IV removed, cathtip intact, pt tolerated well. Pt denies further needs. Pt changed into own clothing, pt states she is leaving with all personal belongings now. Pt taken via wheelchair now to front entrance by RN. Pt to be driven to home by spouse now. Pt leaving in good condition, VS stable, ostomy drsg CDI, midline staples ANA.

## 2023-10-20 NOTE — PROGRESS NOTES
Occupational Therapy    Occupational Therapy Discharge Report    Patient Name: Xochitl Pichardo  MRN: [id number]  Today's Date: 10/20/2023          Objective   Interventions:     ADL Assessment: as of 10/19/23 per YANG  Grooming  Grooming Level of Assistance: Independent  Grooming Where Assessed:  (standing by tray table)  UE Bathing  UE Bathing Level of Assistance:  (pt. reports she is bathing self)  LE Bathing  LE Bathing Level of Assistance:  (pt. reports she is bathing self)  UE Dressing  UE Dressing Level of Assistance:  (pt. reports she is dressing self.)  LE Dressing  LE Dressing: Yes  Sock Level of Assistance: Independent  LE Dressing Comments: educated on cross leg tech. patient was sucessful  Toileting  Toileting Level of Assistance: Independent  Where Assessed: Toilet  Functional Standing Tolerance  Time: 5 mins  Activity: standing brushing teeth.  Functional Standing Tolerance Comments: no loss of balance  Toilet Transfers  Toilet Transfer From: Bed  Toilet Transfer Type: To  Toilet Transfer to: Standard toilet  Toilet Transfer Technique: Ambulating  Toilet Transfers: Independent, Modified independence  Toilet Transfers Comments: x2 pt. demonstrated (using grab bar for balance.)  Extremity Assessments:       Assessment/Plan      IP Plan  No Skilled OT: Independent with ADLs  OT Frequency:   OT Discharge Recommendations: No further acute OT (Pt DC from OT . All Goals have been met. Pt independent with ADLs)  Equipment Recommended upon Discharge:  N/A  OT Recommended Transfer Status: Independent  OT - OK to Discharge: Yes    Multi-Disciplinary Problems (from Occupational Therapy)      Active Problems       Not on file                    Patient is discharged to home once medically clear

## 2023-10-20 NOTE — PROGRESS NOTES
"Nutrition Assessment Note  Reason for Assessment  Reason for Assessment: Length of stay    History:  PMH includes Morgellons syndrome    HPI:  Patient presents with obstructing colonic mass, s/p exp laparotomy, diverting loop colostomy (10/14).      10/20:  Patient awake, alert at time of visit.  Patient reports appetite is improving post-op, denies N/V, does report some gas pain.  Tolerating meal intake, food brought from home.  Patient is using [employer] collagen peptide supplement from home.  Provided patient with colostomy nutrition therapy handout and reviewed important concepts.      Current Diet: Adult diet Regular  Average meal Intake during admission: %     Nutrition Related Findings: meds/labs reviewed.  . Pt reports gas. Peripheral Vascular (WDL): Within Defined Limits. Last BM Date: 10/16/23.   Wound Type:  Incision (nursing/wound notes provide further details)  has No Known Allergies.    Anthropometrics:  Height: 170 cm (5' 6.93\")  Weight: 68 kg (149 lb 14.6 oz)  BMI (Calculated): 23.53  IBW/kg (Dietitian Calculated): 61.3 kg  Percent of IBW: 110 %       Weight History / % Weight Change: Patient reports planned weight loss over the past year, changed eating habits.  Wt Readings from Last 10 Encounters:   10/16/23 68 kg (149 lb 14.6 oz)   12/22/22 82.1 kg (181 lb)   12/14/22 85 kg (187 lb 6.3 oz)   12/09/22 87.1 kg (192 lb 2 oz)   11/15/22 85.7 kg (189 lb)       Labs:  Results from last 7 days   Lab Units 10/20/23  0500 10/18/23  0351 10/16/23  1401   GLUCOSE mg/dL 80 86 93   SODIUM mmol/L 137 138 137   POTASSIUM mmol/L 3.0* 3.2* 3.3*   CHLORIDE mmol/L 101 104 102   CO2 mmol/L 25 25 25   BUN mg/dL 9 8 13   CREATININE mg/dL 0.65 0.51 0.49*   EGFR mL/min/1.73m*2 >90 >90 >90   CALCIUM mg/dL 8.3* 8.1* 8.5*   MAGNESIUM mg/dL  --  1.74 1.56*     No results found for: \"HGBA1C\"        Energy Needs:  Total Energy Estimated Needs (kCal):  (1700)  Total Estimated Energy Need per Day (kCal/kg):  (25, " CBW)    Total Protein Estimated Needs (g):  (70-80)  Total Protein Estimated Needs (g/kg):  (1-1.2, CBW)    Total Fluid Estimated Needs (mL):  (6352-6120)  Total Fluid Estimated Needs (mL/kg):  (25-30, CBW)       Nutrition Focused Physical Findings:  Subcutaneous Fat Loss  Orbital Fat Pads: Well nourshed (slightly bulging fat pads)  Buccal Fat Pads: Well nourished (full, rounded cheeks)    Muscle Wasting  Temporalis: Well nourished (well-defined muscle)  Pectoralis (Clavicular Region): Well nourished (clavicle not visible)  Interosseous: Well nourished (muscle bulges)    Diagnosis   Malnutrition Diagnosis  Patient has Malnutrition Diagnosis: No    Patient has Nutrition Diagnosis: No      Interventions/Recommendations   Individualized Nutrition Prescription Provided for : Continue regular diet      Monitoring and Evaluation   Will continue to monitor and evaluate: Food and Nutrition Intake, GI Symptoms, Skin, and Weight       Follow Up  Time Spent (min): 45 minutes  Last Date of Nutrition Visit: 10/20/23  Nutrition Follow-Up Needed?: Dietitian to reassess per policy  Follow up Comment: 10/25-10/27

## 2023-10-23 LAB
LABORATORY COMMENT REPORT: NORMAL
PATH REPORT.FINAL DX SPEC: NORMAL
PATH REPORT.GROSS SPEC: NORMAL
PATH REPORT.TOTAL CANCER: NORMAL

## 2023-10-24 DIAGNOSIS — K63.89 COLONIC MASS: Primary | ICD-10-CM

## 2023-10-24 PROBLEM — Z78.9 HEALTH CARE HOME, ACTIVE CARE COORDINATION: Status: ACTIVE | Noted: 2023-10-24

## 2023-10-24 NOTE — PROGRESS NOTES
10/24/23 0935 Accessible [facility] can accept patient. Attempted to call patient to notify her of this, No answer and unable to leave message.

## 2023-11-07 ENCOUNTER — LAB (OUTPATIENT)
Dept: LAB | Facility: LAB | Age: 58
End: 2023-11-07
Payer: COMMERCIAL

## 2023-11-07 DIAGNOSIS — D12.5 BENIGN NEOPLASM OF SIGMOID COLON: Primary | ICD-10-CM

## 2023-11-07 PROCEDURE — 36415 COLL VENOUS BLD VENIPUNCTURE: CPT

## 2023-11-07 PROCEDURE — 84134 ASSAY OF PREALBUMIN: CPT

## 2023-11-08 LAB — PREALB SERPL-MCNC: 23.9 MG/DL (ref 18–40)

## 2023-11-12 NOTE — ADDENDUM NOTE
Addendum  created 11/12/23 1147 by Eren Baltazar MD    Clinical Note Signed       ALL (acute lymphoblastic leukemia)

## 2023-11-28 ENCOUNTER — ANESTHESIA EVENT (OUTPATIENT)
Dept: OPERATING ROOM | Facility: HOSPITAL | Age: 58
DRG: 331 | End: 2023-11-28
Payer: COMMERCIAL

## 2023-12-05 ENCOUNTER — PRE-ADMISSION TESTING (OUTPATIENT)
Dept: PREADMISSION TESTING | Facility: HOSPITAL | Age: 58
End: 2023-12-05
Payer: COMMERCIAL

## 2023-12-05 VITALS
TEMPERATURE: 97.6 F | SYSTOLIC BLOOD PRESSURE: 139 MMHG | RESPIRATION RATE: 20 BRPM | HEART RATE: 81 BPM | WEIGHT: 155.7 LBS | BODY MASS INDEX: 24.44 KG/M2 | HEIGHT: 67 IN | DIASTOLIC BLOOD PRESSURE: 87 MMHG | OXYGEN SATURATION: 98 %

## 2023-12-05 DIAGNOSIS — K56.609 LARGE BOWEL OBSTRUCTION (MULTI): ICD-10-CM

## 2023-12-05 DIAGNOSIS — K63.89 COLONIC MASS: ICD-10-CM

## 2023-12-05 DIAGNOSIS — K56.609: Primary | ICD-10-CM

## 2023-12-05 LAB
ALBUMIN SERPL BCP-MCNC: 4.3 G/DL (ref 3.4–5)
ALP SERPL-CCNC: 58 U/L (ref 33–110)
ALT SERPL W P-5'-P-CCNC: 11 U/L (ref 7–45)
AMYLASE SERPL-CCNC: 64 U/L (ref 29–103)
ANION GAP SERPL CALC-SCNC: 12 MMOL/L (ref 10–20)
APPEARANCE UR: CLEAR
AST SERPL W P-5'-P-CCNC: 13 U/L (ref 9–39)
BILIRUB SERPL-MCNC: 0.6 MG/DL (ref 0–1.2)
BILIRUB UR STRIP.AUTO-MCNC: NEGATIVE MG/DL
BUN SERPL-MCNC: 23 MG/DL (ref 6–23)
CALCIUM SERPL-MCNC: 9.8 MG/DL (ref 8.6–10.3)
CHLORIDE SERPL-SCNC: 103 MMOL/L (ref 98–107)
CO2 SERPL-SCNC: 26 MMOL/L (ref 21–32)
COLOR UR: YELLOW
CREAT SERPL-MCNC: 0.75 MG/DL (ref 0.5–1.05)
ERYTHROCYTE [DISTWIDTH] IN BLOOD BY AUTOMATED COUNT: 13.6 % (ref 11.5–14.5)
GFR SERPL CREATININE-BSD FRML MDRD: >90 ML/MIN/1.73M*2
GLUCOSE SERPL-MCNC: 108 MG/DL (ref 74–99)
GLUCOSE UR STRIP.AUTO-MCNC: NEGATIVE MG/DL
HCT VFR BLD AUTO: 37.8 % (ref 36–46)
HGB BLD-MCNC: 12.5 G/DL (ref 12–16)
HOLD SPECIMEN: NORMAL
KETONES UR STRIP.AUTO-MCNC: NEGATIVE MG/DL
LEUKOCYTE ESTERASE UR QL STRIP.AUTO: NEGATIVE
LIPASE SERPL-CCNC: 80 U/L (ref 9–82)
MCH RBC QN AUTO: 29.6 PG (ref 26–34)
MCHC RBC AUTO-ENTMCNC: 33.1 G/DL (ref 32–36)
MCV RBC AUTO: 89 FL (ref 80–100)
NITRITE UR QL STRIP.AUTO: NEGATIVE
NRBC BLD-RTO: 0 /100 WBCS (ref 0–0)
PH UR STRIP.AUTO: 5 [PH]
PLATELET # BLD AUTO: 248 X10*3/UL (ref 150–450)
POTASSIUM SERPL-SCNC: 4.1 MMOL/L (ref 3.5–5.3)
PROT SERPL-MCNC: 7.4 G/DL (ref 6.4–8.2)
PROT UR STRIP.AUTO-MCNC: NEGATIVE MG/DL
RBC # BLD AUTO: 4.23 X10*6/UL (ref 4–5.2)
RBC # UR STRIP.AUTO: NEGATIVE /UL
SODIUM SERPL-SCNC: 137 MMOL/L (ref 136–145)
SP GR UR STRIP.AUTO: 1.01
UROBILINOGEN UR STRIP.AUTO-MCNC: <2 MG/DL
WBC # BLD AUTO: 8.5 X10*3/UL (ref 4.4–11.3)

## 2023-12-05 PROCEDURE — 36415 COLL VENOUS BLD VENIPUNCTURE: CPT

## 2023-12-05 PROCEDURE — 93010 ELECTROCARDIOGRAM REPORT: CPT | Performed by: INTERNAL MEDICINE

## 2023-12-05 PROCEDURE — 85027 COMPLETE CBC AUTOMATED: CPT

## 2023-12-05 PROCEDURE — 82150 ASSAY OF AMYLASE: CPT

## 2023-12-05 PROCEDURE — 99204 OFFICE O/P NEW MOD 45 MIN: CPT | Performed by: CLINICAL NURSE SPECIALIST

## 2023-12-05 PROCEDURE — 81003 URINALYSIS AUTO W/O SCOPE: CPT

## 2023-12-05 PROCEDURE — 83690 ASSAY OF LIPASE: CPT

## 2023-12-05 PROCEDURE — 93005 ELECTROCARDIOGRAM TRACING: CPT

## 2023-12-05 PROCEDURE — 80053 COMPREHEN METABOLIC PANEL: CPT

## 2023-12-05 RX ORDER — BISMUTH SUBSALICYLATE 262 MG
1 TABLET,CHEWABLE ORAL DAILY
COMMUNITY

## 2023-12-05 ASSESSMENT — LIFESTYLE VARIABLES: SMOKING_STATUS: NONSMOKER

## 2023-12-05 ASSESSMENT — CHADS2 SCORE
AGE GREATER THAN OR EQUAL TO 75: NO
PRIOR STROKE OR TIA OR THROMBOEMBOLISM: NO
CHADS2 SCORE: 0
CHF: NO
HYPERTENSION: NO
DIABETES: NO

## 2023-12-05 ASSESSMENT — ENCOUNTER SYMPTOMS
ALLERGIC/IMMUNOLOGIC NEGATIVE: 1
MUSCULOSKELETAL NEGATIVE: 1
NEUROLOGICAL NEGATIVE: 1
RESPIRATORY NEGATIVE: 1
GASTROINTESTINAL NEGATIVE: 1
ENDOCRINE NEGATIVE: 1
EYES NEGATIVE: 1
CARDIOVASCULAR NEGATIVE: 1
HEMATOLOGIC/LYMPHATIC NEGATIVE: 1
CONSTITUTIONAL NEGATIVE: 1
PSYCHIATRIC NEGATIVE: 1

## 2023-12-05 ASSESSMENT — DUKE ACTIVITY SCORE INDEX (DASI)
CAN YOU WALK INDOORS, SUCH AS AROUND YOUR HOUSE: YES
CAN YOU TAKE CARE OF YOURSELF (EAT, DRESS, BATHE, OR USE TOILET): YES
CAN YOU DO LIGHT WORK AROUND THE HOUSE LIKE DUSTING OR WASHING DISHES: YES
CAN YOU PARTICIPATE IN STRENOUS SPORTS LIKE SWIMMING, SINGLES TENNIS, FOOTBALL, BASKETBALL, OR SKIING: NO
CAN YOU PARTICIPATE IN MODERATE RECREATIONAL ACTIVITIES LIKE GOLF, BOWLING, DANCING, DOUBLES TENNIS OR THROWING A BASEBALL OR FOOTBALL: NO
CAN YOU DO MODERATE WORK AROUND THE HOUSE LIKE VACUUMING, SWEEPING FLOORS OR CARRYING GROCERIES: YES
CAN YOU WALK A BLOCK OR TWO ON LEVEL GROUND: YES
CAN YOU DO YARD WORK LIKE RAKING LEAVES, WEEDING OR PUSHING A MOWER: YES
CAN YOU CLIMB A FLIGHT OF STAIRS OR WALK UP A HILL: YES
CAN YOU DO HEAVY WORK AROUND THE HOUSE LIKE SCRUBBING FLOORS OR LIFTING AND MOVING HEAVY FURNITURE: YES
CAN YOU RUN A SHORT DISTANCE: NO

## 2023-12-05 NOTE — H&P
History Of Present Illness  Xochitl Pichardo is a 57 y.o. very pleasant female presenting with Hx of obstructing abdominal/colon mass, found on colonoscopy in 2022. Colon resection and ostomy placement in October 2023. Scheduled for sigmoid colectomy under general anesthesia per Dr. Nguyen on 12/7/23.       Past Medical History  Past Medical History:   Diagnosis Date    Hypothyroidism     Other specified disorders of the skin and subcutaneous tissue 04/13/2019    Morgellons syndrome    Other specified disorders of the skin and subcutaneous tissue 04/23/2019    Morgellons syndrome       Surgical History  Past Surgical History:   Procedure Laterality Date    COLONOSCOPY      OTHER SURGICAL HISTORY  10/14/2023    loop ostomy        Social History  She reports that she has never smoked. She has never used smokeless tobacco. She reports that she does not drink alcohol and does not use drugs.    Family History  Family History   Problem Relation Name Age of Onset    Parkinsonism Mother      Heart disease Father      Heart disease Brother      Heart disease Paternal Grandfather          Allergies  Patient has no known allergies.    Review of Systems   Constitutional: Negative.    HENT: Negative.     Eyes: Negative.    Respiratory: Negative.     Cardiovascular: Negative.    Gastrointestinal: Negative.         Ostomy performing well. Patient states skin is intact and clear around stoma site.    Endocrine: Negative.    Genitourinary: Negative.    Musculoskeletal: Negative.    Skin: Negative.    Allergic/Immunologic: Negative.    Neurological: Negative.    Hematological: Negative.    Psychiatric/Behavioral: Negative.     All other systems reviewed and are negative.       Physical Exam  Vitals and nursing note reviewed.   Constitutional:       Appearance: Normal appearance.   HENT:      Head: Normocephalic.      Nose: Nose normal.      Mouth/Throat:      Comments: Mallampati 3, finger breadth 3  Full neck ROM  Right upper broken  "tooth, otherwise normal dentition.   Eyes:      Pupils: Pupils are equal, round, and reactive to light.   Cardiovascular:      Rate and Rhythm: Normal rate and regular rhythm.      Heart sounds: Normal heart sounds, S1 normal and S2 normal.   Pulmonary:      Effort: Pulmonary effort is normal.      Breath sounds: Normal breath sounds and air entry.      Comments: Lungs clear throughout all fields.   Abdominal:      General: Bowel sounds are normal.      Palpations: Abdomen is soft.      Comments: Bowel sounds active x4 quads, stoma site is beefy red and skin around site is intact. Small brown liquid stool in ostomy bag.    Musculoskeletal:         General: Normal range of motion.      Cervical back: Normal range of motion.      Right lower leg: No edema.      Left lower leg: No edema.   Skin:     General: Skin is warm and dry.   Neurological:      General: No focal deficit present.      Mental Status: She is alert and oriented to person, place, and time.   Psychiatric:         Mood and Affect: Mood normal.         Behavior: Behavior normal.         Thought Content: Thought content normal.         Judgment: Judgment normal.          Last Recorded Vitals  Blood pressure 139/87, pulse 81, temperature 36.4 °C (97.6 °F), temperature source Temporal, resp. rate 20, height 1.702 m (5' 7\"), weight 70.6 kg (155 lb 11.2 oz), SpO2 98 %.    Relevant Results    Current Outpatient Medications:     multivitamin tablet, Take 1 tablet by mouth once daily., Disp: , Rfl:      Results for orders placed or performed in visit on 12/05/23 (from the past 24 hour(s))   Comprehensive Metabolic Panel   Result Value Ref Range    Glucose 108 (H) 74 - 99 mg/dL    Sodium 137 136 - 145 mmol/L    Potassium 4.1 3.5 - 5.3 mmol/L    Chloride 103 98 - 107 mmol/L    Bicarbonate 26 21 - 32 mmol/L    Anion Gap 12 10 - 20 mmol/L    Urea Nitrogen 23 6 - 23 mg/dL    Creatinine 0.75 0.50 - 1.05 mg/dL    eGFR >90 >60 mL/min/1.73m*2    Calcium 9.8 8.6 - 10.3 " mg/dL    Albumin 4.3 3.4 - 5.0 g/dL    Alkaline Phosphatase 58 33 - 110 U/L    Total Protein 7.4 6.4 - 8.2 g/dL    AST 13 9 - 39 U/L    Bilirubin, Total 0.6 0.0 - 1.2 mg/dL    ALT 11 7 - 45 U/L   Lipase   Result Value Ref Range    Lipase 80 9 - 82 U/L   Amylase   Result Value Ref Range    Amylase 64 29 - 103 U/L   CBC   Result Value Ref Range    WBC 8.5 4.4 - 11.3 x10*3/uL    nRBC 0.0 0.0 - 0.0 /100 WBCs    RBC 4.23 4.00 - 5.20 x10*6/uL    Hemoglobin 12.5 12.0 - 16.0 g/dL    Hematocrit 37.8 36.0 - 46.0 %    MCV 89 80 - 100 fL    MCH 29.6 26.0 - 34.0 pg    MCHC 33.1 32.0 - 36.0 g/dL    RDW 13.6 11.5 - 14.5 %    Platelets 248 150 - 450 x10*3/uL             Assessment/Plan   Problem List Items Addressed This Visit             ICD-10-CM    Obstruction of colon (CMS/HCC) - Primary K56.609    Relevant Orders    CBC (Completed)    Amylase (Completed)    Lipase (Completed)    ECG 12 Lead    Urinalysis with Reflex Microscopic and Culture    Comprehensive Metabolic Panel (Completed)    Colonic mass K63.89    Relevant Orders    CBC (Completed)    Amylase (Completed)    Lipase (Completed)    ECG 12 Lead    Urinalysis with Reflex Microscopic and Culture    Comprehensive Metabolic Panel (Completed)     Other Visit Diagnoses         Codes    Large bowel obstruction (CMS/HCC)     K56.609          Hx of obstructing abdominal/colon mass, found on colonoscopy in 2022. Colon resection and ostomy placement in October 2023. Scheduled for sigmoid colectomy under general anesthesia per Dr. Nguyen on 12/7/23.   Labs ordered, results pending   EKG shows SR today.  Patient states she's doing well and taking her protein supplement for the ERAS protocol.   All surgery instructions reviewed by RN with patient. Patient verbalized understanding. All questions answered.   Plan is to eventually reverse ostomy after she is healed from the sigmoid colectomy. Will follow with surgeon post operatively.        I spent 30 minutes in the professional and  overall care of this patient.      Ericka Crowder, APRN-CNS

## 2023-12-05 NOTE — CPM/PAT H&P
CPM/PAT Evaluation       Name: Xochitl Pichardo (Xochitl Pichardo)  /Age: 1965/57 y.o.     In-Person       Chief Complaint: Hx of obstructing abdominal/colon mass, colonoscopy in . Colon resection and ostomy placement in 2023. Scheduled for sigmoid colectomy under general anesthesia per Dr. Nguyen on 23.       Past Medical History:   Diagnosis Date    Hypothyroidism     Other specified disorders of the skin and subcutaneous tissue 2019    Morgellons syndrome    Other specified disorders of the skin and subcutaneous tissue 2019    Morgellons syndrome       Past Surgical History:   Procedure Laterality Date    COLONOSCOPY      OTHER SURGICAL HISTORY  10/14/2023    loop ostomy       Patient  reports being sexually active.    Family History   Problem Relation Name Age of Onset    Parkinsonism Mother      Heart disease Father      Heart disease Brother      Heart disease Paternal Grandfather         No Known Allergies    Prior to Admission medications    Medication Sig Start Date End Date Taking? Authorizing Provider   multivitamin tablet Take 1 tablet by mouth once daily.   Yes Historical Provider, MD MCCALL AIRWAY:   Airway:     Mallampati::  III    Neck ROM::  Full  normal        Visit Vitals  /87   Pulse 81   Temp 36.4 °C (97.6 °F) (Temporal)   Resp 20       DASI Risk Score    No data to display       Caprini DVT Assessment      Flowsheet Row Most Recent Value   DVT Score 8   Current Status Major surgery planned, lasting over 3 hours   History Prior major surgery   Age 40-59 years   BMI 30 or less          Modified Frailty Index    No data to display       CHADS2 Stroke Risk  Current as of 9 minutes ago        N/A 3 - 100%: High Risk   2 - 3%: Medium Risk   0 - 2%: Low Risk     Last Change: N/A          This score determines the patient's risk of having a stroke if the patient has atrial fibrillation.        This score is not applicable to this patient. Components are  not calculated.          Revised Cardiac Risk Index      Flowsheet Row Most Recent Value   Revised Cardiac Risk Calculator 1          Apfel Simplified Score      Flowsheet Row Most Recent Value   Apfel Simplified Score Calculator 3          Risk Analysis Index Results This Encounter    No data found in the last 1 encounters.       Stop Bang Score      Flowsheet Row Most Recent Value   Do you snore loudly? 1   Do you often feel tired or fatigued after your sleep? 0   Has anyone ever observed you stop breathing in your sleep? 0   Do you have or are you being treated for high blood pressure? 0   Recent BMI (Calculated) 24.4   Is BMI greater than 35 kg/m2? 0=No   Age older than 50 years old? 1=Yes   Is your neck circumference greater than 17 inches (Male) or 16 inches (Female)? 0   Gender - Male 0=No   STOP-BANG Total Score 2            Assessment and Plan:     Other:  Hx of obstructing abdominal/colon mass, found on colonoscopy in 2022. Colon resection and ostomy placement in October 2023. Scheduled for sigmoid colectomy under general anesthesia per Dr. Nguyen on 12/7/23. See H&P. Plan is to follow up with surgeon post operatively.

## 2023-12-05 NOTE — PREPROCEDURE INSTRUCTIONS
Medication List            Accurate as of December 5, 2023  3:19 PM. Always use your most recent med list.                multivitamin tablet                              NPO Instructions:    Do not eat any food after midnight the night before your surgery/procedure.    Additional Instructions:     ERAS patients, drink your Carbohydrate drink TWO hours before surgery  Wear  comfortable loose fitting clothing  Do not use moisturizers, creams, lotions or perfume  All jewelry and valuables should be left at home    Stop multi vitamin today  Clear liquid diet tomorrow 12/6  No prep per dr simon  May continue protein powder thru 12/6 per dr simon

## 2023-12-06 LAB
ATRIAL RATE: 75 BPM
P AXIS: 42 DEGREES
PR INTERVAL: 147 MS
Q ONSET: 253 MS
QRS COUNT: 12 BEATS
QRS DURATION: 96 MS
QT INTERVAL: 416 MS
QTC CALCULATION(BAZETT): 465 MS
QTC FREDERICIA: 447 MS
R AXIS: 23 DEGREES
T AXIS: 32 DEGREES
T OFFSET: 461 MS
VENTRICULAR RATE: 75 BPM

## 2023-12-07 ENCOUNTER — HOSPITAL ENCOUNTER (INPATIENT)
Facility: HOSPITAL | Age: 58
LOS: 4 days | Discharge: HOME | DRG: 331 | End: 2023-12-11
Attending: SURGERY | Admitting: SURGERY
Payer: COMMERCIAL

## 2023-12-07 ENCOUNTER — ANESTHESIA (OUTPATIENT)
Dept: OPERATING ROOM | Facility: HOSPITAL | Age: 58
DRG: 331 | End: 2023-12-07
Payer: COMMERCIAL

## 2023-12-07 DIAGNOSIS — K63.9 COLON ABNORMALITY: ICD-10-CM

## 2023-12-07 DIAGNOSIS — C18.9 COLON CANCER (MULTI): Primary | ICD-10-CM

## 2023-12-07 PROCEDURE — 3700000002 HC GENERAL ANESTHESIA TIME - EACH INCREMENTAL 1 MINUTE: Performed by: SURGERY

## 2023-12-07 PROCEDURE — 3700000001 HC GENERAL ANESTHESIA TIME - INITIAL BASE CHARGE: Performed by: SURGERY

## 2023-12-07 PROCEDURE — 2500000005 HC RX 250 GENERAL PHARMACY W/O HCPCS: Performed by: NURSE ANESTHETIST, CERTIFIED REGISTERED

## 2023-12-07 PROCEDURE — 3600000003 HC OR TIME - INITIAL BASE CHARGE - PROCEDURE LEVEL THREE: Performed by: SURGERY

## 2023-12-07 PROCEDURE — 3600000008 HC OR TIME - EACH INCREMENTAL 1 MINUTE - PROCEDURE LEVEL THREE: Performed by: SURGERY

## 2023-12-07 PROCEDURE — 7100000002 HC RECOVERY ROOM TIME - EACH INCREMENTAL 1 MINUTE: Performed by: SURGERY

## 2023-12-07 PROCEDURE — 2720000007 HC OR 272 NO HCPCS: Performed by: SURGERY

## 2023-12-07 PROCEDURE — 7100000001 HC RECOVERY ROOM TIME - INITIAL BASE CHARGE: Performed by: SURGERY

## 2023-12-07 PROCEDURE — 0DTN0ZZ RESECTION OF SIGMOID COLON, OPEN APPROACH: ICD-10-PCS | Performed by: SURGERY

## 2023-12-07 PROCEDURE — 1100000001 HC PRIVATE ROOM DAILY

## 2023-12-07 PROCEDURE — 88309 TISSUE EXAM BY PATHOLOGIST: CPT | Performed by: STUDENT IN AN ORGANIZED HEALTH CARE EDUCATION/TRAINING PROGRAM

## 2023-12-07 PROCEDURE — 2500000004 HC RX 250 GENERAL PHARMACY W/ HCPCS (ALT 636 FOR OP/ED): Performed by: NURSE ANESTHETIST, CERTIFIED REGISTERED

## 2023-12-07 PROCEDURE — 2500000004 HC RX 250 GENERAL PHARMACY W/ HCPCS (ALT 636 FOR OP/ED): Performed by: SURGERY

## 2023-12-07 PROCEDURE — 2500000004 HC RX 250 GENERAL PHARMACY W/ HCPCS (ALT 636 FOR OP/ED): Performed by: ANESTHESIOLOGY

## 2023-12-07 PROCEDURE — 88309 TISSUE EXAM BY PATHOLOGIST: CPT | Mod: TC,SUR,PORLAB | Performed by: SURGERY

## 2023-12-07 RX ORDER — LABETALOL HYDROCHLORIDE 5 MG/ML
5 INJECTION, SOLUTION INTRAVENOUS ONCE AS NEEDED
Status: DISCONTINUED | OUTPATIENT
Start: 2023-12-07 | End: 2023-12-07 | Stop reason: HOSPADM

## 2023-12-07 RX ORDER — GLYCOPYRROLATE 0.2 MG/ML
INJECTION INTRAMUSCULAR; INTRAVENOUS AS NEEDED
Status: DISCONTINUED | OUTPATIENT
Start: 2023-12-07 | End: 2023-12-07

## 2023-12-07 RX ORDER — BUPIVACAINE HYDROCHLORIDE 2.5 MG/ML
INJECTION, SOLUTION EPIDURAL; INFILTRATION; INTRACAUDAL AS NEEDED
Status: DISCONTINUED | OUTPATIENT
Start: 2023-12-07 | End: 2023-12-07

## 2023-12-07 RX ORDER — IBUPROFEN 400 MG/1
400 TABLET ORAL EVERY 8 HOURS PRN
Status: DISCONTINUED | OUTPATIENT
Start: 2023-12-07 | End: 2023-12-10

## 2023-12-07 RX ORDER — ALBUTEROL SULFATE 0.83 MG/ML
2.5 SOLUTION RESPIRATORY (INHALATION) ONCE AS NEEDED
Status: DISCONTINUED | OUTPATIENT
Start: 2023-12-07 | End: 2023-12-07 | Stop reason: HOSPADM

## 2023-12-07 RX ORDER — ACETAMINOPHEN 160 MG/5ML
650 SOLUTION ORAL EVERY 6 HOURS
Status: DISCONTINUED | OUTPATIENT
Start: 2023-12-07 | End: 2023-12-11 | Stop reason: HOSPADM

## 2023-12-07 RX ORDER — FAMOTIDINE 10 MG/ML
20 INJECTION INTRAVENOUS ONCE
Status: COMPLETED | OUTPATIENT
Start: 2023-12-07 | End: 2023-12-07

## 2023-12-07 RX ORDER — DEXMEDETOMIDINE HYDROCHLORIDE 100 UG/ML
INJECTION, SOLUTION INTRAVENOUS AS NEEDED
Status: DISCONTINUED | OUTPATIENT
Start: 2023-12-07 | End: 2023-12-07

## 2023-12-07 RX ORDER — ENOXAPARIN SODIUM 100 MG/ML
40 INJECTION SUBCUTANEOUS EVERY 24 HOURS
Status: DISCONTINUED | OUTPATIENT
Start: 2023-12-08 | End: 2023-12-11 | Stop reason: HOSPADM

## 2023-12-07 RX ORDER — ONDANSETRON HYDROCHLORIDE 2 MG/ML
4 INJECTION, SOLUTION INTRAVENOUS ONCE AS NEEDED
Status: DISCONTINUED | OUTPATIENT
Start: 2023-12-07 | End: 2023-12-07 | Stop reason: HOSPADM

## 2023-12-07 RX ORDER — OXYCODONE AND ACETAMINOPHEN 5; 325 MG/1; MG/1
1 TABLET ORAL EVERY 4 HOURS PRN
Status: DISCONTINUED | OUTPATIENT
Start: 2023-12-07 | End: 2023-12-07 | Stop reason: HOSPADM

## 2023-12-07 RX ORDER — DEXAMETHASONE SODIUM PHOSPHATE 4 MG/ML
INJECTION, SOLUTION INTRA-ARTICULAR; INTRALESIONAL; INTRAMUSCULAR; INTRAVENOUS; SOFT TISSUE AS NEEDED
Status: DISCONTINUED | OUTPATIENT
Start: 2023-12-07 | End: 2023-12-07

## 2023-12-07 RX ORDER — ACETAMINOPHEN 650 MG/1
650 SUPPOSITORY RECTAL EVERY 6 HOURS
Status: DISCONTINUED | OUTPATIENT
Start: 2023-12-07 | End: 2023-12-11 | Stop reason: HOSPADM

## 2023-12-07 RX ORDER — PROPOFOL 10 MG/ML
INJECTION, EMULSION INTRAVENOUS AS NEEDED
Status: DISCONTINUED | OUTPATIENT
Start: 2023-12-07 | End: 2023-12-07

## 2023-12-07 RX ORDER — MORPHINE SULFATE 2 MG/ML
4 INJECTION, SOLUTION INTRAMUSCULAR; INTRAVENOUS EVERY 4 HOURS PRN
Status: DISCONTINUED | OUTPATIENT
Start: 2023-12-07 | End: 2023-12-08

## 2023-12-07 RX ORDER — HYDRALAZINE HYDROCHLORIDE 20 MG/ML
5 INJECTION INTRAMUSCULAR; INTRAVENOUS EVERY 30 MIN PRN
Status: DISCONTINUED | OUTPATIENT
Start: 2023-12-07 | End: 2023-12-07 | Stop reason: HOSPADM

## 2023-12-07 RX ORDER — PANTOPRAZOLE SODIUM 40 MG/1
40 TABLET, DELAYED RELEASE ORAL
Status: DISCONTINUED | OUTPATIENT
Start: 2023-12-08 | End: 2023-12-11 | Stop reason: HOSPADM

## 2023-12-07 RX ORDER — DIPHENHYDRAMINE HYDROCHLORIDE 50 MG/ML
12.5 INJECTION INTRAMUSCULAR; INTRAVENOUS ONCE AS NEEDED
Status: DISCONTINUED | OUTPATIENT
Start: 2023-12-07 | End: 2023-12-07 | Stop reason: HOSPADM

## 2023-12-07 RX ORDER — NALOXONE HYDROCHLORIDE 0.4 MG/ML
0.2 INJECTION, SOLUTION INTRAMUSCULAR; INTRAVENOUS; SUBCUTANEOUS EVERY 5 MIN PRN
Status: DISCONTINUED | OUTPATIENT
Start: 2023-12-07 | End: 2023-12-11 | Stop reason: HOSPADM

## 2023-12-07 RX ORDER — LIDOCAINE HCL/PF 100 MG/5ML
SYRINGE (ML) INTRAVENOUS AS NEEDED
Status: DISCONTINUED | OUTPATIENT
Start: 2023-12-07 | End: 2023-12-07

## 2023-12-07 RX ORDER — ROCURONIUM BROMIDE 10 MG/ML
INJECTION, SOLUTION INTRAVENOUS AS NEEDED
Status: DISCONTINUED | OUTPATIENT
Start: 2023-12-07 | End: 2023-12-07

## 2023-12-07 RX ORDER — PHENYLEPHRINE HYDROCHLORIDE 10 MG/ML
INJECTION INTRAVENOUS AS NEEDED
Status: DISCONTINUED | OUTPATIENT
Start: 2023-12-07 | End: 2023-12-07

## 2023-12-07 RX ORDER — SODIUM CHLORIDE, SODIUM LACTATE, POTASSIUM CHLORIDE, CALCIUM CHLORIDE 600; 310; 30; 20 MG/100ML; MG/100ML; MG/100ML; MG/100ML
100 INJECTION, SOLUTION INTRAVENOUS CONTINUOUS
Status: DISCONTINUED | OUTPATIENT
Start: 2023-12-07 | End: 2023-12-07

## 2023-12-07 RX ORDER — LIDOCAINE HYDROCHLORIDE 10 MG/ML
0.1 INJECTION, SOLUTION EPIDURAL; INFILTRATION; INTRACAUDAL; PERINEURAL ONCE
Status: DISCONTINUED | OUTPATIENT
Start: 2023-12-07 | End: 2023-12-07 | Stop reason: HOSPADM

## 2023-12-07 RX ORDER — LIDOCAINE HYDROCHLORIDE 20 MG/ML
INJECTION, SOLUTION EPIDURAL; INFILTRATION; INTRACAUDAL; PERINEURAL AS NEEDED
Status: DISCONTINUED | OUTPATIENT
Start: 2023-12-07 | End: 2023-12-07

## 2023-12-07 RX ORDER — FENTANYL CITRATE 50 UG/ML
INJECTION, SOLUTION INTRAMUSCULAR; INTRAVENOUS AS NEEDED
Status: DISCONTINUED | OUTPATIENT
Start: 2023-12-07 | End: 2023-12-07

## 2023-12-07 RX ORDER — MIDAZOLAM HYDROCHLORIDE 1 MG/ML
INJECTION, SOLUTION INTRAMUSCULAR; INTRAVENOUS AS NEEDED
Status: DISCONTINUED | OUTPATIENT
Start: 2023-12-07 | End: 2023-12-07

## 2023-12-07 RX ORDER — DEXTROSE, SODIUM CHLORIDE, SODIUM LACTATE, POTASSIUM CHLORIDE, AND CALCIUM CHLORIDE 5; .6; .31; .03; .02 G/100ML; G/100ML; G/100ML; G/100ML; G/100ML
100 INJECTION, SOLUTION INTRAVENOUS CONTINUOUS
Status: SHIPPED | OUTPATIENT
Start: 2023-12-07

## 2023-12-07 RX ORDER — ONDANSETRON HYDROCHLORIDE 2 MG/ML
INJECTION, SOLUTION INTRAVENOUS AS NEEDED
Status: DISCONTINUED | OUTPATIENT
Start: 2023-12-07 | End: 2023-12-07

## 2023-12-07 RX ORDER — DROPERIDOL 2.5 MG/ML
0.62 INJECTION, SOLUTION INTRAMUSCULAR; INTRAVENOUS ONCE AS NEEDED
Status: DISCONTINUED | OUTPATIENT
Start: 2023-12-07 | End: 2023-12-07 | Stop reason: HOSPADM

## 2023-12-07 RX ORDER — DEXTROSE, SODIUM CHLORIDE, SODIUM LACTATE, POTASSIUM CHLORIDE, AND CALCIUM CHLORIDE 5; .6; .31; .03; .02 G/100ML; G/100ML; G/100ML; G/100ML; G/100ML
50 INJECTION, SOLUTION INTRAVENOUS CONTINUOUS
Status: DISCONTINUED | OUTPATIENT
Start: 2023-12-07 | End: 2023-12-11

## 2023-12-07 RX ORDER — ACETAMINOPHEN 325 MG/1
650 TABLET ORAL EVERY 6 HOURS
Status: DISCONTINUED | OUTPATIENT
Start: 2023-12-07 | End: 2023-12-11 | Stop reason: HOSPADM

## 2023-12-07 RX ORDER — PANTOPRAZOLE SODIUM 40 MG/10ML
40 INJECTION, POWDER, LYOPHILIZED, FOR SOLUTION INTRAVENOUS
Status: DISCONTINUED | OUTPATIENT
Start: 2023-12-08 | End: 2023-12-11 | Stop reason: HOSPADM

## 2023-12-07 RX ORDER — MORPHINE SULFATE 2 MG/ML
2 INJECTION, SOLUTION INTRAMUSCULAR; INTRAVENOUS EVERY 5 MIN PRN
Status: DISCONTINUED | OUTPATIENT
Start: 2023-12-07 | End: 2023-12-07 | Stop reason: HOSPADM

## 2023-12-07 RX ORDER — SODIUM CHLORIDE, SODIUM LACTATE, POTASSIUM CHLORIDE, CALCIUM CHLORIDE 600; 310; 30; 20 MG/100ML; MG/100ML; MG/100ML; MG/100ML
100 INJECTION, SOLUTION INTRAVENOUS CONTINUOUS
Status: DISCONTINUED | OUTPATIENT
Start: 2023-12-07 | End: 2023-12-07 | Stop reason: HOSPADM

## 2023-12-07 RX ORDER — DIPHENHYDRAMINE HCL 25 MG
25 CAPSULE ORAL EVERY 6 HOURS PRN
Status: DISCONTINUED | OUTPATIENT
Start: 2023-12-07 | End: 2023-12-11 | Stop reason: HOSPADM

## 2023-12-07 RX ORDER — OXYCODONE HYDROCHLORIDE 5 MG/1
5 TABLET ORAL EVERY 6 HOURS PRN
Status: DISCONTINUED | OUTPATIENT
Start: 2023-12-07 | End: 2023-12-10

## 2023-12-07 RX ADMIN — LIDOCAINE HYDROCHLORIDE 100 MG: 20 INJECTION INTRAVENOUS at 13:18

## 2023-12-07 RX ADMIN — ONDANSETRON 4 MG: 2 INJECTION INTRAMUSCULAR; INTRAVENOUS at 15:48

## 2023-12-07 RX ADMIN — DEXAMETHASONE SODIUM PHOSPHATE 8 MG: 4 INJECTION, SOLUTION INTRAMUSCULAR; INTRAVENOUS at 13:40

## 2023-12-07 RX ADMIN — PROPOFOL 120 MG: 10 INJECTION, EMULSION INTRAVENOUS at 13:18

## 2023-12-07 RX ADMIN — SODIUM CHLORIDE, SODIUM LACTATE, POTASSIUM CHLORIDE, CALCIUM CHLORIDE AND DEXTROSE MONOHYDRATE 100 ML/HR: 5; 600; 310; 30; 20 INJECTION, SOLUTION INTRAVENOUS at 20:04

## 2023-12-07 RX ADMIN — FAMOTIDINE 20 MG: 10 INJECTION, SOLUTION INTRAVENOUS at 12:12

## 2023-12-07 RX ADMIN — SODIUM CHLORIDE, POTASSIUM CHLORIDE, SODIUM LACTATE AND CALCIUM CHLORIDE 100 ML/HR: 600; 310; 30; 20 INJECTION, SOLUTION INTRAVENOUS at 12:13

## 2023-12-07 RX ADMIN — MORPHINE SULFATE 2 MG: 2 INJECTION, SOLUTION INTRAMUSCULAR; INTRAVENOUS at 16:20

## 2023-12-07 RX ADMIN — ROCURONIUM BROMIDE 20 MG: 10 INJECTION, SOLUTION INTRAVENOUS at 15:05

## 2023-12-07 RX ADMIN — HYDROMORPHONE HYDROCHLORIDE 0.5 MG: 0.5 INJECTION, SOLUTION INTRAMUSCULAR; INTRAVENOUS; SUBCUTANEOUS at 16:31

## 2023-12-07 RX ADMIN — SODIUM CHLORIDE, SODIUM LACTATE, POTASSIUM CHLORIDE, AND CALCIUM CHLORIDE: 600; 310; 30; 20 INJECTION, SOLUTION INTRAVENOUS at 13:00

## 2023-12-07 RX ADMIN — MIDAZOLAM HYDROCHLORIDE 2 MG: 1 INJECTION, SOLUTION INTRAMUSCULAR; INTRAVENOUS at 13:09

## 2023-12-07 RX ADMIN — PHENYLEPHRINE HYDROCHLORIDE 100 MCG: 10 INJECTION INTRAVENOUS at 14:23

## 2023-12-07 RX ADMIN — LIDOCAINE HYDROCHLORIDE 100 MG: 20 INJECTION, SOLUTION EPIDURAL; INFILTRATION; INTRACAUDAL; PERINEURAL at 13:28

## 2023-12-07 RX ADMIN — ROCURONIUM BROMIDE 50 MG: 10 INJECTION, SOLUTION INTRAVENOUS at 13:18

## 2023-12-07 RX ADMIN — HYDROMORPHONE HYDROCHLORIDE 0.5 MG: 0.5 INJECTION, SOLUTION INTRAMUSCULAR; INTRAVENOUS; SUBCUTANEOUS at 17:07

## 2023-12-07 RX ADMIN — DEXAMETHASONE SODIUM PHOSPHATE 4 MG: 4 INJECTION, SOLUTION INTRAMUSCULAR; INTRAVENOUS at 13:28

## 2023-12-07 RX ADMIN — BUPIVACAINE HYDROCHLORIDE 30 ML: 2.5 INJECTION, SOLUTION EPIDURAL; INFILTRATION; INTRACAUDAL; PERINEURAL at 13:24

## 2023-12-07 RX ADMIN — HYDROMORPHONE HYDROCHLORIDE 0.5 MG: 0.5 INJECTION, SOLUTION INTRAMUSCULAR; INTRAVENOUS; SUBCUTANEOUS at 16:53

## 2023-12-07 RX ADMIN — GLYCOPYRROLATE 0.2 MG: 0.2 INJECTION, SOLUTION INTRAMUSCULAR; INTRAVENOUS at 14:13

## 2023-12-07 RX ADMIN — PHENYLEPHRINE HYDROCHLORIDE 100 MCG: 10 INJECTION INTRAVENOUS at 14:21

## 2023-12-07 RX ADMIN — CEFOXITIN SODIUM 100 ML: 2 POWDER, FOR SOLUTION INTRAVENOUS at 13:10

## 2023-12-07 RX ADMIN — BUPIVACAINE HYDROCHLORIDE 30 ML: 2.5 INJECTION, SOLUTION EPIDURAL; INFILTRATION; INTRACAUDAL; PERINEURAL at 13:28

## 2023-12-07 RX ADMIN — ROCURONIUM BROMIDE 20 MG: 10 INJECTION, SOLUTION INTRAVENOUS at 14:15

## 2023-12-07 RX ADMIN — SUGAMMADEX 200 MG: 100 INJECTION, SOLUTION INTRAVENOUS at 15:48

## 2023-12-07 RX ADMIN — DEXMEDETOMIDINE HCL 8 MCG: 100 INJECTION INTRAVENOUS at 15:48

## 2023-12-07 RX ADMIN — LIDOCAINE HYDROCHLORIDE 100 MG: 20 INJECTION, SOLUTION EPIDURAL; INFILTRATION; INTRACAUDAL; PERINEURAL at 13:24

## 2023-12-07 RX ADMIN — DEXAMETHASONE SODIUM PHOSPHATE 4 MG: 4 INJECTION, SOLUTION INTRAMUSCULAR; INTRAVENOUS at 13:24

## 2023-12-07 RX ADMIN — FENTANYL CITRATE 100 MCG: 50 INJECTION, SOLUTION INTRAMUSCULAR; INTRAVENOUS at 13:18

## 2023-12-07 SDOH — SOCIAL STABILITY: SOCIAL INSECURITY: ABUSE: ADULT

## 2023-12-07 SDOH — SOCIAL STABILITY: SOCIAL INSECURITY: ARE THERE ANY APPARENT SIGNS OF INJURIES/BEHAVIORS THAT COULD BE RELATED TO ABUSE/NEGLECT?: NO

## 2023-12-07 SDOH — SOCIAL STABILITY: SOCIAL INSECURITY: DO YOU FEEL UNSAFE GOING BACK TO THE PLACE WHERE YOU ARE LIVING?: NO

## 2023-12-07 SDOH — SOCIAL STABILITY: SOCIAL INSECURITY: DO YOU FEEL ANYONE HAS EXPLOITED OR TAKEN ADVANTAGE OF YOU FINANCIALLY OR OF YOUR PERSONAL PROPERTY?: NO

## 2023-12-07 SDOH — SOCIAL STABILITY: SOCIAL INSECURITY: WERE YOU ABLE TO COMPLETE ALL THE BEHAVIORAL HEALTH SCREENINGS?: YES

## 2023-12-07 SDOH — SOCIAL STABILITY: SOCIAL INSECURITY: ARE YOU OR HAVE YOU BEEN THREATENED OR ABUSED PHYSICALLY, EMOTIONALLY, OR SEXUALLY BY ANYONE?: NO

## 2023-12-07 SDOH — HEALTH STABILITY: MENTAL HEALTH: CURRENT SMOKER: 0

## 2023-12-07 SDOH — SOCIAL STABILITY: SOCIAL INSECURITY: HAVE YOU HAD THOUGHTS OF HARMING ANYONE ELSE?: NO

## 2023-12-07 SDOH — SOCIAL STABILITY: SOCIAL INSECURITY: HAS ANYONE EVER THREATENED TO HURT YOUR FAMILY OR YOUR PETS?: NO

## 2023-12-07 SDOH — SOCIAL STABILITY: SOCIAL INSECURITY: DOES ANYONE TRY TO KEEP YOU FROM HAVING/CONTACTING OTHER FRIENDS OR DOING THINGS OUTSIDE YOUR HOME?: NO

## 2023-12-07 ASSESSMENT — PAIN SCALES - GENERAL
PAINLEVEL_OUTOF10: 7
PAINLEVEL_OUTOF10: 4
PAINLEVEL_OUTOF10: 3
PAINLEVEL_OUTOF10: 7
PAINLEVEL_OUTOF10: 7
PAINLEVEL_OUTOF10: 4
PAINLEVEL_OUTOF10: 4
PAINLEVEL_OUTOF10: 7
PAINLEVEL_OUTOF10: 7
PAIN_LEVEL: 5
PAINLEVEL_OUTOF10: 4

## 2023-12-07 ASSESSMENT — COGNITIVE AND FUNCTIONAL STATUS - GENERAL
MOBILITY SCORE: 24
PATIENT BASELINE BEDBOUND: NO
DAILY ACTIVITIY SCORE: 24

## 2023-12-07 ASSESSMENT — PAIN - FUNCTIONAL ASSESSMENT

## 2023-12-07 ASSESSMENT — ACTIVITIES OF DAILY LIVING (ADL)
TOILETING: INDEPENDENT
FEEDING YOURSELF: INDEPENDENT
ADEQUATE_TO_COMPLETE_ADL: YES
PATIENT'S MEMORY ADEQUATE TO SAFELY COMPLETE DAILY ACTIVITIES?: YES
HEARING - LEFT EAR: FUNCTIONAL
JUDGMENT_ADEQUATE_SAFELY_COMPLETE_DAILY_ACTIVITIES: YES
WALKS IN HOME: INDEPENDENT
HEARING - RIGHT EAR: FUNCTIONAL
DRESSING YOURSELF: INDEPENDENT
BATHING: INDEPENDENT
LACK_OF_TRANSPORTATION: NO
GROOMING: INDEPENDENT

## 2023-12-07 ASSESSMENT — PAIN DESCRIPTION - LOCATION
LOCATION: ABDOMEN

## 2023-12-07 ASSESSMENT — COLUMBIA-SUICIDE SEVERITY RATING SCALE - C-SSRS
2. HAVE YOU ACTUALLY HAD ANY THOUGHTS OF KILLING YOURSELF?: NO
1. IN THE PAST MONTH, HAVE YOU WISHED YOU WERE DEAD OR WISHED YOU COULD GO TO SLEEP AND NOT WAKE UP?: NO
6. HAVE YOU EVER DONE ANYTHING, STARTED TO DO ANYTHING, OR PREPARED TO DO ANYTHING TO END YOUR LIFE?: NO

## 2023-12-07 ASSESSMENT — LIFESTYLE VARIABLES
HOW MANY STANDARD DRINKS CONTAINING ALCOHOL DO YOU HAVE ON A TYPICAL DAY: PATIENT DOES NOT DRINK
AUDIT-C TOTAL SCORE: 0
HOW OFTEN DO YOU HAVE 6 OR MORE DRINKS ON ONE OCCASION: NEVER
SKIP TO QUESTIONS 9-10: 1
PRESCIPTION_ABUSE_PAST_12_MONTHS: NO
SUBSTANCE_ABUSE_PAST_12_MONTHS: NO
HOW OFTEN DO YOU HAVE A DRINK CONTAINING ALCOHOL: NEVER
AUDIT-C TOTAL SCORE: 0

## 2023-12-07 NOTE — ANESTHESIA PROCEDURE NOTES
"Peripheral Block    Patient location during procedure: OR  Start time: 12/7/2023 1:22 PM  End time: 12/7/2023 1:28 PM  Reason for block: procedure for pain, at surgeon's request and post-op pain management  Staffing  Performed: CRNA   Authorized by: HENNA Olivares    Performed by: HENNA Reno  Preanesthetic Checklist  Completed: patient identified, IV checked, site marked, risks and benefits discussed, surgical consent, monitors and equipment checked, pre-op evaluation and timeout performed   Timeout performed at: 12/7/2023 1:14 PM  Peripheral Block  Patient position: laying flat  Prep: ChloraPrep  Patient monitoring: heart rate, cardiac monitor and continuous pulse ox  Block type: TAP  Laterality: B/L  Injection technique: single-shot  Local infiltration: bupivicaine  Infiltration strength: 0.3 %  Dose: 60 mL  Needle  Needle gauge: 20 G  Needle localization: ultrasound guidance  Assessment  Injection assessment: negative aspiration for heme, no paresthesia on injection, incremental injection and local visualized surrounding nerve on ultrasound  Paresthesia pain: none  Heart rate change: no  Slow fractionated injection: yes  Additional Notes  Anesthesia consult was placed by Dr. Nguyen for post procedural analgesia.  The patient's chart was reviewed and they were deemed an appropriate candidate for the procedure. The patient was educated in detail on the risks, benefits, and alternatives to the block including but not limited to: temporary or permanent nerve damage, bleeding, infection, damage to surrounding tissues, possible block failure and the potential for local anesthesia toxicity syndrome.  The patient expressed understanding and all questions were answered prior to the initiation of the procedure.  Informed consent was also signed by the patient and laterality determined per institutional policy.  A formal \"time out \"consistent with the institutions rules and regulations " was performed by the anesthesia provider and appropriate RN.     Procedure  The patient was placed in the supine position. The patient was marked bilaterally and skin prep applied and allowed to dry. Proper monitors were applied with oxygen.  The patient was placed under general anesthesia (see induction note).     A high frequency ultrasound probe with probe cover was placed over the three layers of the abdominal musculature and grossly identified using sterile coupling gel following institutional skin prep. The three layers of the abdominal musculature were carefully identified  MIDAXILLARY region.  A 20g 4 inch stimuplex needle  was then advanced maintaining an in-plane visualization throughout the procedure, under ultrasound guidance from medial to lateral to come to rest just deep to the internal oblique muscle. Upon negative aspiration, 5ml 2% lidocaine 30 ml 0.25% Bupivacaine with 4mg decadron preservative free was administered safely and cautiously on each side. Aspiration every 3-5ml was done to avoid potential intravascular injection.  All injections were done without resistance and were free of blood. The patient tolerated the procedure well. No complications noted.

## 2023-12-07 NOTE — CARE PLAN
The patient's goals for the shift include      The clinical goals for the shift include pain management      Problem: Pain  Goal: My pain/discomfort is manageable  Outcome: Progressing     Problem: Safety  Goal: Patient will be injury free during hospitalization  Outcome: Progressing  Goal: I will remain free of falls  Outcome: Progressing

## 2023-12-07 NOTE — ANESTHESIA PREPROCEDURE EVALUATION
Patient: Xochitl Pichardo    Procedure Information       Date/Time: 12/07/23 1300    Procedure: SIGMOID COLECTOMY *SDS* (ERAS PROTOCOL) (Abdomen) - ERA PROTOCOL GIVEN TO PATIENT  DX: D12.5 SIGMOID MASS  120 MIN    Location: [facility] OR 08 / Virtual [facility]    Surgeons: Estrellita Nguyen MD            Relevant Problems   Endocrine   (+) Hypothyroidism       Clinical information reviewed:   Tobacco  Allergies  Meds  Problems  Med Hx  Surg Hx   Fam Hx  Soc   Hx        NPO Detail:  NPO/Void Status  Date of Last Liquid: 12/06/23  Time of Last Liquid: 2300  Date of Last Solid: 12/06/23  Time of Last Solid: 2300  Last Intake Type: Clear fluids  Time of Last Void: 0700         Physical Exam    Airway  Mallampati: II  TM distance: >3 FB     Cardiovascular - normal exam     Dental - normal exam     Pulmonary - normal exam     Abdominal - normal exam             Anesthesia Plan    ASA 3     general     The patient is not a current smoker.    Anesthetic plan and risks discussed with patient.  Use of blood products discussed with patient who.    Plan discussed with CRNA.

## 2023-12-07 NOTE — ANESTHESIA PROCEDURE NOTES
Airway  Date/Time: 12/7/2023 1:21 PM  Urgency: elective    Airway not difficult    Staffing  Performed: CRNA   Authorized by: HENNA Olivares    Performed by: HENNA Olivares  Patient location during procedure: OR    Indications and Patient Condition  Indications for airway management: anesthesia and airway protection  Preoxygenated: yes  Patient position: sniffing  Mask difficulty assessment: 1 - vent by mask    Final Airway Details  Final airway type: endotracheal airway      Successful airway: ETT  Cuffed: yes   Successful intubation technique: video laryngoscopy  Facilitating devices/methods: intubating stylet  Endotracheal tube insertion site: oral  Blade type: CourseHorse.  Blade size: #3  ETT size (mm): 7.0  Cormack-Lehane Classification: grade I - full view of glottis  Placement verified by: chest auscultation, capnometry and palpation of cuff   Measured from: lips  ETT to lips (cm): 22  Number of attempts at approach: 1  Number of other approaches attempted: 0

## 2023-12-07 NOTE — SIGNIFICANT EVENT
Dr Nguyen at bedside to speak to with pt/. Notified her of rash surrounding stoma. NNO at this time, to proceed with surgery.

## 2023-12-07 NOTE — ANESTHESIA POSTPROCEDURE EVALUATION
Patient: Xochitl Pichardo    Procedure Summary       Date: 12/07/23 Room / Location: [facility] 08 / [facility]    Anesthesia Start: 1310 Anesthesia Stop: 1611    Procedure: SIGMOID COLECTOMY *SDS* (ERAS PROTOCOL) (Abdomen) Diagnosis: (SIGMOID MASS D12.5)    Surgeons: Estrellita Nguyen MD Responsible Provider: HENNA Olivares    Anesthesia Type: general ASA Status: 3            Anesthesia Type: general    Vitals Value Taken Time   /76 12/07/23 1651   Temp 37 °C (98.6 °F) 12/07/23 1602   Pulse 75 12/07/23 1653   Resp 10 12/07/23 1653   SpO2 100 % 12/07/23 1653   Vitals shown include unvalidated device data.    Anesthesia Post Evaluation    Patient location during evaluation: PACU  Patient participation: complete - patient participated  Level of consciousness: awake  Pain score: 5  Pain management: adequate  Airway patency: patent  Cardiovascular status: acceptable  Respiratory status: acceptable  Hydration status: acceptable  Postoperative Nausea and Vomiting: mild        There were no known notable events for this encounter.

## 2023-12-08 LAB
ANION GAP SERPL CALC-SCNC: 16 MMOL/L (ref 10–20)
BUN SERPL-MCNC: 15 MG/DL (ref 6–23)
CALCIUM SERPL-MCNC: 9.3 MG/DL (ref 8.6–10.3)
CHLORIDE SERPL-SCNC: 103 MMOL/L (ref 98–107)
CO2 SERPL-SCNC: 23 MMOL/L (ref 21–32)
CREAT SERPL-MCNC: 0.75 MG/DL (ref 0.5–1.05)
ERYTHROCYTE [DISTWIDTH] IN BLOOD BY AUTOMATED COUNT: 13.5 % (ref 11.5–14.5)
GFR SERPL CREATININE-BSD FRML MDRD: >90 ML/MIN/1.73M*2
GLUCOSE SERPL-MCNC: 228 MG/DL (ref 74–99)
HCT VFR BLD AUTO: 37.3 % (ref 36–46)
HGB BLD-MCNC: 12 G/DL (ref 12–16)
MAGNESIUM SERPL-MCNC: 1.75 MG/DL (ref 1.6–2.4)
MCH RBC QN AUTO: 28.8 PG (ref 26–34)
MCHC RBC AUTO-ENTMCNC: 32.2 G/DL (ref 32–36)
MCV RBC AUTO: 90 FL (ref 80–100)
NRBC BLD-RTO: 0 /100 WBCS (ref 0–0)
PLATELET # BLD AUTO: 233 X10*3/UL (ref 150–450)
POTASSIUM SERPL-SCNC: 4.9 MMOL/L (ref 3.5–5.3)
RBC # BLD AUTO: 4.16 X10*6/UL (ref 4–5.2)
SODIUM SERPL-SCNC: 137 MMOL/L (ref 136–145)
WBC # BLD AUTO: 10.9 X10*3/UL (ref 4.4–11.3)

## 2023-12-08 PROCEDURE — 36415 COLL VENOUS BLD VENIPUNCTURE: CPT | Performed by: SURGERY

## 2023-12-08 PROCEDURE — 1100000001 HC PRIVATE ROOM DAILY

## 2023-12-08 PROCEDURE — 83735 ASSAY OF MAGNESIUM: CPT | Performed by: SURGERY

## 2023-12-08 PROCEDURE — 2500000004 HC RX 250 GENERAL PHARMACY W/ HCPCS (ALT 636 FOR OP/ED): Performed by: SURGERY

## 2023-12-08 PROCEDURE — 85027 COMPLETE CBC AUTOMATED: CPT | Performed by: SURGERY

## 2023-12-08 PROCEDURE — 82374 ASSAY BLOOD CARBON DIOXIDE: CPT | Performed by: SURGERY

## 2023-12-08 PROCEDURE — 96372 THER/PROPH/DIAG INJ SC/IM: CPT | Performed by: SURGERY

## 2023-12-08 RX ADMIN — HYDROMORPHONE HYDROCHLORIDE 0.2 MG: 0.2 INJECTION, SOLUTION INTRAMUSCULAR; INTRAVENOUS; SUBCUTANEOUS at 18:35

## 2023-12-08 RX ADMIN — ENOXAPARIN SODIUM 40 MG: 40 INJECTION SUBCUTANEOUS at 08:24

## 2023-12-08 RX ADMIN — HYDROMORPHONE HYDROCHLORIDE 0.2 MG: 0.2 INJECTION, SOLUTION INTRAMUSCULAR; INTRAVENOUS; SUBCUTANEOUS at 15:09

## 2023-12-08 RX ADMIN — HYDROMORPHONE HYDROCHLORIDE 0.2 MG: 0.2 INJECTION, SOLUTION INTRAMUSCULAR; INTRAVENOUS; SUBCUTANEOUS at 12:09

## 2023-12-08 RX ADMIN — HYDROMORPHONE HYDROCHLORIDE 0.2 MG: 0.2 INJECTION, SOLUTION INTRAMUSCULAR; INTRAVENOUS; SUBCUTANEOUS at 21:39

## 2023-12-08 RX ADMIN — MORPHINE SULFATE 4 MG: 2 INJECTION, SOLUTION INTRAMUSCULAR; INTRAVENOUS at 07:39

## 2023-12-08 ASSESSMENT — COGNITIVE AND FUNCTIONAL STATUS - GENERAL
MOVING FROM LYING ON BACK TO SITTING ON SIDE OF FLAT BED WITH BEDRAILS: A LITTLE
DAILY ACTIVITIY SCORE: 24
DRESSING REGULAR LOWER BODY CLOTHING: A LITTLE
TURNING FROM BACK TO SIDE WHILE IN FLAT BAD: A LITTLE
DAILY ACTIVITIY SCORE: 22
MOBILITY SCORE: 22
HELP NEEDED FOR BATHING: A LITTLE
MOBILITY SCORE: 24

## 2023-12-08 ASSESSMENT — PAIN SCALES - GENERAL
PAINLEVEL_OUTOF10: 8
PAINLEVEL_OUTOF10: 9
PAINLEVEL_OUTOF10: 10 - WORST POSSIBLE PAIN
PAINLEVEL_OUTOF10: 7

## 2023-12-08 ASSESSMENT — ENCOUNTER SYMPTOMS
ABDOMINAL PAIN: 1
CHILLS: 0
COUGH: 0
SHORTNESS OF BREATH: 0
VOMITING: 0
NUMBNESS: 0
LIGHT-HEADEDNESS: 0
ABDOMINAL DISTENTION: 0
FEVER: 0
CHEST TIGHTNESS: 0
HEADACHES: 0
DIZZINESS: 0
FATIGUE: 0
NAUSEA: 0
PALPITATIONS: 0

## 2023-12-08 NOTE — OP NOTE
SIGMOID COLECTOMY *SDS* (ERAS PROTOCOL) Operative Note     Date: 2023  OR Location: [facility]    Name: Xochitl Pichardo, : 1965, Age: 57 y.o., MRN: [id number], Sex: female    Diagnosis  * No Diagnosis Codes entered * * No Diagnosis Codes entered *     Procedures  SIGMOID COLECTOMY *SDS* (ERAS PROTOCOL)  91490 - [place] COLECTOMY PARTIAL W/ANASTOMOSIS      Surgeons      * Estrellita Nguyen - Primary    Resident/Fellow/Other Assistant:  Surgeon(s) and Role:     * Srinivas Shaffer MD - Assisting    Procedure Summary  Anesthesia: General  ASA: III  Anesthesia Staff: CRNA: DEMARCUS RenoCRNA; HENNA Olivares  Estimated Blood Loss: 22mL  Intra-op Medications:   Medication Name Total Dose   cefOXitin (Mefoxin) 2 g in sodium chloride 0.9% 100 mL              Anesthesia Record               Intraprocedure I/O Totals          Intake    LR 2800.00 mL    Total Intake 2800 mL       Output    Urine 260 mL    Est. Blood Loss 22 mL    Total Output 282 mL       Net    Net Volume 2518 mL          Specimen:   ID Type Source Tests Collected by Time   1 : SIGMOID COLON Tissue COLON - SIGMOID RESECTION SURGICAL PATHOLOGY EXAM Estrellita Nguyen MD 2023 1500        Staff:   Circulator: Carline Carreno RN  Relief Circulator: Yasmeen Ruiz RN  Scrub Person: Gisele Guerrero; Taryn Caldwell RN         Drains and/or Catheters:   Colostomy Loop LLQ (Active)   Stomal Appliance 1 piece 23   Site/Stoma Assessment Clean;Intact 23 0749   Peristomal Assessment Rash 23 0749   Output (mL) 0 mL 23       Tourniquet Times:         Implants:     Findings: Obstructing mass in the sigmoid colon, 5 cm cyst of the right ovary    Indications: Xochitl Pichardo is an 57 y.o. female who is having surgery for SIGMOID MASS D12.5. Obstructing sigmoid mass.    The patient was seen in the preoperative area. The risks, benefits, complications, treatment options, non-operative alternatives, expected recovery and outcomes  were discussed with the patient. The possibilities of reaction to medication, pulmonary aspiration, injury to surrounding structures, bleeding, recurrent infection, the need for additional procedures, failure to diagnose a condition, and creating a complication requiring transfusion or operation were discussed with the patient. The patient concurred with the proposed plan, giving informed consent.  The site of surgery was properly noted/marked if necessary per policy. The patient has been actively warmed in preoperative area. Preoperative antibiotics have been ordered and given within 1 hours of incision. Venous thrombosis prophylaxis have been ordered including bilateral sequential compression devices    Procedure Details: Procedure:  Pt was taken to the OR placed in supine position.  General anesthesia was administered, pt was endotracheal intubated,  A block was placed by anesthesia.  The ostomy was sutured closed with 2-0 Vycril and the bridge was removed and sutures were taken out. Liu catheter inserted, Pt placed into lithotomy position.  Abdomen and rectum were prepped and draped in sterile fashion with betadine.    Previous scar was localized with Marcaine. Incision was made through skin, subcutaneous tissue to the level of the fascia,  This was opened and peritoneum was entered, this was extended from above the umbilicus to the pubic tubercle.  There were some adhesions of the small bowel and ovaries and tubes adherent to the remaining sigmoid colon this required an hour of dissection to free the sigmoid..One loop of small bowel was adherent to the previous incision and did require over sewing of the serosa with interrupted 3-0 silk sutures.  There was noted to be a right ovarian cyst Dr Mistry was spoken to and it was decided to address this at the colostomy takedown surgery if need be. The Belfour was placed for retraction.  The sigmoid was transected with ENRIQUE stapler and the mesentery of the lower  sigmoid was taken down with Ligasure.  The upper rectum was closed with 60 mm TA stapler and cut.  The purse johnathan was used across the proximal sigmoid after the fat was cleared at the staple line.  The anvil was inserted and held in position with the suture.  The 29 EEA was inserted through the rectum and deployed through the proximal rectum, this was connected to the anvil and fired.  The anastamosis was checked with a flex sig.  No air leak was identified and no bleeding was seen at he stapled site with the scope.  The irrigation was suctioned from the abd.   The fascia was closed with double looped PDS, sub cutaneous tissue was irrigated Skin was closed with staples.  Dressing applied, pt awakened, extubated and taken to PACU in stable condition.  Complications:  None; patient tolerated the procedure well.    Disposition: PACU - hemodynamically stable.  Condition: stable         Additional Details: none    Attending Attestation: A qualified resident physician was not available.    Estrellita Nguyen  Phone Number: [phone]

## 2023-12-08 NOTE — PROGRESS NOTES
12/08/23 1516   Discharge Planning   Living Arrangements Spouse/significant other   Support Systems Spouse/significant other   Assistance Needed none   Type of Residence Private residence   Home or Post Acute Services None   Patient expects to be discharged to: home   Does the patient need discharge transport arranged? No     Discharge planning assessment completed with patient and spouse at bedside. Patient admitted for colectomy. She had a bowel resection with ostomy creation in October. At that time, patient was discharged home with home health care. Per patient, insurance denied [facility], which left patient scrambling to obtain ostomy supplies. She is now set up with [facility] and has had no further issues ordering supplies. Patient plans to return home at discharge and denies needs at this time. Care Transitions team will continue to follow patient throughout her hospital stay.

## 2023-12-09 LAB
ANION GAP SERPL CALC-SCNC: 10 MMOL/L (ref 10–20)
BASOPHILS # BLD AUTO: 0.03 X10*3/UL (ref 0–0.1)
BASOPHILS NFR BLD AUTO: 0.4 %
BUN SERPL-MCNC: 14 MG/DL (ref 6–23)
CALCIUM SERPL-MCNC: 8.9 MG/DL (ref 8.6–10.3)
CHLORIDE SERPL-SCNC: 108 MMOL/L (ref 98–107)
CO2 SERPL-SCNC: 25 MMOL/L (ref 21–32)
CREAT SERPL-MCNC: 0.63 MG/DL (ref 0.5–1.05)
EOSINOPHIL # BLD AUTO: 0.07 X10*3/UL (ref 0–0.7)
EOSINOPHIL NFR BLD AUTO: 0.9 %
ERYTHROCYTE [DISTWIDTH] IN BLOOD BY AUTOMATED COUNT: 14.1 % (ref 11.5–14.5)
GFR SERPL CREATININE-BSD FRML MDRD: >90 ML/MIN/1.73M*2
GLUCOSE SERPL-MCNC: 102 MG/DL (ref 74–99)
HCT VFR BLD AUTO: 34.5 % (ref 36–46)
HGB BLD-MCNC: 11 G/DL (ref 12–16)
IMM GRANULOCYTES # BLD AUTO: 0.01 X10*3/UL (ref 0–0.7)
IMM GRANULOCYTES NFR BLD AUTO: 0.1 % (ref 0–0.9)
LYMPHOCYTES # BLD AUTO: 1.98 X10*3/UL (ref 1.2–4.8)
LYMPHOCYTES NFR BLD AUTO: 25.9 %
MCH RBC QN AUTO: 28.9 PG (ref 26–34)
MCHC RBC AUTO-ENTMCNC: 31.9 G/DL (ref 32–36)
MCV RBC AUTO: 91 FL (ref 80–100)
MONOCYTES # BLD AUTO: 0.63 X10*3/UL (ref 0.1–1)
MONOCYTES NFR BLD AUTO: 8.2 %
NEUTROPHILS # BLD AUTO: 4.92 X10*3/UL (ref 1.2–7.7)
NEUTROPHILS NFR BLD AUTO: 64.5 %
NRBC BLD-RTO: 0 /100 WBCS (ref 0–0)
PLATELET # BLD AUTO: 187 X10*3/UL (ref 150–450)
POTASSIUM SERPL-SCNC: 4.2 MMOL/L (ref 3.5–5.3)
RBC # BLD AUTO: 3.81 X10*6/UL (ref 4–5.2)
SODIUM SERPL-SCNC: 139 MMOL/L (ref 136–145)
WBC # BLD AUTO: 7.6 X10*3/UL (ref 4.4–11.3)

## 2023-12-09 PROCEDURE — 2500000004 HC RX 250 GENERAL PHARMACY W/ HCPCS (ALT 636 FOR OP/ED): Performed by: SURGERY

## 2023-12-09 PROCEDURE — 82374 ASSAY BLOOD CARBON DIOXIDE: CPT

## 2023-12-09 PROCEDURE — 36415 COLL VENOUS BLD VENIPUNCTURE: CPT

## 2023-12-09 PROCEDURE — 1100000001 HC PRIVATE ROOM DAILY

## 2023-12-09 PROCEDURE — 2500000005 HC RX 250 GENERAL PHARMACY W/O HCPCS

## 2023-12-09 PROCEDURE — 85025 COMPLETE CBC W/AUTO DIFF WBC: CPT

## 2023-12-09 PROCEDURE — 2500000005 HC RX 250 GENERAL PHARMACY W/O HCPCS: Performed by: SURGERY

## 2023-12-09 PROCEDURE — 96372 THER/PROPH/DIAG INJ SC/IM: CPT | Performed by: SURGERY

## 2023-12-09 RX ORDER — LIDOCAINE 560 MG/1
1 PATCH PERCUTANEOUS; TOPICAL; TRANSDERMAL DAILY
Status: DISCONTINUED | OUTPATIENT
Start: 2023-12-09 | End: 2023-12-10

## 2023-12-09 RX ADMIN — HYDROMORPHONE HYDROCHLORIDE 0.2 MG: 0.2 INJECTION, SOLUTION INTRAMUSCULAR; INTRAVENOUS; SUBCUTANEOUS at 01:23

## 2023-12-09 RX ADMIN — SODIUM CHLORIDE, SODIUM LACTATE, POTASSIUM CHLORIDE, CALCIUM CHLORIDE AND DEXTROSE MONOHYDRATE 100 ML/HR: 5; 600; 310; 30; 20 INJECTION, SOLUTION INTRAVENOUS at 19:42

## 2023-12-09 RX ADMIN — HYDROMORPHONE HYDROCHLORIDE 0.2 MG: 0.2 INJECTION, SOLUTION INTRAMUSCULAR; INTRAVENOUS; SUBCUTANEOUS at 22:29

## 2023-12-09 RX ADMIN — LIDOCAINE 1 PATCH: 4 PATCH TOPICAL at 08:11

## 2023-12-09 RX ADMIN — HYDROMORPHONE HYDROCHLORIDE 0.2 MG: 0.2 INJECTION, SOLUTION INTRAMUSCULAR; INTRAVENOUS; SUBCUTANEOUS at 19:31

## 2023-12-09 RX ADMIN — HYDROMORPHONE HYDROCHLORIDE 0.2 MG: 0.2 INJECTION, SOLUTION INTRAMUSCULAR; INTRAVENOUS; SUBCUTANEOUS at 08:14

## 2023-12-09 RX ADMIN — ENOXAPARIN SODIUM 40 MG: 40 INJECTION SUBCUTANEOUS at 08:11

## 2023-12-09 RX ADMIN — Medication: at 08:00

## 2023-12-09 RX ADMIN — PANTOPRAZOLE SODIUM 40 MG: 40 TABLET, DELAYED RELEASE ORAL at 08:11

## 2023-12-09 RX ADMIN — HYDROMORPHONE HYDROCHLORIDE 0.2 MG: 0.2 INJECTION, SOLUTION INTRAMUSCULAR; INTRAVENOUS; SUBCUTANEOUS at 14:57

## 2023-12-09 RX ADMIN — HYDROMORPHONE HYDROCHLORIDE 0.2 MG: 0.2 INJECTION, SOLUTION INTRAMUSCULAR; INTRAVENOUS; SUBCUTANEOUS at 04:45

## 2023-12-09 RX ADMIN — SODIUM CHLORIDE, SODIUM LACTATE, POTASSIUM CHLORIDE, CALCIUM CHLORIDE AND DEXTROSE MONOHYDRATE 100 ML/HR: 5; 600; 310; 30; 20 INJECTION, SOLUTION INTRAVENOUS at 10:36

## 2023-12-09 RX ADMIN — HYDROMORPHONE HYDROCHLORIDE 0.2 MG: 0.2 INJECTION, SOLUTION INTRAMUSCULAR; INTRAVENOUS; SUBCUTANEOUS at 11:39

## 2023-12-09 ASSESSMENT — COGNITIVE AND FUNCTIONAL STATUS - GENERAL
HELP NEEDED FOR BATHING: A LITTLE
MOBILITY SCORE: 22
TURNING FROM BACK TO SIDE WHILE IN FLAT BAD: A LITTLE
DAILY ACTIVITIY SCORE: 22
DAILY ACTIVITIY SCORE: 23
DRESSING REGULAR LOWER BODY CLOTHING: A LITTLE
DRESSING REGULAR LOWER BODY CLOTHING: A LITTLE
MOBILITY SCORE: 22
TURNING FROM BACK TO SIDE WHILE IN FLAT BAD: A LITTLE
MOVING FROM LYING ON BACK TO SITTING ON SIDE OF FLAT BED WITH BEDRAILS: A LITTLE
MOVING FROM LYING ON BACK TO SITTING ON SIDE OF FLAT BED WITH BEDRAILS: A LITTLE

## 2023-12-09 ASSESSMENT — PAIN - FUNCTIONAL ASSESSMENT
PAIN_FUNCTIONAL_ASSESSMENT: 0-10

## 2023-12-09 ASSESSMENT — PAIN SCALES - GENERAL
PAINLEVEL_OUTOF10: 7
PAINLEVEL_OUTOF10: 8
PAINLEVEL_OUTOF10: 9
PAINLEVEL_OUTOF10: 7
PAINLEVEL_OUTOF10: 4
PAINLEVEL_OUTOF10: 5 - MODERATE PAIN
PAINLEVEL_OUTOF10: 5 - MODERATE PAIN
PAINLEVEL_OUTOF10: 8
PAINLEVEL_OUTOF10: 7
PAINLEVEL_OUTOF10: 8

## 2023-12-09 NOTE — CARE PLAN
Problem: Pain  Goal: My pain/discomfort is manageable  Outcome: Progressing     Problem: Safety  Goal: Patient will be injury free during hospitalization  Outcome: Progressing  Goal: I will remain free of falls  Outcome: Progressing   The patient's goals for the shift include      The clinical goals for the shift include pain management

## 2023-12-09 NOTE — PROGRESS NOTES
"Xochitl Pichardo is a 57 y.o. female on day 2 of admission presenting with Colon cancer (CMS/HCC).    Subjective   Doing well tolerating liquids, pain better controlled.        Objective     Physical Exam  RRR Non labored breathing, Abd soft ostomy with gas no stool yet. staples intact.     Last Recorded Vitals  Blood pressure 117/76, pulse 74, temperature 36.7 °C (98.1 °F), temperature source Temporal, resp. rate 16, height 1.702 m (5' 7\"), weight 70.3 kg (155 lb), SpO2 98 %.  Intake/Output last 3 Shifts:  I/O last 3 completed shifts:  In: 2831.2 (40.3 mL/kg) [P.O.:600; I.V.:2231.2 (31.7 mL/kg)]  Out: 1125 (16 mL/kg) [Urine:1125 (0.4 mL/kg/hr)]  Weight: 70.3 kg     Relevant Results                             Assessment/Plan   Principal Problem:    Colon cancer (CMS/HCC)    POD #2  Doing well,  await return of bowel function.        I spent 15 minutes in the professional and overall care of this patient.      Estrellita Nguyen MD      "

## 2023-12-10 LAB
ANION GAP SERPL CALC-SCNC: 8 MMOL/L (ref 10–20)
BASOPHILS # BLD AUTO: 0.04 X10*3/UL (ref 0–0.1)
BASOPHILS NFR BLD AUTO: 0.7 %
BUN SERPL-MCNC: 9 MG/DL (ref 6–23)
CALCIUM SERPL-MCNC: 9.1 MG/DL (ref 8.6–10.3)
CHLORIDE SERPL-SCNC: 105 MMOL/L (ref 98–107)
CO2 SERPL-SCNC: 30 MMOL/L (ref 21–32)
CREAT SERPL-MCNC: 0.59 MG/DL (ref 0.5–1.05)
EOSINOPHIL # BLD AUTO: 0.24 X10*3/UL (ref 0–0.7)
EOSINOPHIL NFR BLD AUTO: 4.3 %
ERYTHROCYTE [DISTWIDTH] IN BLOOD BY AUTOMATED COUNT: 13.5 % (ref 11.5–14.5)
GFR SERPL CREATININE-BSD FRML MDRD: >90 ML/MIN/1.73M*2
GLUCOSE SERPL-MCNC: 88 MG/DL (ref 74–99)
HCT VFR BLD AUTO: 33.9 % (ref 36–46)
HGB BLD-MCNC: 10.9 G/DL (ref 12–16)
IMM GRANULOCYTES # BLD AUTO: 0.01 X10*3/UL (ref 0–0.7)
IMM GRANULOCYTES NFR BLD AUTO: 0.2 % (ref 0–0.9)
LYMPHOCYTES # BLD AUTO: 2.05 X10*3/UL (ref 1.2–4.8)
LYMPHOCYTES NFR BLD AUTO: 36.5 %
MCH RBC QN AUTO: 29.5 PG (ref 26–34)
MCHC RBC AUTO-ENTMCNC: 32.2 G/DL (ref 32–36)
MCV RBC AUTO: 92 FL (ref 80–100)
MONOCYTES # BLD AUTO: 0.49 X10*3/UL (ref 0.1–1)
MONOCYTES NFR BLD AUTO: 8.7 %
NEUTROPHILS # BLD AUTO: 2.78 X10*3/UL (ref 1.2–7.7)
NEUTROPHILS NFR BLD AUTO: 49.6 %
NRBC BLD-RTO: 0 /100 WBCS (ref 0–0)
PLATELET # BLD AUTO: 173 X10*3/UL (ref 150–450)
POTASSIUM SERPL-SCNC: 3.7 MMOL/L (ref 3.5–5.3)
RBC # BLD AUTO: 3.7 X10*6/UL (ref 4–5.2)
SODIUM SERPL-SCNC: 139 MMOL/L (ref 136–145)
WBC # BLD AUTO: 5.6 X10*3/UL (ref 4.4–11.3)

## 2023-12-10 PROCEDURE — 96372 THER/PROPH/DIAG INJ SC/IM: CPT | Performed by: SURGERY

## 2023-12-10 PROCEDURE — 2500000004 HC RX 250 GENERAL PHARMACY W/ HCPCS (ALT 636 FOR OP/ED): Performed by: SURGERY

## 2023-12-10 PROCEDURE — 99024 POSTOP FOLLOW-UP VISIT: CPT | Performed by: SURGERY

## 2023-12-10 PROCEDURE — 2500000001 HC RX 250 WO HCPCS SELF ADMINISTERED DRUGS (ALT 637 FOR MEDICARE OP): Performed by: SURGERY

## 2023-12-10 PROCEDURE — 36415 COLL VENOUS BLD VENIPUNCTURE: CPT

## 2023-12-10 PROCEDURE — 80048 BASIC METABOLIC PNL TOTAL CA: CPT

## 2023-12-10 PROCEDURE — 1100000001 HC PRIVATE ROOM DAILY

## 2023-12-10 PROCEDURE — 85025 COMPLETE CBC W/AUTO DIFF WBC: CPT

## 2023-12-10 PROCEDURE — 2500000005 HC RX 250 GENERAL PHARMACY W/O HCPCS

## 2023-12-10 RX ORDER — KETOROLAC TROMETHAMINE 30 MG/ML
30 INJECTION, SOLUTION INTRAMUSCULAR; INTRAVENOUS EVERY 6 HOURS SCHEDULED
Status: DISCONTINUED | OUTPATIENT
Start: 2023-12-10 | End: 2023-12-11 | Stop reason: HOSPADM

## 2023-12-10 RX ORDER — GABAPENTIN 300 MG/1
300 CAPSULE ORAL EVERY 8 HOURS SCHEDULED
Status: DISCONTINUED | OUTPATIENT
Start: 2023-12-10 | End: 2023-12-11 | Stop reason: HOSPADM

## 2023-12-10 RX ORDER — OXYCODONE HYDROCHLORIDE 10 MG/1
10 TABLET ORAL EVERY 4 HOURS PRN
Status: DISCONTINUED | OUTPATIENT
Start: 2023-12-10 | End: 2023-12-11 | Stop reason: HOSPADM

## 2023-12-10 RX ORDER — LIDOCAINE 560 MG/1
2 PATCH PERCUTANEOUS; TOPICAL; TRANSDERMAL DAILY
Status: DISCONTINUED | OUTPATIENT
Start: 2023-12-10 | End: 2023-12-11 | Stop reason: HOSPADM

## 2023-12-10 RX ORDER — OXYCODONE HYDROCHLORIDE 5 MG/1
5 TABLET ORAL EVERY 4 HOURS PRN
Status: DISCONTINUED | OUTPATIENT
Start: 2023-12-10 | End: 2023-12-11 | Stop reason: HOSPADM

## 2023-12-10 RX ADMIN — PANTOPRAZOLE SODIUM 40 MG: 40 TABLET, DELAYED RELEASE ORAL at 08:11

## 2023-12-10 RX ADMIN — LIDOCAINE 1 PATCH: 4 PATCH TOPICAL at 08:11

## 2023-12-10 RX ADMIN — HYDROMORPHONE HYDROCHLORIDE 0.2 MG: 0.2 INJECTION, SOLUTION INTRAMUSCULAR; INTRAVENOUS; SUBCUTANEOUS at 08:11

## 2023-12-10 RX ADMIN — KETOROLAC TROMETHAMINE 30 MG: 30 INJECTION, SOLUTION INTRAMUSCULAR at 17:50

## 2023-12-10 RX ADMIN — SODIUM CHLORIDE, SODIUM LACTATE, POTASSIUM CHLORIDE, CALCIUM CHLORIDE AND DEXTROSE MONOHYDRATE 50 ML/HR: 5; 600; 310; 30; 20 INJECTION, SOLUTION INTRAVENOUS at 21:34

## 2023-12-10 RX ADMIN — KETOROLAC TROMETHAMINE 30 MG: 30 INJECTION, SOLUTION INTRAMUSCULAR at 11:29

## 2023-12-10 RX ADMIN — ENOXAPARIN SODIUM 40 MG: 40 INJECTION SUBCUTANEOUS at 08:11

## 2023-12-10 RX ADMIN — GABAPENTIN 300 MG: 300 CAPSULE ORAL at 21:34

## 2023-12-10 RX ADMIN — OXYCODONE HYDROCHLORIDE 5 MG: 5 TABLET ORAL at 13:43

## 2023-12-10 RX ADMIN — HYDROMORPHONE HYDROCHLORIDE 0.2 MG: 0.2 INJECTION, SOLUTION INTRAMUSCULAR; INTRAVENOUS; SUBCUTANEOUS at 02:39

## 2023-12-10 RX ADMIN — GABAPENTIN 300 MG: 300 CAPSULE ORAL at 13:43

## 2023-12-10 ASSESSMENT — ENCOUNTER SYMPTOMS
PALPITATIONS: 0
SHORTNESS OF BREATH: 0
ABDOMINAL DISTENTION: 0
LIGHT-HEADEDNESS: 0
NUMBNESS: 0
HEADACHES: 0
DIZZINESS: 0
NAUSEA: 0
FATIGUE: 0
FEVER: 0
COUGH: 0
CHEST TIGHTNESS: 0
VOMITING: 0
ABDOMINAL PAIN: 1
CHILLS: 0

## 2023-12-10 ASSESSMENT — PAIN SCALES - GENERAL
PAINLEVEL_OUTOF10: 7
PAINLEVEL_OUTOF10: 2
PAINLEVEL_OUTOF10: 4
PAINLEVEL_OUTOF10: 2

## 2023-12-10 ASSESSMENT — COGNITIVE AND FUNCTIONAL STATUS - GENERAL
DAILY ACTIVITIY SCORE: 22
MOBILITY SCORE: 22
MOVING FROM LYING ON BACK TO SITTING ON SIDE OF FLAT BED WITH BEDRAILS: A LITTLE
MOVING FROM LYING ON BACK TO SITTING ON SIDE OF FLAT BED WITH BEDRAILS: A LITTLE
TURNING FROM BACK TO SIDE WHILE IN FLAT BAD: A LITTLE
HELP NEEDED FOR BATHING: A LITTLE
DRESSING REGULAR LOWER BODY CLOTHING: A LITTLE
HELP NEEDED FOR BATHING: A LITTLE
DAILY ACTIVITIY SCORE: 22
TURNING FROM BACK TO SIDE WHILE IN FLAT BAD: A LITTLE
MOBILITY SCORE: 22
DRESSING REGULAR LOWER BODY CLOTHING: A LITTLE

## 2023-12-10 ASSESSMENT — PAIN - FUNCTIONAL ASSESSMENT
PAIN_FUNCTIONAL_ASSESSMENT: 0-10

## 2023-12-10 ASSESSMENT — PAIN DESCRIPTION - LOCATION: LOCATION: ABDOMEN

## 2023-12-10 NOTE — CARE PLAN
The patient's goals for the shift include    Problem: Pain  Goal: My pain/discomfort is manageable  12/10/2023 0729 by Michelle Gibbs RN  Outcome: Progressing  12/10/2023 0728 by Michelle Gibbs RN  Outcome: Progressing     Problem: Safety  Goal: Patient will be injury free during hospitalization  12/10/2023 0729 by Michelle Gibbs RN  Outcome: Progressing  12/10/2023 0728 by Michelle Gibbs RN  Outcome: Progressing  Goal: I will remain free of falls  12/10/2023 0729 by Michelle Gibbs RN  Outcome: Progressing  12/10/2023 0728 by Michelle Gibbs RN  Outcome: Progressing     Problem: Skin  Goal: Decreased wound size/increased tissue granulation at next dressing change  12/10/2023 0729 by Michelle Gibbs RN  Outcome: Progressing  Flowsheets (Taken 12/10/2023 0729)  Decreased wound size/increased tissue granulation at next dressing change: Promote sleep for wound healing  12/10/2023 0728 by Michelle Gibbs RN  Outcome: Progressing  Goal: Participates in plan/prevention/treatment measures  12/10/2023 0729 by Michelle Gibbs RN  Outcome: Progressing  Flowsheets (Taken 12/10/2023 0729)  Participates in plan/prevention/treatment measures:   Elevate heels   Discuss with provider PT/OT consult  12/10/2023 0728 by Michelle Gibbs RN  Outcome: Progressing  Goal: Prevent/manage excess moisture  12/10/2023 0729 by Michelle Gibbs RN  Outcome: Progressing  Flowsheets (Taken 12/10/2023 0729)  Prevent/manage excess moisture: Monitor for/manage infection if present  12/10/2023 0728 by Michelle Gibbs RN  Outcome: Progressing  Goal: Prevent/minimize sheer/friction injuries  12/10/2023 0729 by Michelle Gibbs RN  Outcome: Progressing  Flowsheets (Taken 12/10/2023 0729)  Prevent/minimize sheer/friction injuries:   Turn/reposition every 2 hours/use positioning/transfer devices   Complete micro-shifts as needed if patient unable. Adjust patient  position to relieve pressure points, not a full turn  12/10/2023 0728 by Michelle Gibbs RN  Outcome: Progressing  Goal: Promote/optimize nutrition  12/10/2023 0729 by Michelle Gibbs RN  Outcome: Progressing  Flowsheets (Taken 12/10/2023 0729)  Promote/optimize nutrition: Monitor/record intake including meals  12/10/2023 0728 by Michelle Gibbs RN  Outcome: Progressing  Goal: Promote skin healing  12/10/2023 0729 by Michelle Gibbs RN  Outcome: Progressing  Flowsheets (Taken 12/10/2023 0729)  Promote skin healing:   Turn/reposition every 2 hours/use positioning/transfer devices   Assess skin/pad under line(s)/device(s)  12/10/2023 0728 by Michelle Gibbs RN  Outcome: Progressing

## 2023-12-10 NOTE — PROGRESS NOTES
"GENERAL SURGERY PROGRESS NOTE    Xochitl Pichardo   1965   [id number]     Xochitl Pichardo is a 57 y.o. female on day 3 of admission presenting with Colon cancer (CMS/HCC).    SIGMOID COLECTOMY *SDS* (ERAS PROTOCOL) on 12/7, 3 Days Post-Op    Subjective  PT VIOLA, NAEON, tolerating fld, no bowel function reported yet but having gas in ostomy bag, pain with ambulating, and reports burning along incision. Patient states no longer feels distended    Review of Systems:  Review of Systems   Constitutional:  Negative for chills, fatigue and fever.   Respiratory:  Negative for cough, chest tightness and shortness of breath.    Cardiovascular:  Negative for chest pain and palpitations.   Gastrointestinal:  Positive for abdominal pain. Negative for abdominal distention, nausea and vomiting.   Neurological:  Negative for dizziness, light-headedness, numbness and headaches.       Objective    Last Recorded Vitals  Blood pressure 123/79, pulse 75, temperature 36.8 °C (98.2 °F), temperature source Temporal, resp. rate 16, height 1.702 m (5' 7\"), weight 70.3 kg (155 lb), SpO2 96 %.    Intake/Output last 3 Shifts:  I/O last 3 completed shifts:  In: 768.7 (10.9 mL/kg) [I.V.:768.7 (10.9 mL/kg)]  Out: 4450 (63.3 mL/kg) [Urine:4450 (1.8 mL/kg/hr)]  Weight: 70.3 kg     Intake/Output Summary (Last 24 hours) at 12/10/2023 0925  Last data filed at 12/10/2023 0817  Gross per 24 hour   Intake 768.67 ml   Output 3975 ml   Net -3206.33 ml         Physical Exam  Vitals reviewed.   Constitutional:       General: She is not in acute distress.  HENT:      Head: Normocephalic.      Mouth/Throat:      Mouth: Mucous membranes are moist.      Pharynx: Oropharynx is clear.   Eyes:      Extraocular Movements: Extraocular movements intact.      Conjunctiva/sclera: Conjunctivae normal.   Cardiovascular:      Rate and Rhythm: Normal rate.      Pulses: Normal pulses.   Pulmonary:      Effort: Pulmonary effort is normal. No respiratory distress.   Abdominal:    "   Comments: Abdomen soft, non distended, incisional tenderness, no guarding, ostomy pink and patent w/ gas but no bowel sweat, post op dressings w/ shadowing on inferior third, intact   Skin:     General: Skin is warm and dry.   Neurological:      Mental Status: She is alert.         Relevant Results  Labs:  Results for orders placed or performed during the hospital encounter of 12/07/23 (from the past 24 hour(s))   Basic Metabolic Panel   Result Value Ref Range    Glucose 88 74 - 99 mg/dL    Sodium 139 136 - 145 mmol/L    Potassium 3.7 3.5 - 5.3 mmol/L    Chloride 105 98 - 107 mmol/L    Bicarbonate 30 21 - 32 mmol/L    Anion Gap 8 (L) 10 - 20 mmol/L    Urea Nitrogen 9 6 - 23 mg/dL    Creatinine 0.59 0.50 - 1.05 mg/dL    eGFR >90 >60 mL/min/1.73m*2    Calcium 9.1 8.6 - 10.3 mg/dL   CBC and Auto Differential   Result Value Ref Range    WBC 5.6 4.4 - 11.3 x10*3/uL    nRBC 0.0 0.0 - 0.0 /100 WBCs    RBC 3.70 (L) 4.00 - 5.20 x10*6/uL    Hemoglobin 10.9 (L) 12.0 - 16.0 g/dL    Hematocrit 33.9 (L) 36.0 - 46.0 %    MCV 92 80 - 100 fL    MCH 29.5 26.0 - 34.0 pg    MCHC 32.2 32.0 - 36.0 g/dL    RDW 13.5 11.5 - 14.5 %    Platelets 173 150 - 450 x10*3/uL    Neutrophils % 49.6 40.0 - 80.0 %    Immature Granulocytes %, Automated 0.2 0.0 - 0.9 %    Lymphocytes % 36.5 13.0 - 44.0 %    Monocytes % 8.7 2.0 - 10.0 %    Eosinophils % 4.3 0.0 - 6.0 %    Basophils % 0.7 0.0 - 2.0 %    Neutrophils Absolute 2.78 1.20 - 7.70 x10*3/uL    Immature Granulocytes Absolute, Automated 0.01 0.00 - 0.70 x10*3/uL    Lymphocytes Absolute 2.05 1.20 - 4.80 x10*3/uL    Monocytes Absolute 0.49 0.10 - 1.00 x10*3/uL    Eosinophils Absolute 0.24 0.00 - 0.70 x10*3/uL    Basophils Absolute 0.04 0.00 - 0.10 x10*3/uL       Images:  No orders to display       Assessment and Plan  Principal Problem:    Colon cancer (CMS/HCC)    57 y.o. female with colon mass s/p sigmoid resection and ostomy creation  - advance to low residual diet  - apply and maintain abdominal  binder for comfort  - OOB and ambulate  -PRN pain and nause control, will adjust regimen again to include Toradol and possibly gabapentin  - AM CBC and BMP stable    Discussed with attending Dr. Pete Benitez, DO - PGY2  General Surgery

## 2023-12-11 ENCOUNTER — PHARMACY VISIT (OUTPATIENT)
Dept: PHARMACY | Facility: CLINIC | Age: 58
End: 2023-12-11
Payer: MEDICARE

## 2023-12-11 VITALS
SYSTOLIC BLOOD PRESSURE: 133 MMHG | BODY MASS INDEX: 24.33 KG/M2 | HEART RATE: 74 BPM | WEIGHT: 155 LBS | HEIGHT: 67 IN | DIASTOLIC BLOOD PRESSURE: 83 MMHG | TEMPERATURE: 97.5 F | OXYGEN SATURATION: 95 % | RESPIRATION RATE: 18 BRPM

## 2023-12-11 PROCEDURE — 96372 THER/PROPH/DIAG INJ SC/IM: CPT | Performed by: SURGERY

## 2023-12-11 PROCEDURE — 2500000005 HC RX 250 GENERAL PHARMACY W/O HCPCS: Performed by: SURGERY

## 2023-12-11 PROCEDURE — 2500000004 HC RX 250 GENERAL PHARMACY W/ HCPCS (ALT 636 FOR OP/ED): Performed by: SURGERY

## 2023-12-11 PROCEDURE — 2500000001 HC RX 250 WO HCPCS SELF ADMINISTERED DRUGS (ALT 637 FOR MEDICARE OP): Performed by: SURGERY

## 2023-12-11 PROCEDURE — RXMED WILLOW AMBULATORY MEDICATION CHARGE

## 2023-12-11 RX ORDER — GABAPENTIN 300 MG/1
300 CAPSULE ORAL EVERY 8 HOURS SCHEDULED
Qty: 30 CAPSULE | Refills: 0 | Status: SHIPPED | OUTPATIENT
Start: 2023-12-11 | End: 2024-04-26 | Stop reason: HOSPADM

## 2023-12-11 RX ORDER — OXYCODONE HYDROCHLORIDE 5 MG/1
5 TABLET ORAL EVERY 6 HOURS PRN
Qty: 12 TABLET | Refills: 0 | Status: SHIPPED | OUTPATIENT
Start: 2023-12-11 | End: 2023-12-14

## 2023-12-11 RX ORDER — ACETAMINOPHEN 325 MG/1
1000 TABLET ORAL EVERY 8 HOURS PRN
Qty: 30 TABLET | Refills: 0 | Status: SHIPPED | OUTPATIENT
Start: 2023-12-11 | End: 2023-12-21

## 2023-12-11 RX ORDER — LIDOCAINE 560 MG/1
1 PATCH PERCUTANEOUS; TOPICAL; TRANSDERMAL DAILY
Qty: 30 PATCH | Refills: 0 | Status: SHIPPED | OUTPATIENT
Start: 2023-12-12 | End: 2024-01-11

## 2023-12-11 RX ADMIN — KETOROLAC TROMETHAMINE 30 MG: 30 INJECTION, SOLUTION INTRAMUSCULAR at 05:38

## 2023-12-11 RX ADMIN — LIDOCAINE 2 PATCH: 4 PATCH TOPICAL at 08:16

## 2023-12-11 RX ADMIN — KETOROLAC TROMETHAMINE 30 MG: 30 INJECTION, SOLUTION INTRAMUSCULAR at 12:13

## 2023-12-11 RX ADMIN — PANTOPRAZOLE SODIUM 40 MG: 40 TABLET, DELAYED RELEASE ORAL at 08:16

## 2023-12-11 RX ADMIN — GABAPENTIN 300 MG: 300 CAPSULE ORAL at 05:38

## 2023-12-11 RX ADMIN — ENOXAPARIN SODIUM 40 MG: 40 INJECTION SUBCUTANEOUS at 08:16

## 2023-12-11 RX ADMIN — OXYCODONE HYDROCHLORIDE 5 MG: 5 TABLET ORAL at 17:01

## 2023-12-11 RX ADMIN — KETOROLAC TROMETHAMINE 30 MG: 30 INJECTION, SOLUTION INTRAMUSCULAR at 00:14

## 2023-12-11 RX ADMIN — GABAPENTIN 300 MG: 300 CAPSULE ORAL at 14:00

## 2023-12-11 ASSESSMENT — PAIN SCALES - GENERAL
PAINLEVEL_OUTOF10: 1
PAINLEVEL_OUTOF10: 6
PAINLEVEL_OUTOF10: 3

## 2023-12-11 ASSESSMENT — COGNITIVE AND FUNCTIONAL STATUS - GENERAL
DAILY ACTIVITIY SCORE: 22
MOBILITY SCORE: 22
HELP NEEDED FOR BATHING: A LITTLE
TURNING FROM BACK TO SIDE WHILE IN FLAT BAD: A LITTLE
MOVING FROM LYING ON BACK TO SITTING ON SIDE OF FLAT BED WITH BEDRAILS: A LITTLE
DRESSING REGULAR LOWER BODY CLOTHING: A LITTLE

## 2023-12-11 NOTE — PROGRESS NOTES
I met with pt-plan remains to DC to home when cleared.  Pt states it may be later today.  No DC needs identified at this time.

## 2023-12-11 NOTE — CARE PLAN
The patient's goals for the shift include      The clinical goals for the shift include pain management        Problem: Pain  Goal: My pain/discomfort is manageable  Outcome: Progressing     Problem: Safety  Goal: Patient will be injury free during hospitalization  Outcome: Progressing  Goal: I will remain free of falls  Outcome: Progressing     Problem: Skin  Goal: Decreased wound size/increased tissue granulation at next dressing change  Outcome: Progressing  Flowsheets (Taken 12/11/2023 0740)  Decreased wound size/increased tissue granulation at next dressing change:   Promote sleep for wound healing   Protective dressings over bony prominences  Goal: Participates in plan/prevention/treatment measures  Outcome: Progressing  Flowsheets (Taken 12/11/2023 0740)  Participates in plan/prevention/treatment measures:   Discuss with provider PT/OT consult   Elevate heels  Goal: Prevent/manage excess moisture  Outcome: Progressing  Flowsheets (Taken 12/11/2023 0740)  Prevent/manage excess moisture:   Moisturize dry skin   Cleanse incontinence/protect with barrier cream  Goal: Prevent/minimize sheer/friction injuries  Outcome: Progressing  Flowsheets (Taken 12/11/2023 0740)  Prevent/minimize sheer/friction injuries:   Increase activity/out of bed for meals   Use pull sheet   HOB 30 degrees or less  Goal: Promote/optimize nutrition  Outcome: Progressing  Flowsheets (Taken 12/11/2023 0740)  Promote/optimize nutrition:   Assist with feeding   Consume > 50% meals/supplements  Goal: Promote skin healing  Outcome: Progressing  Flowsheets (Taken 12/11/2023 0740)  Promote skin healing:   Assess skin/pad under line(s)/device(s)   Turn/reposition every 2 hours/use positioning/transfer devices

## 2023-12-18 NOTE — DISCHARGE SUMMARY
Discharge Diagnosis  Colon cancer (CMS/HCC)    Issues Requiring Follow-Up  Sigmoid resection, still with transverse loop colostomy.    Test Results Pending At Discharge  Pending Labs       Order Current Status    Surgical Pathology Exam In process            Hospital Course   Pt admitted and underwent sigmoid resection.  Recovery remarkable for low pain tolerance.  Pt returned with good bowel function and was eventually discharged to home.     Pertinent Physical Exam At Time of Discharge  Physical Exam  Eyes:      Extraocular Movements: Extraocular movements intact.      Pupils: Pupils are equal, round, and reactive to light.   Cardiovascular:      Rate and Rhythm: Normal rate and regular rhythm.   Pulmonary:      Effort: Pulmonary effort is normal.   Abdominal:      Palpations: Abdomen is soft.   Skin:     General: Skin is warm and dry.   Neurological:      Mental Status: She is alert.   Psychiatric:         Mood and Affect: Mood normal.         Behavior: Behavior normal.         Home Medications     Medication List      START taking these medications     acetaminophen 325 mg tablet; Commonly known as: Tylenol; Take 3 tablets   (975 mg) by mouth every 8 hours if needed for mild pain (1 - 3) for up to   10 days.   gabapentin 300 mg capsule; Commonly known as: Neurontin; Take 1 capsule   (300 mg) by mouth every 8 hours for 10 days.   Lidocaine Pain Relief 4 % patch; Generic drug: lidocaine; Place 1 patch   over 12 hours on the skin once daily. Remove & discard patch within 12   hours or as directed by MD. Do not start before December 12, 2023.     CONTINUE taking these medications     multivitamin tablet     ASK your doctor about these medications     oxyCODONE 5 mg immediate release tablet; Commonly known as: Roxicodone;   Take 1 tablet (5 mg) by mouth every 6 hours if needed for moderate pain (4   - 6) or severe pain (7 - 10) for up to 3 days.; Ask about: Should I take   this medication?       Outpatient  Follow-Up  No future appointments.  To Follow up with me in office in 1 week.     Estrellita Nguyen MD

## 2024-01-03 PROCEDURE — 88341 IMHCHEM/IMCYTCHM EA ADD ANTB: CPT | Performed by: STUDENT IN AN ORGANIZED HEALTH CARE EDUCATION/TRAINING PROGRAM

## 2024-01-03 PROCEDURE — 88342 IMHCHEM/IMCYTCHM 1ST ANTB: CPT | Performed by: STUDENT IN AN ORGANIZED HEALTH CARE EDUCATION/TRAINING PROGRAM

## 2024-01-04 NOTE — DOCUMENTATION CLARIFICATION NOTE
PATIENT:               JOS HAMEED  ACCT #:                  [id number]  MRN:                       [id number]  :                       1965  ADMIT DATE:       2023 11:27 AM  DISCH DATE:        2023 5:06 PM  RESPONDING PROVIDER #:        [id number]          PROVIDER RESPONSE TEXT:    I concur with the pathology report findings and they are clinically significant    CDI QUERY TEXT:    [facility]_Path Results Simple        Instruction:    Based on your assessment of the patient and the clinical information, please provide the requested documentation by clicking on the appropriate radio button and enter any additional information if prompted.    Question: Please document whether you concur or do not concur with the pathology report findings    When answering this query, please exercise your independent professional judgment. The fact that a question is being asked, does not imply that any particular answer is desired or expected.    The patient's clinical indicators include:  Clinical Information:  58 yo female with sigmoid mass    Clinical Indicators and Pathology Findings:  1/3 FINAL DIAGNOSIS  A. SIGMOID COLON, SEGMENTAL RESECTION:  -- INVASIVE ADENOCARCINOMA OF COLON, SEE SYNOPTIC REPORT  -- TATTOO AREA PRESENT, CONSISTENT WITH PRIOR BIOPSY SITE  -- TUBULOVILLOUS ADENOMA      Treatment:  sigmoid colectomy    Risk Factors: Ovarian cyst , sigmoid mass  Options provided:  -- I concur with the pathology report findings and they are clinically significant  -- I do not concur with the pathology report findings  -- Other - I will add my own diagnosis  -- Refer to Clinical Documentation Reviewer    Query created by: Velma Rai on 2024 10:03 AM      Electronically signed by:  BETHANY RAMIREZ MD 2024 1:42 PM

## 2024-01-08 LAB
LAB AP ASR DISCLAIMER: NORMAL
LABORATORY COMMENT REPORT: NORMAL
PATH REPORT.ADDENDUM SPEC: NORMAL
PATH REPORT.FINAL DX SPEC: NORMAL
PATH REPORT.GROSS SPEC: NORMAL
PATH REPORT.RELEVANT HX SPEC: NORMAL
PATH REPORT.TOTAL CANCER: NORMAL
PATHOLOGY SYNOPTIC REPORT: NORMAL

## 2024-01-09 PROBLEM — L40.9 PSORIASIS: Status: ACTIVE | Noted: 2024-01-09

## 2024-01-09 PROBLEM — R53.81 MALAISE AND FATIGUE: Status: ACTIVE | Noted: 2024-01-09

## 2024-01-09 PROBLEM — R10.814 ABDOMINAL TENDERNESS OF LEFT LOWER QUADRANT: Status: ACTIVE | Noted: 2024-01-09

## 2024-01-09 PROBLEM — R53.83 MALAISE AND FATIGUE: Status: ACTIVE | Noted: 2024-01-09

## 2024-01-09 PROBLEM — K92.1 BLOOD IN STOOL: Status: ACTIVE | Noted: 2024-01-09

## 2024-01-09 RX ORDER — MAGNESIUM CHLORIDE 64 MG
64 TABLET, DELAYED RELEASE (ENTERIC COATED) ORAL
COMMUNITY

## 2024-01-09 RX ORDER — LEVOTHYROXINE SODIUM 25 UG/1
25 TABLET ORAL DAILY
COMMUNITY
Start: 2022-11-18

## 2024-01-09 RX ORDER — ERYTHROMYCIN 500 MG/1
500 TABLET, COATED ORAL
COMMUNITY
Start: 2023-11-07

## 2024-01-11 ENCOUNTER — OFFICE VISIT (OUTPATIENT)
Dept: HEMATOLOGY/ONCOLOGY | Facility: CLINIC | Age: 59
End: 2024-01-11
Payer: COMMERCIAL

## 2024-01-11 VITALS
TEMPERATURE: 97.5 F | RESPIRATION RATE: 16 BRPM | OXYGEN SATURATION: 96 % | DIASTOLIC BLOOD PRESSURE: 77 MMHG | BODY MASS INDEX: 24.91 KG/M2 | HEART RATE: 98 BPM | HEIGHT: 67 IN | WEIGHT: 158.73 LBS | SYSTOLIC BLOOD PRESSURE: 120 MMHG

## 2024-01-11 DIAGNOSIS — C18.7 CANCER OF SIGMOID COLON (MULTI): Primary | ICD-10-CM

## 2024-01-11 PROCEDURE — 99215 OFFICE O/P EST HI 40 MIN: CPT | Performed by: INTERNAL MEDICINE

## 2024-01-11 PROCEDURE — 99205 OFFICE O/P NEW HI 60 MIN: CPT | Performed by: INTERNAL MEDICINE

## 2024-01-11 PROCEDURE — 1036F TOBACCO NON-USER: CPT | Performed by: INTERNAL MEDICINE

## 2024-01-11 ASSESSMENT — PAIN SCALES - GENERAL: PAINLEVEL: 0-NO PAIN

## 2024-01-11 ASSESSMENT — PATIENT HEALTH QUESTIONNAIRE - PHQ9
2. FEELING DOWN, DEPRESSED OR HOPELESS: NOT AT ALL
1. LITTLE INTEREST OR PLEASURE IN DOING THINGS: NOT AT ALL
SUM OF ALL RESPONSES TO PHQ9 QUESTIONS 1 AND 2: 0

## 2024-01-11 NOTE — PROGRESS NOTES
Xochitl Pichardo is a 58 y.o. female evaluated for colon cancer of sigmoid colon.      Subjective   Patient is a 58-year-old female who has a history of left lower abdominal pain and CAT scan of abdominal pelvis done in December 2022 patient did show a circumferential masslike thickening of the short segment of sigmoid colon.    December 14, 2022 colonoscopy done and an obstructing sigmoid colon mass was seen biopsy nondiagnostic patient was offered surgical resection and patient refused at that time.   In October 2023, patient  presented with an obstructing sigmoid colon mass.  Patient was taken to the operating room for decompression/diversion.  Patient underwent diverting loop transverse colostomy.  October 13, 2023, CAT scan of abdominal pelvis done which did show Interval development of colonic obstruction with diffuse dilatation of the colon and transition point in the sigmoid colon at site of previously described colonic mass concerning for colonic malignancy as described on the 12/6/2022 CT examination. Interval increase in size of 5.5 cm x 3.4 center cystic lesion in the left posterior pelvis;   December 7, 2023, status post sigmoid colectomy and final pathology did show moderately differentiated adenocarcinoma, size 4 x 3.3 cm,Weight: 72 kg (158 lb 11.7 oz)   Throat muscularis propria into pericolonic tissue, no evidence of lymphovascular and perineural invasion, margins negative, 20 lymph node examined and all negative for metastatic disease.    Stage IIa, pT3 pN0 M0.  MISMATCH REPAIR PROTEIN EXPRESSION  Block A4                 Protein:  Result                    MLH-1:             Expression Present                                                       PMS-2:             Expression Present                                                       MSH-2:             Expression Present                                                       MSH-6:             Expression Present                                            INTERPRETATION: Neoplasm with normal mismatch repair protein expression.  Medical oncology consultation    Objective   Postoperatively patient doing very well, no nausea vomiting no abdominal pain, colostomy is working very well, patient is active no weight loss.    Family history negative for colon cancer and uterine cancer.    ROS  Unremarkable    Past Medical History:   Diagnosis Date    Hypothyroidism     Other specified disorders of the skin and subcutaneous tissue 04/13/2019    Morgellons syndrome    Other specified disorders of the skin and subcutaneous tissue 04/23/2019    Morgellons syndrome      Past Surgical History:   Procedure Laterality Date    COLONOSCOPY      OTHER SURGICAL HISTORY  10/14/2023    loop ostomy        Family History   Problem Relation Name Age of Onset    Parkinsonism Mother      Heart disease Father      Heart disease Brother      Heart disease Paternal Grandfather        Social History     Tobacco Use   Smoking Status Never   Smokeless Tobacco Never        Physical Exam  Vitals reviewed.   Constitutional:       Appearance: Normal appearance.   HENT:      Head: Normocephalic and atraumatic.      Nose: Nose normal.   Eyes:      Extraocular Movements: Extraocular movements intact.      Conjunctiva/sclera: Conjunctivae normal.      Pupils: Pupils are equal, round, and reactive to light.   Cardiovascular:      Rate and Rhythm: Normal rate and regular rhythm.   Pulmonary:      Effort: Pulmonary effort is normal.      Breath sounds: Normal breath sounds.   Abdominal:      General: Abdomen is flat.      Palpations: Abdomen is soft.      Comments: Status post colostomy, no any masses noted   Musculoskeletal:         General: Normal range of motion.      Cervical back: Normal range of motion and neck supple.   Lymphadenopathy:      Comments: No peripheral lymphadenopathy   Skin:     General: Skin is warm and dry.   Neurological:      General: No focal deficit present.      Mental Status:  "She is alert and oriented to person, place, and time.   Psychiatric:         Mood and Affect: Mood normal.           Current Outpatient Medications:     cyanocobalamin (Vitamin B-12) 50 mcg tablet, Take 1 tablet (50 mcg) by mouth once daily., Disp: , Rfl:     erythromycin base (E-Mycin) 500 mg tablet, 1 tablet (500 mg)., Disp: , Rfl:     gabapentin (Neurontin) 300 mg capsule, Take 1 capsule (300 mg) by mouth every 8 hours for 10 days., Disp: 30 capsule, Rfl: 0    levothyroxine (Synthroid, Levoxyl) 25 mcg tablet, Take 1 tablet (25 mcg) by mouth once daily., Disp: , Rfl:     lidocaine 4 % patch, Place 1 patch over 12 hours on the skin once daily. Remove & discard patch within 12 hours or as directed by MD. Do not start before December 12, 2023., Disp: 30 patch, Rfl: 0    magnesium chloride (MagDelay) 64 mg EC tablet, Take 1 tablet (64 mg) by mouth., Disp: , Rfl:     multivitamin tablet, Take 1 tablet by mouth once daily., Disp: , Rfl:     Current Facility-Administered Medications:     dextrose 5 % and lactated Ringer's infusion, 100 mL/hr, intravenous, Continuous, Estrellita Nguyen MD      Last Recorded Vitals  Blood pressure 120/77, pulse 98, temperature 36.4 °C (97.5 °F), resp. rate 16, height (S) 1.704 m (5' 7.09\"), weight 72 kg (158 lb 11.7 oz), SpO2 96 %.     Relevant Results            Lab Results   Component Value Date    WBC 5.6 12/10/2023    HGB 10.9 (L) 12/10/2023    HCT 33.9 (L) 12/10/2023     12/10/2023    ALT 11 12/05/2023    AST 13 12/05/2023     12/10/2023    K 3.7 12/10/2023     12/10/2023    CREATININE 0.59 12/10/2023    BUN 9 12/10/2023    CO2 30 12/10/2023    TSH 5.05 (H) 11/15/2022      Assessment/Plan   #1, sigmoid colon cancer ,moderately differentiated adenocarcinoma, size 4 x 3.3 cm,    Throat muscularis propria into pericolonic tissue, no evidence of lymphovascular and perineural invasion, margins negative, 20 lymph node examined and all negative for metastatic disease.    " Stage IIa, pT3 pN0 M0.   MISMATCH REPAIR PROTEIN EXPRESSION   Block A4                  Protein:  Result                     MLH-1:             Expression Present                                                        PMS-2:             Expression Present                                                        MSH-2:             Expression Present                                                        MSH-6:             Expression Present                                            Postoperatively patient is doing very well, colostomy is working very well, pathology report discussed with the patient and patient has stage II disease, T3 N0 M0.  The role of adjuvant chemotherapy in this situation is not well defined.  Patient has most of good prognostic factor including patient has history of colonic mass more than 1 year is some risk for Micrometastatic disease.  Preoperative CEA level was not elevated this is also good prognostic factor.  For further evaluation I will schedule for whole-body PET scan and patient will be reevaluated after PET scan to review and make a final decision.      I spent 60 minutes in the professional and overall care of this patient.    Celsa Lepe MD

## 2024-01-25 ENCOUNTER — HOSPITAL ENCOUNTER (OUTPATIENT)
Dept: RADIOLOGY | Facility: HOSPITAL | Age: 59
Discharge: HOME | End: 2024-01-25
Payer: COMMERCIAL

## 2024-01-25 DIAGNOSIS — C18.7 CANCER OF SIGMOID COLON (MULTI): ICD-10-CM

## 2024-01-25 PROCEDURE — A9552 F18 FDG: HCPCS | Performed by: INTERNAL MEDICINE

## 2024-01-25 PROCEDURE — 78815 PET IMAGE W/CT SKULL-THIGH: CPT | Mod: PI

## 2024-01-25 PROCEDURE — 78815 PET IMAGE W/CT SKULL-THIGH: CPT | Mod: PET TUMOR INIT TX STRAT | Performed by: RADIOLOGY

## 2024-01-25 PROCEDURE — 3430000001 HC RX 343 DIAGNOSTIC RADIOPHARMACEUTICALS: Performed by: INTERNAL MEDICINE

## 2024-01-25 RX ORDER — FLUDEOXYGLUCOSE F 18 200 MCI/ML
12.2 INJECTION, SOLUTION INTRAVENOUS
Status: COMPLETED | OUTPATIENT
Start: 2024-01-25 | End: 2024-01-25

## 2024-01-25 RX ADMIN — FLUDEOXYGLUCOSE F 18 12.2 MILLICURIE: 200 INJECTION, SOLUTION INTRAVENOUS at 13:15

## 2024-01-30 ENCOUNTER — OFFICE VISIT (OUTPATIENT)
Dept: HEMATOLOGY/ONCOLOGY | Facility: CLINIC | Age: 59
End: 2024-01-30
Payer: COMMERCIAL

## 2024-01-30 VITALS
TEMPERATURE: 97.7 F | OXYGEN SATURATION: 97 % | DIASTOLIC BLOOD PRESSURE: 87 MMHG | WEIGHT: 160.91 LBS | HEIGHT: 67 IN | RESPIRATION RATE: 16 BRPM | BODY MASS INDEX: 25.26 KG/M2 | SYSTOLIC BLOOD PRESSURE: 125 MMHG | HEART RATE: 82 BPM

## 2024-01-30 DIAGNOSIS — C18.7 CANCER OF SIGMOID COLON (MULTI): ICD-10-CM

## 2024-01-30 PROCEDURE — 99215 OFFICE O/P EST HI 40 MIN: CPT | Performed by: INTERNAL MEDICINE

## 2024-01-30 PROCEDURE — 1036F TOBACCO NON-USER: CPT | Performed by: INTERNAL MEDICINE

## 2024-01-30 ASSESSMENT — PATIENT HEALTH QUESTIONNAIRE - PHQ9
1. LITTLE INTEREST OR PLEASURE IN DOING THINGS: NOT AT ALL
SUM OF ALL RESPONSES TO PHQ9 QUESTIONS 1 AND 2: 0
2. FEELING DOWN, DEPRESSED OR HOPELESS: NOT AT ALL

## 2024-01-30 ASSESSMENT — PAIN SCALES - GENERAL: PAINLEVEL: 0-NO PAIN

## 2024-01-30 NOTE — PROGRESS NOTES
Xochitl Pichardo is a 58 y.o. female evaluated for colon cancer of sigmoid colon.  Oncology history, treatment  #1 colon cancer,   Stage IIa, pT3 pN0 M0.  MISMATCH REPAIR PROTEIN EXPRESSION  Block A4                 Protein:  Result                    MLH-1:             Expression Present                                                       PMS-2:             Expression Present                                                       MSH-2:             Expression Present                                                       MSH-6:             Expression Present    December 7, 2023, status post sigmoid colectomy and final pathology did show moderately differentiated adenocarcinoma, size 4 x 3.3 cm,     tumor was extended pericolonic tissue, no evidence of lymphovascular and perineural invasion, margins negative, 20 lymph node examined and all negative for metastatic disease.  1/25/24 , PET , 1. Postsurgical changes status post sigmoid colectomy without evidence of FDG avid local recurrence or metastatic disease. 2. Diffusely enlarged thyroid gland with intense FDG uptake which may be seen in the setting of thyroiditis.  Clinically follow-up         Subjective   Patient is a 58-year-old female who has a history of left lower abdominal pain and CAT scan of abdominal pelvis done in December 2022 patient did show a circumferential masslike thickening of the short segment of sigmoid colon.    December 14, 2022 colonoscopy done and an obstructing sigmoid colon mass was seen biopsy nondiagnostic patient was offered surgical resection and patient refused at that time.   In October 2023, patient  presented with an obstructing sigmoid colon mass.  Patient was taken to the operating room for decompression/diversion.  Patient underwent diverting loop transverse colostomy.  October 13, 2023, CAT scan of abdominal pelvis done which did show Interval development of colonic obstruction with diffuse dilatation of the colon and transition  point in the sigmoid colon at site of previously described colonic mass concerning for colonic malignancy as described on the 12/6/2022 CT examination. Interval increase in size of 5.5 cm x 3.4 center cystic lesion in the left posterior pelvis;   December 7, 2023, status post sigmoid colectomy and final pathology did show moderately differentiated adenocarcinoma, size 4 x 3.3 cm,     tumor was extended pericolonic tissue, no evidence of lymphovascular and perineural invasion, margins negative, 20 lymph node examined and all negative for metastatic disease.    Stage IIa, pT3 pN0 M0.  MISMATCH REPAIR PROTEIN EXPRESSION  Block A4                 Protein:  Result                    MLH-1:             Expression Present                                                       PMS-2:             Expression Present                                                       MSH-2:             Expression Present                                                       MSH-6:             Expression Present                                           INTERPRETATION: Neoplasm with normal mismatch repair protein expression.  Medical oncology consultation    Objective   Postoperatively patient doing very well, no nausea vomiting no abdominal pain, colostomy is working very well, patient is active no weight loss.  PET scan result discussed with the patient no evidence of degenerative disease    Family history negative for colon cancer and uterine cancer.    ROS  Unremarkable    Past Medical History:   Diagnosis Date    Hypothyroidism     Other specified disorders of the skin and subcutaneous tissue 04/13/2019    Morgellons syndrome    Other specified disorders of the skin and subcutaneous tissue 04/23/2019    Morgellons syndrome      Past Surgical History:   Procedure Laterality Date    COLONOSCOPY      OTHER SURGICAL HISTORY  10/14/2023    loop ostomy        Family History   Problem Relation Name Age of Onset    Parkinsonism Mother      Heart  disease Father      Heart disease Brother      Heart disease Paternal Grandfather        Social History     Tobacco Use   Smoking Status Never   Smokeless Tobacco Never        Physical Exam  Vitals reviewed.   Constitutional:       Appearance: Normal appearance.   HENT:      Head: Normocephalic and atraumatic.      Nose: Nose normal.   Eyes:      Extraocular Movements: Extraocular movements intact.      Conjunctiva/sclera: Conjunctivae normal.      Pupils: Pupils are equal, round, and reactive to light.   Cardiovascular:      Rate and Rhythm: Normal rate and regular rhythm.   Pulmonary:      Effort: Pulmonary effort is normal.      Breath sounds: Normal breath sounds.   Abdominal:      General: Abdomen is flat.      Palpations: Abdomen is soft.      Comments: Status post colostomy, no any masses noted   Musculoskeletal:         General: Normal range of motion.      Cervical back: Normal range of motion and neck supple.   Lymphadenopathy:      Comments: No peripheral lymphadenopathy   Skin:     General: Skin is warm and dry.   Neurological:      General: No focal deficit present.      Mental Status: She is alert and oriented to person, place, and time.   Psychiatric:         Mood and Affect: Mood normal.           Current Outpatient Medications:     cyanocobalamin (Vitamin B-12) 50 mcg tablet, Take 1 tablet (50 mcg) by mouth once daily., Disp: , Rfl:     erythromycin base (E-Mycin) 500 mg tablet, 1 tablet (500 mg)., Disp: , Rfl:     gabapentin (Neurontin) 300 mg capsule, Take 1 capsule (300 mg) by mouth every 8 hours for 10 days., Disp: 30 capsule, Rfl: 0    levothyroxine (Synthroid, Levoxyl) 25 mcg tablet, Take 1 tablet (25 mcg) by mouth once daily., Disp: , Rfl:     magnesium chloride (MagDelay) 64 mg EC tablet, Take 1 tablet (64 mg) by mouth., Disp: , Rfl:     multivitamin tablet, Take 1 tablet by mouth once daily., Disp: , Rfl:     Current Facility-Administered Medications:     dextrose 5 % and lactated Ringer's  "infusion, 100 mL/hr, intravenous, Continuous, Estrellita Nguyen MD      Last Recorded Vitals  Resp. rate 16, height 1.704 m (5' 7.09\").     Relevant Results            Lab Results   Component Value Date    WBC 5.6 12/10/2023    HGB 10.9 (L) 12/10/2023    HCT 33.9 (L) 12/10/2023     12/10/2023    ALT 11 12/05/2023    AST 13 12/05/2023     12/10/2023    K 3.7 12/10/2023     12/10/2023    CREATININE 0.59 12/10/2023    BUN 9 12/10/2023    CO2 30 12/10/2023    TSH 5.05 (H) 11/15/2022      Assessment/Plan   #1, sigmoid colon cancer ,moderately differentiated adenocarcinoma, size 4 x 3.3 cm,    Throat muscularis propria into pericolonic tissue, no evidence of lymphovascular and perineural invasion, margins negative, 20 lymph node examined and all negative for metastatic disease.    Stage IIa, pT3 pN0 M0.   MISMATCH REPAIR PROTEIN EXPRESSION   Block A4                  Protein:  Result                     MLH-1:             Expression Present                                                        PMS-2:             Expression Present                                                        MSH-2:             Expression Present                                                        MSH-6:             Expression Present                                          1/25/24 , PET , no evidence of metastatic disease  Postoperatively patient is doing very well, colostomy is working very well, pathology report discussed with the patient and patient has stage II disease, T3 N0 M0.  The role of adjuvant chemotherapy in this situation is not well defined.   PET scan result discussed with the patient.  After long discussion patient decided to be followed clinically, no adjuvant chemotherapy, patient will be reevaluated after 6 months.  On next visit we will repeat CAT scan of chest abdominal pelvis.    Follow-up with surgery for possible reversal colostomy.        Celsa Lepe MD  "

## 2024-03-08 ENCOUNTER — HOSPITAL ENCOUNTER (OUTPATIENT)
Dept: RADIOLOGY | Facility: HOSPITAL | Age: 59
Discharge: HOME | End: 2024-03-08
Payer: COMMERCIAL

## 2024-03-08 DIAGNOSIS — N83.8 PARATUBAL CYST: ICD-10-CM

## 2024-03-08 PROCEDURE — 76830 TRANSVAGINAL US NON-OB: CPT

## 2024-03-21 ENCOUNTER — PRE-ADMISSION TESTING (OUTPATIENT)
Dept: PREADMISSION TESTING | Facility: HOSPITAL | Age: 59
End: 2024-03-21
Payer: COMMERCIAL

## 2024-03-21 ENCOUNTER — ANESTHESIA EVENT (OUTPATIENT)
Dept: OPERATING ROOM | Facility: HOSPITAL | Age: 59
DRG: 330 | End: 2024-03-21
Payer: COMMERCIAL

## 2024-03-21 VITALS
BODY MASS INDEX: 26.37 KG/M2 | RESPIRATION RATE: 16 BRPM | WEIGHT: 174 LBS | OXYGEN SATURATION: 96 % | HEART RATE: 108 BPM | SYSTOLIC BLOOD PRESSURE: 114 MMHG | TEMPERATURE: 97.8 F | HEIGHT: 68 IN | DIASTOLIC BLOOD PRESSURE: 70 MMHG

## 2024-03-21 DIAGNOSIS — Z01.818 ENCOUNTER FOR PREADMISSION TESTING: Primary | ICD-10-CM

## 2024-03-21 LAB
ANION GAP SERPL CALC-SCNC: 13 MMOL/L (ref 10–20)
BUN SERPL-MCNC: 24 MG/DL (ref 6–23)
CALCIUM SERPL-MCNC: 9.7 MG/DL (ref 8.6–10.3)
CHLORIDE SERPL-SCNC: 106 MMOL/L (ref 98–107)
CO2 SERPL-SCNC: 25 MMOL/L (ref 21–32)
CREAT SERPL-MCNC: 0.79 MG/DL (ref 0.5–1.05)
EGFRCR SERPLBLD CKD-EPI 2021: 87 ML/MIN/1.73M*2
ERYTHROCYTE [DISTWIDTH] IN BLOOD BY AUTOMATED COUNT: 13.6 % (ref 11.5–14.5)
GLUCOSE SERPL-MCNC: 89 MG/DL (ref 74–99)
HCT VFR BLD AUTO: 42.5 % (ref 36–46)
HGB BLD-MCNC: 14.2 G/DL (ref 12–16)
MCH RBC QN AUTO: 29.2 PG (ref 26–34)
MCHC RBC AUTO-ENTMCNC: 33.4 G/DL (ref 32–36)
MCV RBC AUTO: 87 FL (ref 80–100)
NRBC BLD-RTO: 0 /100 WBCS (ref 0–0)
PLATELET # BLD AUTO: 284 X10*3/UL (ref 150–450)
POTASSIUM SERPL-SCNC: 3.6 MMOL/L (ref 3.5–5.3)
RBC # BLD AUTO: 4.86 X10*6/UL (ref 4–5.2)
SODIUM SERPL-SCNC: 140 MMOL/L (ref 136–145)
WBC # BLD AUTO: 9.4 X10*3/UL (ref 4.4–11.3)

## 2024-03-21 PROCEDURE — 36415 COLL VENOUS BLD VENIPUNCTURE: CPT

## 2024-03-21 PROCEDURE — 85027 COMPLETE CBC AUTOMATED: CPT

## 2024-03-21 PROCEDURE — 82374 ASSAY BLOOD CARBON DIOXIDE: CPT

## 2024-03-21 PROCEDURE — 99213 OFFICE O/P EST LOW 20 MIN: CPT | Performed by: NURSE PRACTITIONER

## 2024-03-21 RX ORDER — FAMOTIDINE 10 MG/ML
20 INJECTION INTRAVENOUS ONCE
Status: CANCELLED | OUTPATIENT
Start: 2024-03-21 | End: 2024-03-21

## 2024-03-21 RX ORDER — ACETAMINOPHEN 325 MG/1
975 TABLET ORAL ONCE
Status: CANCELLED | OUTPATIENT
Start: 2024-03-27

## 2024-03-21 RX ORDER — SODIUM CHLORIDE, SODIUM LACTATE, POTASSIUM CHLORIDE, CALCIUM CHLORIDE 600; 310; 30; 20 MG/100ML; MG/100ML; MG/100ML; MG/100ML
50 INJECTION, SOLUTION INTRAVENOUS CONTINUOUS
Status: CANCELLED | OUTPATIENT
Start: 2024-03-27

## 2024-03-21 RX ORDER — SODIUM CITRATE AND CITRIC ACID MONOHYDRATE 334; 500 MG/5ML; MG/5ML
30 SOLUTION ORAL ONCE
Status: CANCELLED | OUTPATIENT
Start: 2024-03-21 | End: 2024-03-21

## 2024-03-21 RX ORDER — METOCLOPRAMIDE HYDROCHLORIDE 5 MG/ML
10 INJECTION INTRAMUSCULAR; INTRAVENOUS ONCE
Status: CANCELLED | OUTPATIENT
Start: 2024-03-21 | End: 2024-03-21

## 2024-03-21 RX ORDER — ONDANSETRON HYDROCHLORIDE 2 MG/ML
4 INJECTION, SOLUTION INTRAVENOUS ONCE
Status: CANCELLED | OUTPATIENT
Start: 2024-03-21 | End: 2024-03-21

## 2024-03-21 ASSESSMENT — CHADS2 SCORE
HYPERTENSION: NO
AGE GREATER THAN OR EQUAL TO 75: NO
CHADS2 SCORE: 0
CHF: NO
DIABETES: NO
PRIOR STROKE OR TIA OR THROMBOEMBOLISM: NO

## 2024-03-21 ASSESSMENT — ENCOUNTER SYMPTOMS
VOMITING: 0
CONSTIPATION: 0
EYES NEGATIVE: 1
RESPIRATORY NEGATIVE: 1
CARDIOVASCULAR NEGATIVE: 1
NEUROLOGICAL NEGATIVE: 1
NECK NEGATIVE: 1
NAUSEA: 0
ENDOCRINE NEGATIVE: 1
MUSCULOSKELETAL NEGATIVE: 1
ABDOMINAL PAIN: 0

## 2024-03-21 ASSESSMENT — LIFESTYLE VARIABLES: SMOKING_STATUS: NONSMOKER

## 2024-03-21 NOTE — CPM/PAT H&P
CPM/PAT Evaluation       Name: Xochitl Pichardo (Xochitl Pichardo)  /Age: 1965/58 y.o.     In-Person        Chief Complaint-    57 yo female presents for Pre-Admission testing for Scheduled 3/27/24 Loop Transverse Ostomy Closure.    HPI    Past Medical History:   Diagnosis Date    Hypothyroidism     Other specified disorders of the skin and subcutaneous tissue 2019    Morgellons syndrome    Other specified disorders of the skin and subcutaneous tissue 2019    Morgellons syndrome       Past Surgical History:   Procedure Laterality Date    COLONOSCOPY      OTHER SURGICAL HISTORY  10/14/2023    loop ostomy       Patient  reports being sexually active.    Family History   Problem Relation Name Age of Onset    Parkinsonism Mother      Heart disease Father      Heart disease Brother      Heart disease Paternal Grandfather         No Known Allergies    Prior to Admission medications    Medication Sig Start Date End Date Taking? Authorizing Provider   cyanocobalamin (Vitamin B-12) 50 mcg tablet Take 1 tablet (50 mcg) by mouth once daily.    Historical Provider, MD   erythromycin base (E-Mycin) 500 mg tablet 1 tablet (500 mg). 23   Historical Provider, MD   gabapentin (Neurontin) 300 mg capsule Take 1 capsule (300 mg) by mouth every 8 hours for 10 days. 23  Horace Ramirez,    levothyroxine (Synthroid, Levoxyl) 25 mcg tablet Take 1 tablet (25 mcg) by mouth once daily. 22   Historical Provider, MD   magnesium chloride (MagDelay) 64 mg EC tablet Take 1 tablet (64 mg) by mouth.    Historical Provider, MD   multivitamin tablet Take 1 tablet by mouth once daily.    Historical Provider, MD MCCALL ROS:   Constitutional:   Neuro/Psych:   neg    Eyes:   neg    Ears:    no hearing loss   tinnitus   no hearing aides  Nose:   neg    Mouth:   Throat:   Neck:   neg    Cardio:   neg    Respiratory:   neg    Endocrine:   neg    GI:    no abdominal pain   no constipation   no nausea   no  vomiting  :   neg    Musculoskeletal:   neg    Hematologic:    no history of blood transfusion   no blood clots  Skin:  neg        Physical Exam  Vitals and nursing note reviewed.   Constitutional:       Appearance: Normal appearance.   HENT:      Head: Normocephalic.      Right Ear: Tympanic membrane normal.      Left Ear: Tympanic membrane normal.      Mouth/Throat:      Mouth: Mucous membranes are moist.   Cardiovascular:      Rate and Rhythm: Normal rate and regular rhythm.   Pulmonary:      Effort: Pulmonary effort is normal.      Breath sounds: Normal breath sounds.   Abdominal:      General: Bowel sounds are normal.      Palpations: Abdomen is soft.      Tenderness: There is no right CVA tenderness or left CVA tenderness.   Musculoskeletal:      Cervical back: Normal range of motion and neck supple.   Skin:     General: Skin is warm and dry.   Neurological:      General: No focal deficit present.      Mental Status: She is alert.          PAT AIRWAY:    Front upper tooth cracked teeth and right lower bottom   upper dentures      There were no vitals taken for this visit.    DASI Risk Score    No data to display       Caprini DVT Assessment      Flowsheet Row Most Recent Value   DVT Score 3   Current Status Major surgery planned, including arthroscopic and laproscopic (1-2 hours)   BMI 30 or less          Modified Frailty Index    No data to display       CHADS2 Stroke Risk  Current as of 5 hours ago        N/A 3 - 100%: High Risk   2 - 3%: Medium Risk   0 - 2%: Low Risk     Last Change: N/A          This score determines the patient's risk of having a stroke if the patient has atrial fibrillation.        This score is not applicable to this patient. Components are not calculated.          Revised Cardiac Risk Index      Flowsheet Row Most Recent Value   Revised Cardiac Risk Calculator 1          Apfel Simplified Score      Flowsheet Row Most Recent Value   Apfel Simplified Score Calculator 2          Risk  Analysis Index Results This Encounter    No data found in the last 1 encounters.         Assessment and Plan:   CBC and BMP Completed 3/21/24  EKG completed 3/21/24  H&P Completed 3/21/24  Pre-Admission Screening Completed.  All questions addressed and answered.  Patient verbalized an understanding.

## 2024-03-21 NOTE — PREPROCEDURE INSTRUCTIONS
Medication List            Accurate as of March 21, 2024  2:34 PM. Always use your most recent med list.                cyanocobalamin 50 mcg tablet  Commonly known as: Vitamin B-12     erythromycin base 500 mg tablet  Commonly known as: E-Mycin     gabapentin 300 mg capsule  Commonly known as: Neurontin  Take 1 capsule (300 mg) by mouth every 8 hours for 10 days.     levothyroxine 25 mcg tablet  Commonly known as: Synthroid, Levoxyl     magnesium chloride 64 mg EC tablet  Commonly known as: MagDelay     multivitamin tablet                              NPO Instructions:  Nothing to eat or drink  Take antibiotics  as instructed  No medications morning of   No bowel prep   Stop multivitamin today    Additional Instructions:     Wear  comfortable loose fitting clothing  Do not use moisturizers, creams, lotions or perfume  All jewelry and valuables should be left at home      Shower morning of deodorant only  Call with any questions [phone]  Start using your incentive spirometer today

## 2024-03-25 ENCOUNTER — ANESTHESIA EVENT (OUTPATIENT)
Dept: GASTROENTEROLOGY | Facility: HOSPITAL | Age: 59
End: 2024-03-25
Payer: COMMERCIAL

## 2024-03-26 ENCOUNTER — APPOINTMENT (OUTPATIENT)
Dept: GASTROENTEROLOGY | Facility: HOSPITAL | Age: 59
End: 2024-03-26
Payer: COMMERCIAL

## 2024-03-26 ENCOUNTER — ANESTHESIA (OUTPATIENT)
Dept: GASTROENTEROLOGY | Facility: HOSPITAL | Age: 59
End: 2024-03-26
Payer: COMMERCIAL

## 2024-03-27 ENCOUNTER — ANESTHESIA (OUTPATIENT)
Dept: OPERATING ROOM | Facility: HOSPITAL | Age: 59
DRG: 330 | End: 2024-03-27
Payer: COMMERCIAL

## 2024-04-23 ENCOUNTER — HOSPITAL ENCOUNTER (OUTPATIENT)
Dept: GASTROENTEROLOGY | Facility: HOSPITAL | Age: 59
Discharge: HOME | End: 2024-04-23
Payer: COMMERCIAL

## 2024-04-23 VITALS
BODY MASS INDEX: 26.36 KG/M2 | SYSTOLIC BLOOD PRESSURE: 94 MMHG | HEART RATE: 70 BPM | RESPIRATION RATE: 14 BRPM | OXYGEN SATURATION: 96 % | TEMPERATURE: 97 F | DIASTOLIC BLOOD PRESSURE: 58 MMHG | HEIGHT: 68 IN | WEIGHT: 173.94 LBS

## 2024-04-23 DIAGNOSIS — D37.4 NEOPLASM OF UNCERTAIN BEHAVIOR OF SIGMOID COLON: Primary | ICD-10-CM

## 2024-04-23 PROCEDURE — 3700000002 HC GENERAL ANESTHESIA TIME - EACH INCREMENTAL 1 MINUTE

## 2024-04-23 PROCEDURE — 2500000005 HC RX 250 GENERAL PHARMACY W/O HCPCS: Performed by: LICENSED PRACTICAL NURSE

## 2024-04-23 PROCEDURE — 7100000010 HC PHASE TWO TIME - EACH INCREMENTAL 1 MINUTE

## 2024-04-23 PROCEDURE — 45378 DIAGNOSTIC COLONOSCOPY: CPT | Performed by: SURGERY

## 2024-04-23 PROCEDURE — 7100000009 HC PHASE TWO TIME - INITIAL BASE CHARGE

## 2024-04-23 PROCEDURE — 2500000004 HC RX 250 GENERAL PHARMACY W/ HCPCS (ALT 636 FOR OP/ED): Performed by: SURGERY

## 2024-04-23 PROCEDURE — 3700000001 HC GENERAL ANESTHESIA TIME - INITIAL BASE CHARGE

## 2024-04-23 PROCEDURE — 2500000004 HC RX 250 GENERAL PHARMACY W/ HCPCS (ALT 636 FOR OP/ED): Performed by: LICENSED PRACTICAL NURSE

## 2024-04-23 RX ORDER — PROPOFOL 10 MG/ML
INJECTION, EMULSION INTRAVENOUS AS NEEDED
Status: DISCONTINUED | OUTPATIENT
Start: 2024-04-23 | End: 2024-04-23

## 2024-04-23 RX ORDER — SODIUM CHLORIDE 9 MG/ML
20 INJECTION, SOLUTION INTRAVENOUS CONTINUOUS
Status: DISCONTINUED | OUTPATIENT
Start: 2024-04-23 | End: 2024-04-24 | Stop reason: HOSPADM

## 2024-04-23 RX ORDER — LIDOCAINE HYDROCHLORIDE 20 MG/ML
INJECTION, SOLUTION INFILTRATION; PERINEURAL AS NEEDED
Status: DISCONTINUED | OUTPATIENT
Start: 2024-04-23 | End: 2024-04-23

## 2024-04-23 RX ADMIN — PROPOFOL 50 MG: 10 INJECTION, EMULSION INTRAVENOUS at 09:58

## 2024-04-23 RX ADMIN — PROPOFOL 50 MG: 10 INJECTION, EMULSION INTRAVENOUS at 09:49

## 2024-04-23 RX ADMIN — PROPOFOL 50 MG: 10 INJECTION, EMULSION INTRAVENOUS at 09:53

## 2024-04-23 RX ADMIN — PROPOFOL 20 MG: 10 INJECTION, EMULSION INTRAVENOUS at 09:51

## 2024-04-23 RX ADMIN — LIDOCAINE HYDROCHLORIDE 20 MG: 20 INJECTION, SOLUTION INFILTRATION; PERINEURAL at 09:49

## 2024-04-23 RX ADMIN — PROPOFOL 50 MG: 10 INJECTION, EMULSION INTRAVENOUS at 10:01

## 2024-04-23 RX ADMIN — SODIUM CHLORIDE 20 ML/HR: 9 INJECTION, SOLUTION INTRAVENOUS at 09:45

## 2024-04-23 RX ADMIN — PROPOFOL 30 MG: 10 INJECTION, EMULSION INTRAVENOUS at 09:55

## 2024-04-23 SDOH — HEALTH STABILITY: MENTAL HEALTH: CURRENT SMOKER: 0

## 2024-04-23 ASSESSMENT — PAIN SCALES - GENERAL
PAINLEVEL_OUTOF10: 0 - NO PAIN
PAINLEVEL_OUTOF10: 0 - NO PAIN
PAIN_LEVEL: 0

## 2024-04-23 ASSESSMENT — COLUMBIA-SUICIDE SEVERITY RATING SCALE - C-SSRS
2. HAVE YOU ACTUALLY HAD ANY THOUGHTS OF KILLING YOURSELF?: NO
6. HAVE YOU EVER DONE ANYTHING, STARTED TO DO ANYTHING, OR PREPARED TO DO ANYTHING TO END YOUR LIFE?: NO
1. IN THE PAST MONTH, HAVE YOU WISHED YOU WERE DEAD OR WISHED YOU COULD GO TO SLEEP AND NOT WAKE UP?: NO

## 2024-04-23 ASSESSMENT — PAIN - FUNCTIONAL ASSESSMENT
PAIN_FUNCTIONAL_ASSESSMENT: 0-10
PAIN_FUNCTIONAL_ASSESSMENT: 0-10

## 2024-04-23 NOTE — ANESTHESIA POSTPROCEDURE EVALUATION
Patient: Xochitl Pichardo    Procedure Summary       Date: 04/23/24 Room / Location: [facility]    Anesthesia Start: 0947 Anesthesia Stop: 1012    Procedure: COLONOSCOPY Diagnosis:       Neoplasm of uncertain behavior of sigmoid colon      Neoplasm of uncertain behavior of sigmoid colon    Scheduled Providers: Estrellita Nguyen MD Responsible Provider: HENNA Brooks    Anesthesia Type: MAC ASA Status: 2            Anesthesia Type: MAC    Vitals Value Taken Time   BP 94/58 04/23/24 1006   Temp 36.1 °C (97 °F) 04/23/24 1006   Pulse 70 04/23/24 1006   Resp 14 04/23/24 1006   SpO2 96 % 04/23/24 1006       Anesthesia Post Evaluation    Patient location during evaluation: PACU  Patient participation: complete - patient participated  Level of consciousness: awake and alert  Pain score: 0  Pain management: adequate  Airway patency: patent  Cardiovascular status: acceptable  Respiratory status: acceptable  Hydration status: acceptable  Postoperative Nausea and Vomiting: none        There were no known notable events for this encounter.

## 2024-04-23 NOTE — DISCHARGE INSTRUCTIONS
Patient Instructions after a Colonoscopy      The anesthetics, sedatives or narcotics which were given to you today will be acting in your body for the next 24 hours, so you might feel a little sleepy or groggy.  This feeling should slowly wear off. Carefully read and follow the instructions.     You received sedation today:  - Do not drive or operate any machinery or power tools of any kind.   - No alcoholic beverages today, not even beer or wine.  - Do not make any important decisions or sign any legal documents.  - No over the counter medications that contain alcohol or that may cause drowsiness.  - Do not make any important decisions or sign any legal documents.    While it is common to experience mild to moderate abdominal distention, gas, or belching after your procedure, if any of these symptoms occur following discharge from the GI Lab or within one week of having your procedure, call the [facility] to be advised whether a visit to your nearest Urgent Care or Emergency Department is indicated.  Take this paper with you if you go.     - If you develop an allergic reaction to the medications that were given during your procedure such as difficulty breathing, rash, hives, severe nausea, vomiting or lightheadedness.  - If you experience chest pain, shortness of breath, severe abdominal pain, fevers and chills.  -If you develop signs and symptoms of bleeding such as blood in your spit, if your stools turn black, tarry, or bloody  - If you have not urinated within 8 hours following your procedure.  - If your IV site becomes painful, red, inflamed, or looks infected.    If you received a biopsy/polypectomy/sphincterotomy the following instructions apply below:    __ Do not use Aspirin containing products, non-steroidal medications or anti-coagulants for one week following your procedure. (Examples of these types of medications are: Advil, Arthrotec, Aleve, Coumadin, Ecotrin, Heparin, Ibuprofen,  Indocin, Motrin, Naprosyn, Nuprin, Plavix, Vioxx, and Voltarin, or their generic forms.  This list is not all-inclusive.  Check with your physician or pharmacist before resuming medications.)   __ Eat a soft diet today.  Avoid foods that are poorly digested for the next 24 hours.  These foods would include: nuts, beans, lettuce, red meats, and fried foods. Start with liquids and advance your diet as tolerated, gradually work up to eating solids.   __ Do not have a Barium Study or Enema for one week.    Your physician recommends the additional following instructions:    -You have a contact number available for emergencies. The signs and symptoms of potential delayed complications were discussed with you. You may return to normal activities tomorrow.  -Resume your previous diet.  -Continue your present medications.   -We are waiting for your pathology results.  -Your physician has recommended a repeat colonoscopy (date to be determined after pending pathology results are reviewed) for surveillance based on pathology results.  -The findings and recommendations have been discussed with you.  -The findings and recommendations were discussed with your family.  - Please see Medication Reconciliation Form for new medication/medications prescribed.       If you experience any problems or have any questions following discharge from the GI Lab, please call:        Nurse Signature                                                                        Date___________________                                                                            Patient/Responsible Party Signature                                        Date___________________

## 2024-04-23 NOTE — ANESTHESIA PREPROCEDURE EVALUATION
Patient: Xochitl Pichardo    Procedure Information       Date/Time: 04/23/24 1000    Scheduled providers: Estrellita Nguyen MD    Procedure: COLONOSCOPY    Location:  [facility]            Relevant Problems   Anesthesia (within normal limits)      GI   (+) Colon cancer (Multi)      Liver   (+) Colon cancer (Multi)      Endocrine   (+) Hypothyroid       Clinical information reviewed:   Tobacco  Allergies  Meds   Med Hx  Surg Hx  OB Status  Fam Hx  Soc   Hx        NPO Detail:  NPO/Void Status  Date of Last Liquid: 04/23/24  Time of Last Liquid: 0530  Date of Last Solid: 04/21/24  Time of Last Solid: 2100  Last Intake Type: Clear fluids         Physical Exam    Airway  Mallampati: II  TM distance: >3 FB  Neck ROM: full     Cardiovascular    Dental - normal exam     Pulmonary    Abdominal            Anesthesia Plan    History of general anesthesia?: yes  History of complications of general anesthesia?: no    ASA 2     MAC     The patient is not a current smoker.    intravenous induction   Anesthetic plan and risks discussed with patient.

## 2024-04-23 NOTE — H&P
"History Of Present Illness  Xochitl Pichardo is a 58 y.o. female presenting with history of sigmoid cancer with diverting colostomy here for colonoscopy .     Past Medical History  Past Medical History:   Diagnosis Date    Colon cancer (Multi)     Dental disease     chipped    Hypothyroidism     Murmur     Other specified disorders of the skin and subcutaneous tissue 04/13/2019    Morgellons syndrome    Other specified disorders of the skin and subcutaneous tissue 04/23/2019    Morgellons syndrome    Vision loss        Surgical History  Past Surgical History:   Procedure Laterality Date    COLONOSCOPY      OTHER SURGICAL HISTORY  10/14/2023    loop ostomy        Social History  She reports that she has never smoked. She has never used smokeless tobacco. She reports that she does not drink alcohol and does not use drugs.    Family History  Family History   Problem Relation Name Age of Onset    Parkinsonism Mother      Heart disease Father      Heart disease Brother      Heart disease Paternal Grandfather          Allergies  Patient has no known allergies.    Review of Systems     Physical Exam  Eyes:      Extraocular Movements: Extraocular movements intact.      Pupils: Pupils are equal, round, and reactive to light.   Cardiovascular:      Rate and Rhythm: Normal rate and regular rhythm.   Pulmonary:      Effort: Pulmonary effort is normal.   Abdominal:      Palpations: Abdomen is soft.   Skin:     General: Skin is warm and dry.   Neurological:      Mental Status: She is alert.   Psychiatric:         Mood and Affect: Mood normal.         Behavior: Behavior normal.          Last Recorded Vitals  Blood pressure 144/89, pulse 94, temperature 36.4 °C (97.6 °F), temperature source Temporal, resp. rate 20, height 1.715 m (5' 7.5\"), weight 78.9 kg (173 lb 15.1 oz), SpO2 96%.    Relevant Results             Assessment/Plan   Active Problems:  There are no active Hospital Problems.      Here for colonoscopy from ostomy.        I " spent 20 minutes in the professional and overall care of this patient.      Estrellita gNuyen MD

## 2024-04-24 ENCOUNTER — HOSPITAL ENCOUNTER (INPATIENT)
Facility: HOSPITAL | Age: 59
LOS: 2 days | Discharge: HOME | DRG: 330 | End: 2024-04-26
Attending: SURGERY | Admitting: SURGERY
Payer: COMMERCIAL

## 2024-04-24 DIAGNOSIS — C18.9 COLON CANCER (MULTI): Primary | ICD-10-CM

## 2024-04-24 LAB
ANION GAP SERPL CALC-SCNC: 16 MMOL/L (ref 10–20)
BUN SERPL-MCNC: 15 MG/DL (ref 6–23)
CALCIUM SERPL-MCNC: 9.2 MG/DL (ref 8.6–10.3)
CHLORIDE SERPL-SCNC: 106 MMOL/L (ref 98–107)
CO2 SERPL-SCNC: 17 MMOL/L (ref 21–32)
CREAT SERPL-MCNC: 0.76 MG/DL (ref 0.5–1.05)
EGFRCR SERPLBLD CKD-EPI 2021: >90 ML/MIN/1.73M*2
ERYTHROCYTE [DISTWIDTH] IN BLOOD BY AUTOMATED COUNT: 13.2 % (ref 11.5–14.5)
GLUCOSE SERPL-MCNC: 130 MG/DL (ref 74–99)
HCT VFR BLD AUTO: 45.8 % (ref 36–46)
HGB BLD-MCNC: 15.4 G/DL (ref 12–16)
MAGNESIUM SERPL-MCNC: 1.85 MG/DL (ref 1.6–2.4)
MCH RBC QN AUTO: 29.8 PG (ref 26–34)
MCHC RBC AUTO-ENTMCNC: 33.6 G/DL (ref 32–36)
MCV RBC AUTO: 89 FL (ref 80–100)
NRBC BLD-RTO: 0 /100 WBCS (ref 0–0)
PLATELET # BLD AUTO: 225 X10*3/UL (ref 150–450)
POTASSIUM SERPL-SCNC: 4.1 MMOL/L (ref 3.5–5.3)
RBC # BLD AUTO: 5.17 X10*6/UL (ref 4–5.2)
SODIUM SERPL-SCNC: 135 MMOL/L (ref 136–145)
WBC # BLD AUTO: 14.3 X10*3/UL (ref 4.4–11.3)

## 2024-04-24 PROCEDURE — 2500000004 HC RX 250 GENERAL PHARMACY W/ HCPCS (ALT 636 FOR OP/ED): Mod: JZ | Performed by: SURGERY

## 2024-04-24 PROCEDURE — 7100000001 HC RECOVERY ROOM TIME - INITIAL BASE CHARGE: Performed by: SURGERY

## 2024-04-24 PROCEDURE — 2500000005 HC RX 250 GENERAL PHARMACY W/O HCPCS: Performed by: LICENSED PRACTICAL NURSE

## 2024-04-24 PROCEDURE — 80048 BASIC METABOLIC PNL TOTAL CA: CPT | Performed by: SURGERY

## 2024-04-24 PROCEDURE — 7100000002 HC RECOVERY ROOM TIME - EACH INCREMENTAL 1 MINUTE: Performed by: SURGERY

## 2024-04-24 PROCEDURE — 85027 COMPLETE CBC AUTOMATED: CPT | Performed by: SURGERY

## 2024-04-24 PROCEDURE — 0DBL0ZZ EXCISION OF TRANSVERSE COLON, OPEN APPROACH: ICD-10-PCS | Performed by: SURGERY

## 2024-04-24 PROCEDURE — 2500000004 HC RX 250 GENERAL PHARMACY W/ HCPCS (ALT 636 FOR OP/ED): Performed by: LICENSED PRACTICAL NURSE

## 2024-04-24 PROCEDURE — 3600000003 HC OR TIME - INITIAL BASE CHARGE - PROCEDURE LEVEL THREE: Performed by: SURGERY

## 2024-04-24 PROCEDURE — 2720000007 HC OR 272 NO HCPCS: Performed by: SURGERY

## 2024-04-24 PROCEDURE — 3700000001 HC GENERAL ANESTHESIA TIME - INITIAL BASE CHARGE: Performed by: SURGERY

## 2024-04-24 PROCEDURE — 1100000001 HC PRIVATE ROOM DAILY

## 2024-04-24 PROCEDURE — 3600000008 HC OR TIME - EACH INCREMENTAL 1 MINUTE - PROCEDURE LEVEL THREE: Performed by: SURGERY

## 2024-04-24 PROCEDURE — 0DJD8ZZ INSPECTION OF LOWER INTESTINAL TRACT, VIA NATURAL OR ARTIFICIAL OPENING ENDOSCOPIC: ICD-10-PCS | Performed by: SURGERY

## 2024-04-24 PROCEDURE — 2500000004 HC RX 250 GENERAL PHARMACY W/ HCPCS (ALT 636 FOR OP/ED): Performed by: ANESTHESIOLOGY

## 2024-04-24 PROCEDURE — 2500000001 HC RX 250 WO HCPCS SELF ADMINISTERED DRUGS (ALT 637 FOR MEDICARE OP): Performed by: SURGERY

## 2024-04-24 PROCEDURE — 2500000004 HC RX 250 GENERAL PHARMACY W/ HCPCS (ALT 636 FOR OP/ED): Performed by: SURGERY

## 2024-04-24 PROCEDURE — 3700000002 HC GENERAL ANESTHESIA TIME - EACH INCREMENTAL 1 MINUTE: Performed by: SURGERY

## 2024-04-24 PROCEDURE — 2500000005 HC RX 250 GENERAL PHARMACY W/O HCPCS: Performed by: ANESTHESIOLOGY

## 2024-04-24 PROCEDURE — 36415 COLL VENOUS BLD VENIPUNCTURE: CPT | Performed by: SURGERY

## 2024-04-24 PROCEDURE — 83735 ASSAY OF MAGNESIUM: CPT | Performed by: SURGERY

## 2024-04-24 PROCEDURE — 2500000005 HC RX 250 GENERAL PHARMACY W/O HCPCS: Performed by: SURGERY

## 2024-04-24 RX ORDER — METOCLOPRAMIDE HYDROCHLORIDE 5 MG/ML
10 INJECTION INTRAMUSCULAR; INTRAVENOUS ONCE AS NEEDED
Status: DISCONTINUED | OUTPATIENT
Start: 2024-04-24 | End: 2024-04-24 | Stop reason: HOSPADM

## 2024-04-24 RX ORDER — ONDANSETRON 4 MG/1
4 TABLET, FILM COATED ORAL EVERY 8 HOURS PRN
Status: DISCONTINUED | OUTPATIENT
Start: 2024-04-24 | End: 2024-04-26 | Stop reason: HOSPADM

## 2024-04-24 RX ORDER — LIDOCAINE HYDROCHLORIDE 10 MG/ML
0.1 INJECTION, SOLUTION EPIDURAL; INFILTRATION; INTRACAUDAL; PERINEURAL ONCE
Status: DISCONTINUED | OUTPATIENT
Start: 2024-04-24 | End: 2024-04-24 | Stop reason: HOSPADM

## 2024-04-24 RX ORDER — HYDRALAZINE HYDROCHLORIDE 20 MG/ML
5 INJECTION INTRAMUSCULAR; INTRAVENOUS EVERY 30 MIN PRN
Status: DISCONTINUED | OUTPATIENT
Start: 2024-04-24 | End: 2024-04-24 | Stop reason: HOSPADM

## 2024-04-24 RX ORDER — DROPERIDOL 2.5 MG/ML
0.62 INJECTION, SOLUTION INTRAMUSCULAR; INTRAVENOUS ONCE AS NEEDED
Status: DISCONTINUED | OUTPATIENT
Start: 2024-04-24 | End: 2024-04-24 | Stop reason: HOSPADM

## 2024-04-24 RX ORDER — SIMETHICONE 80 MG
TABLET,CHEWABLE ORAL
Status: CANCELLED | OUTPATIENT
Start: 2024-04-24

## 2024-04-24 RX ORDER — MORPHINE SULFATE 2 MG/ML
2 INJECTION, SOLUTION INTRAMUSCULAR; INTRAVENOUS EVERY 5 MIN PRN
Status: DISCONTINUED | OUTPATIENT
Start: 2024-04-24 | End: 2024-04-24 | Stop reason: HOSPADM

## 2024-04-24 RX ORDER — CEFAZOLIN SODIUM 1 G/50ML
1 SOLUTION INTRAVENOUS ONCE
Status: COMPLETED | OUTPATIENT
Start: 2024-04-24 | End: 2024-04-24

## 2024-04-24 RX ORDER — DIPHENHYDRAMINE HYDROCHLORIDE 50 MG/ML
12.5 INJECTION INTRAMUSCULAR; INTRAVENOUS ONCE AS NEEDED
Status: DISCONTINUED | OUTPATIENT
Start: 2024-04-24 | End: 2024-04-24 | Stop reason: HOSPADM

## 2024-04-24 RX ORDER — DEXTROSE, SODIUM CHLORIDE, SODIUM LACTATE, POTASSIUM CHLORIDE, AND CALCIUM CHLORIDE 5; .6; .31; .03; .02 G/100ML; G/100ML; G/100ML; G/100ML; G/100ML
100 INJECTION, SOLUTION INTRAVENOUS CONTINUOUS
Status: DISCONTINUED | OUTPATIENT
Start: 2024-04-24 | End: 2024-04-25

## 2024-04-24 RX ORDER — PROMETHAZINE HYDROCHLORIDE 25 MG/1
25 SUPPOSITORY RECTAL EVERY 12 HOURS PRN
Status: DISCONTINUED | OUTPATIENT
Start: 2024-04-24 | End: 2024-04-26 | Stop reason: HOSPADM

## 2024-04-24 RX ORDER — PROMETHAZINE HYDROCHLORIDE 25 MG/1
25 TABLET ORAL EVERY 6 HOURS PRN
Status: DISCONTINUED | OUTPATIENT
Start: 2024-04-24 | End: 2024-04-26 | Stop reason: HOSPADM

## 2024-04-24 RX ORDER — BUPIVACAINE HYDROCHLORIDE 2.5 MG/ML
INJECTION, SOLUTION INFILTRATION; PERINEURAL AS NEEDED
Status: DISCONTINUED | OUTPATIENT
Start: 2024-04-24 | End: 2024-04-24 | Stop reason: HOSPADM

## 2024-04-24 RX ORDER — SODIUM CHLORIDE, SODIUM LACTATE, POTASSIUM CHLORIDE, CALCIUM CHLORIDE 600; 310; 30; 20 MG/100ML; MG/100ML; MG/100ML; MG/100ML
100 INJECTION, SOLUTION INTRAVENOUS CONTINUOUS
Status: DISCONTINUED | OUTPATIENT
Start: 2024-04-24 | End: 2024-04-24

## 2024-04-24 RX ORDER — LABETALOL HYDROCHLORIDE 5 MG/ML
5 INJECTION, SOLUTION INTRAVENOUS ONCE AS NEEDED
Status: DISCONTINUED | OUTPATIENT
Start: 2024-04-24 | End: 2024-04-24 | Stop reason: HOSPADM

## 2024-04-24 RX ORDER — ROPIVACAINE HYDROCHLORIDE 5 MG/ML
INJECTION, SOLUTION EPIDURAL; INFILTRATION; PERINEURAL AS NEEDED
Status: DISCONTINUED | OUTPATIENT
Start: 2024-04-24 | End: 2024-04-24

## 2024-04-24 RX ORDER — LEVOTHYROXINE SODIUM 25 UG/1
25 TABLET ORAL DAILY
Status: DISCONTINUED | OUTPATIENT
Start: 2024-04-24 | End: 2024-04-26 | Stop reason: HOSPADM

## 2024-04-24 RX ORDER — PANTOPRAZOLE SODIUM 40 MG/10ML
40 INJECTION, POWDER, LYOPHILIZED, FOR SOLUTION INTRAVENOUS
Status: DISCONTINUED | OUTPATIENT
Start: 2024-04-25 | End: 2024-04-26 | Stop reason: HOSPADM

## 2024-04-24 RX ORDER — ALBUTEROL SULFATE 0.83 MG/ML
2.5 SOLUTION RESPIRATORY (INHALATION) ONCE AS NEEDED
Status: DISCONTINUED | OUTPATIENT
Start: 2024-04-24 | End: 2024-04-24 | Stop reason: HOSPADM

## 2024-04-24 RX ORDER — SODIUM CHLORIDE, SODIUM LACTATE, POTASSIUM CHLORIDE, CALCIUM CHLORIDE 600; 310; 30; 20 MG/100ML; MG/100ML; MG/100ML; MG/100ML
100 INJECTION, SOLUTION INTRAVENOUS CONTINUOUS
Status: DISCONTINUED | OUTPATIENT
Start: 2024-04-24 | End: 2024-04-24 | Stop reason: HOSPADM

## 2024-04-24 RX ORDER — DEXTROMETHORPHAN/PSEUDOEPHED 2.5-7.5/.8
DROPS ORAL
Status: CANCELLED | OUTPATIENT
Start: 2024-04-24

## 2024-04-24 RX ORDER — ENOXAPARIN SODIUM 100 MG/ML
40 INJECTION SUBCUTANEOUS EVERY 24 HOURS
Status: DISCONTINUED | OUTPATIENT
Start: 2024-04-24 | End: 2024-04-26 | Stop reason: HOSPADM

## 2024-04-24 RX ORDER — MIDAZOLAM HYDROCHLORIDE 1 MG/ML
INJECTION, SOLUTION INTRAMUSCULAR; INTRAVENOUS AS NEEDED
Status: DISCONTINUED | OUTPATIENT
Start: 2024-04-24 | End: 2024-04-24

## 2024-04-24 RX ORDER — LIDOCAINE HYDROCHLORIDE 20 MG/ML
INJECTION, SOLUTION INFILTRATION; PERINEURAL AS NEEDED
Status: DISCONTINUED | OUTPATIENT
Start: 2024-04-24 | End: 2024-04-24

## 2024-04-24 RX ORDER — OXYCODONE AND ACETAMINOPHEN 5; 325 MG/1; MG/1
1 TABLET ORAL EVERY 4 HOURS PRN
Status: DISCONTINUED | OUTPATIENT
Start: 2024-04-24 | End: 2024-04-26

## 2024-04-24 RX ORDER — FENTANYL CITRATE 50 UG/ML
INJECTION, SOLUTION INTRAMUSCULAR; INTRAVENOUS AS NEEDED
Status: DISCONTINUED | OUTPATIENT
Start: 2024-04-24 | End: 2024-04-24

## 2024-04-24 RX ORDER — ONDANSETRON HYDROCHLORIDE 2 MG/ML
INJECTION, SOLUTION INTRAVENOUS AS NEEDED
Status: DISCONTINUED | OUTPATIENT
Start: 2024-04-24 | End: 2024-04-24

## 2024-04-24 RX ORDER — PROPOFOL 10 MG/ML
INJECTION, EMULSION INTRAVENOUS AS NEEDED
Status: DISCONTINUED | OUTPATIENT
Start: 2024-04-24 | End: 2024-04-24

## 2024-04-24 RX ORDER — SIMETHICONE 80 MG
80 TABLET,CHEWABLE ORAL 4 TIMES DAILY PRN
Status: DISCONTINUED | OUTPATIENT
Start: 2024-04-24 | End: 2024-04-26 | Stop reason: HOSPADM

## 2024-04-24 RX ORDER — FAMOTIDINE 10 MG/ML
20 INJECTION INTRAVENOUS ONCE
Status: COMPLETED | OUTPATIENT
Start: 2024-04-24 | End: 2024-04-24

## 2024-04-24 RX ORDER — DOCUSATE SODIUM 100 MG/1
100 CAPSULE, LIQUID FILLED ORAL 2 TIMES DAILY
Status: DISCONTINUED | OUTPATIENT
Start: 2024-04-24 | End: 2024-04-26 | Stop reason: HOSPADM

## 2024-04-24 RX ORDER — ONDANSETRON HYDROCHLORIDE 2 MG/ML
4 INJECTION, SOLUTION INTRAVENOUS ONCE AS NEEDED
Status: DISCONTINUED | OUTPATIENT
Start: 2024-04-24 | End: 2024-04-24 | Stop reason: HOSPADM

## 2024-04-24 RX ORDER — ONDANSETRON HYDROCHLORIDE 2 MG/ML
4 INJECTION, SOLUTION INTRAVENOUS EVERY 8 HOURS PRN
Status: DISCONTINUED | OUTPATIENT
Start: 2024-04-24 | End: 2024-04-26 | Stop reason: HOSPADM

## 2024-04-24 RX ORDER — MORPHINE SULFATE 2 MG/ML
2 INJECTION, SOLUTION INTRAMUSCULAR; INTRAVENOUS EVERY 4 HOURS PRN
Status: DISCONTINUED | OUTPATIENT
Start: 2024-04-24 | End: 2024-04-26 | Stop reason: HOSPADM

## 2024-04-24 RX ORDER — ROCURONIUM BROMIDE 10 MG/ML
INJECTION, SOLUTION INTRAVENOUS AS NEEDED
Status: DISCONTINUED | OUTPATIENT
Start: 2024-04-24 | End: 2024-04-24

## 2024-04-24 RX ORDER — OXYCODONE AND ACETAMINOPHEN 5; 325 MG/1; MG/1
1 TABLET ORAL EVERY 4 HOURS PRN
Status: DISCONTINUED | OUTPATIENT
Start: 2024-04-24 | End: 2024-04-24 | Stop reason: HOSPADM

## 2024-04-24 RX ORDER — PANTOPRAZOLE SODIUM 40 MG/1
40 TABLET, DELAYED RELEASE ORAL
Status: DISCONTINUED | OUTPATIENT
Start: 2024-04-25 | End: 2024-04-26 | Stop reason: HOSPADM

## 2024-04-24 RX ORDER — MEPERIDINE HYDROCHLORIDE 25 MG/ML
12.5 INJECTION INTRAMUSCULAR; INTRAVENOUS; SUBCUTANEOUS EVERY 10 MIN PRN
Status: DISCONTINUED | OUTPATIENT
Start: 2024-04-24 | End: 2024-04-24 | Stop reason: HOSPADM

## 2024-04-24 RX ADMIN — Medication 21 PERCENT: at 19:00

## 2024-04-24 RX ADMIN — SIMETHICONE 80 MG: 80 TABLET, CHEWABLE ORAL at 22:35

## 2024-04-24 RX ADMIN — SODIUM CHLORIDE, POTASSIUM CHLORIDE, SODIUM LACTATE AND CALCIUM CHLORIDE 100 ML/HR: 600; 310; 30; 20 INJECTION, SOLUTION INTRAVENOUS at 11:17

## 2024-04-24 RX ADMIN — DEXAMETHASONE SODIUM PHOSPHATE 8 MG: 4 INJECTION, SOLUTION INTRAMUSCULAR; INTRAVENOUS at 18:27

## 2024-04-24 RX ADMIN — PROPOFOL 200 MG: 10 INJECTION, EMULSION INTRAVENOUS at 12:42

## 2024-04-24 RX ADMIN — ROCURONIUM BROMIDE 50 MG: 10 INJECTION, SOLUTION INTRAVENOUS at 12:42

## 2024-04-24 RX ADMIN — SUGAMMADEX 200 MG: 100 INJECTION, SOLUTION INTRAVENOUS at 14:33

## 2024-04-24 RX ADMIN — DOCUSATE SODIUM 100 MG: 100 CAPSULE, LIQUID FILLED ORAL at 21:10

## 2024-04-24 RX ADMIN — DEXAMETHASONE SODIUM PHOSPHATE 4 MG: 4 INJECTION INTRA-ARTICULAR; INTRALESIONAL; INTRAMUSCULAR; INTRAVENOUS; SOFT TISSUE at 12:42

## 2024-04-24 RX ADMIN — SODIUM CHLORIDE, SODIUM LACTATE, POTASSIUM CHLORIDE, CALCIUM CHLORIDE AND DEXTROSE MONOHYDRATE 100 ML/HR: 5; 600; 310; 30; 20 INJECTION, SOLUTION INTRAVENOUS at 21:10

## 2024-04-24 RX ADMIN — Medication 21 PERCENT: at 18:15

## 2024-04-24 RX ADMIN — CEFAZOLIN SODIUM 1 G: 1 INJECTION, SOLUTION INTRAVENOUS at 12:37

## 2024-04-24 RX ADMIN — FENTANYL CITRATE 50 MCG: 50 INJECTION INTRAMUSCULAR; INTRAVENOUS at 12:42

## 2024-04-24 RX ADMIN — FAMOTIDINE 20 MG: 10 INJECTION, SOLUTION INTRAVENOUS at 11:16

## 2024-04-24 RX ADMIN — FENTANYL CITRATE 50 MCG: 50 INJECTION INTRAMUSCULAR; INTRAVENOUS at 14:47

## 2024-04-24 RX ADMIN — HYDROMORPHONE HYDROCHLORIDE 0.5 MG: 1 INJECTION, SOLUTION INTRAMUSCULAR; INTRAVENOUS; SUBCUTANEOUS at 15:15

## 2024-04-24 RX ADMIN — ROPIVACAINE HYDROCHLORIDE 30 ML: 5 INJECTION, SOLUTION EPIDURAL; INFILTRATION; PERINEURAL at 12:42

## 2024-04-24 RX ADMIN — MIDAZOLAM 2 MG: 1 INJECTION INTRAMUSCULAR; INTRAVENOUS at 12:37

## 2024-04-24 RX ADMIN — ONDANSETRON 4 MG: 2 INJECTION INTRAMUSCULAR; INTRAVENOUS at 14:28

## 2024-04-24 RX ADMIN — HYDROMORPHONE HYDROCHLORIDE 0.5 MG: 1 INJECTION, SOLUTION INTRAMUSCULAR; INTRAVENOUS; SUBCUTANEOUS at 15:25

## 2024-04-24 RX ADMIN — ROCURONIUM BROMIDE 20 MG: 10 INJECTION, SOLUTION INTRAVENOUS at 13:27

## 2024-04-24 RX ADMIN — DEXAMETHASONE SODIUM PHOSPHATE 8 MG: 4 INJECTION INTRA-ARTICULAR; INTRALESIONAL; INTRAMUSCULAR; INTRAVENOUS; SOFT TISSUE at 12:54

## 2024-04-24 RX ADMIN — LIDOCAINE HYDROCHLORIDE 100 MG: 20 INJECTION, SOLUTION INFILTRATION; PERINEURAL at 12:42

## 2024-04-24 RX ADMIN — OXYCODONE HYDROCHLORIDE AND ACETAMINOPHEN 1 TABLET: 5; 325 TABLET ORAL at 21:10

## 2024-04-24 RX ADMIN — ROCURONIUM BROMIDE 10 MG: 10 INJECTION, SOLUTION INTRAVENOUS at 14:04

## 2024-04-24 SDOH — SOCIAL STABILITY: SOCIAL INSECURITY: DO YOU FEEL UNSAFE GOING BACK TO THE PLACE WHERE YOU ARE LIVING?: NO

## 2024-04-24 SDOH — HEALTH STABILITY: MENTAL HEALTH: HOW OFTEN DO YOU HAVE A DRINK CONTAINING ALCOHOL?: NEVER

## 2024-04-24 SDOH — ECONOMIC STABILITY: TRANSPORTATION INSECURITY
IN THE PAST 12 MONTHS, HAS THE LACK OF TRANSPORTATION KEPT YOU FROM MEDICAL APPOINTMENTS OR FROM GETTING MEDICATIONS?: NO

## 2024-04-24 SDOH — ECONOMIC STABILITY: TRANSPORTATION INSECURITY
IN THE PAST 12 MONTHS, HAS LACK OF TRANSPORTATION KEPT YOU FROM MEETINGS, WORK, OR FROM GETTING THINGS NEEDED FOR DAILY LIVING?: NO

## 2024-04-24 SDOH — HEALTH STABILITY: MENTAL HEALTH: HOW OFTEN DO YOU HAVE 6 OR MORE DRINKS ON ONE OCCASION?: NEVER

## 2024-04-24 SDOH — HEALTH STABILITY: MENTAL HEALTH: CURRENT SMOKER: 0

## 2024-04-24 SDOH — SOCIAL STABILITY: SOCIAL INSECURITY: ARE YOU OR HAVE YOU BEEN THREATENED OR ABUSED PHYSICALLY, EMOTIONALLY, OR SEXUALLY BY ANYONE?: NO

## 2024-04-24 SDOH — SOCIAL STABILITY: SOCIAL INSECURITY: DO YOU FEEL ANYONE HAS EXPLOITED OR TAKEN ADVANTAGE OF YOU FINANCIALLY OR OF YOUR PERSONAL PROPERTY?: NO

## 2024-04-24 SDOH — ECONOMIC STABILITY: INCOME INSECURITY: HOW HARD IS IT FOR YOU TO PAY FOR THE VERY BASICS LIKE FOOD, HOUSING, MEDICAL CARE, AND HEATING?: NOT VERY HARD

## 2024-04-24 SDOH — SOCIAL STABILITY: SOCIAL INSECURITY: DOES ANYONE TRY TO KEEP YOU FROM HAVING/CONTACTING OTHER FRIENDS OR DOING THINGS OUTSIDE YOUR HOME?: NO

## 2024-04-24 SDOH — ECONOMIC STABILITY: INCOME INSECURITY: IN THE LAST 12 MONTHS, WAS THERE A TIME WHEN YOU WERE NOT ABLE TO PAY THE MORTGAGE OR RENT ON TIME?: NO

## 2024-04-24 SDOH — SOCIAL STABILITY: SOCIAL INSECURITY: ARE THERE ANY APPARENT SIGNS OF INJURIES/BEHAVIORS THAT COULD BE RELATED TO ABUSE/NEGLECT?: NO

## 2024-04-24 SDOH — SOCIAL STABILITY: SOCIAL INSECURITY: ABUSE: ADULT

## 2024-04-24 SDOH — SOCIAL STABILITY: SOCIAL INSECURITY: HAS ANYONE EVER THREATENED TO HURT YOUR FAMILY OR YOUR PETS?: NO

## 2024-04-24 SDOH — SOCIAL STABILITY: SOCIAL INSECURITY: HAVE YOU HAD ANY THOUGHTS OF HARMING ANYONE ELSE?: NO

## 2024-04-24 SDOH — ECONOMIC STABILITY: HOUSING INSECURITY: IN THE LAST 12 MONTHS, HOW MANY PLACES HAVE YOU LIVED?: 1

## 2024-04-24 SDOH — ECONOMIC STABILITY: HOUSING INSECURITY
IN THE LAST 12 MONTHS, WAS THERE A TIME WHEN YOU DID NOT HAVE A STEADY PLACE TO SLEEP OR SLEPT IN A SHELTER (INCLUDING NOW)?: NO

## 2024-04-24 SDOH — SOCIAL STABILITY: SOCIAL INSECURITY: WERE YOU ABLE TO COMPLETE ALL THE BEHAVIORAL HEALTH SCREENINGS?: YES

## 2024-04-24 SDOH — HEALTH STABILITY: MENTAL HEALTH: HOW MANY STANDARD DRINKS CONTAINING ALCOHOL DO YOU HAVE ON A TYPICAL DAY?: PATIENT DOES NOT DRINK

## 2024-04-24 SDOH — SOCIAL STABILITY: SOCIAL INSECURITY: HAVE YOU HAD THOUGHTS OF HARMING ANYONE ELSE?: NO

## 2024-04-24 ASSESSMENT — LIFESTYLE VARIABLES
HOW OFTEN DO YOU HAVE 6 OR MORE DRINKS ON ONE OCCASION: NEVER
SKIP TO QUESTIONS 9-10: 1
SKIP TO QUESTIONS 9-10: 1
HOW MANY STANDARD DRINKS CONTAINING ALCOHOL DO YOU HAVE ON A TYPICAL DAY: PATIENT DOES NOT DRINK
AUDIT-C TOTAL SCORE: 0
SKIP TO QUESTIONS 9-10: 1
AUDIT-C TOTAL SCORE: 0
AUDIT-C TOTAL SCORE: 0
HOW OFTEN DO YOU HAVE A DRINK CONTAINING ALCOHOL: NEVER
AUDIT-C TOTAL SCORE: 0

## 2024-04-24 ASSESSMENT — COGNITIVE AND FUNCTIONAL STATUS - GENERAL
WALKING IN HOSPITAL ROOM: A LITTLE
HELP NEEDED FOR BATHING: A LITTLE
HELP NEEDED FOR BATHING: A LITTLE
STANDING UP FROM CHAIR USING ARMS: A LITTLE
MOVING FROM LYING ON BACK TO SITTING ON SIDE OF FLAT BED WITH BEDRAILS: A LITTLE
TURNING FROM BACK TO SIDE WHILE IN FLAT BAD: A LITTLE
DRESSING REGULAR UPPER BODY CLOTHING: A LITTLE
MOVING FROM LYING ON BACK TO SITTING ON SIDE OF FLAT BED WITH BEDRAILS: A LITTLE
DAILY ACTIVITIY SCORE: 21
DRESSING REGULAR UPPER BODY CLOTHING: A LITTLE
MOVING TO AND FROM BED TO CHAIR: A LITTLE
DRESSING REGULAR LOWER BODY CLOTHING: A LITTLE
PATIENT BASELINE BEDBOUND: NO
DRESSING REGULAR LOWER BODY CLOTHING: A LITTLE
CLIMB 3 TO 5 STEPS WITH RAILING: A LITTLE
WALKING IN HOSPITAL ROOM: A LITTLE
MOBILITY SCORE: 18
TURNING FROM BACK TO SIDE WHILE IN FLAT BAD: A LITTLE
MOVING TO AND FROM BED TO CHAIR: A LITTLE
DAILY ACTIVITIY SCORE: 20
CLIMB 3 TO 5 STEPS WITH RAILING: A LITTLE
MOBILITY SCORE: 18
TOILETING: A LITTLE
STANDING UP FROM CHAIR USING ARMS: A LITTLE

## 2024-04-24 ASSESSMENT — PAIN SCALES - GENERAL
PAINLEVEL_OUTOF10: 3
PAINLEVEL_OUTOF10: 2
PAINLEVEL_OUTOF10: 2
PAINLEVEL_OUTOF10: 5 - MODERATE PAIN
PAINLEVEL_OUTOF10: 0 - NO PAIN
PAINLEVEL_OUTOF10: 6
PAINLEVEL_OUTOF10: 2
PAIN_LEVEL: 4
PAINLEVEL_OUTOF10: 2
PAINLEVEL_OUTOF10: 7
PAINLEVEL_OUTOF10: 2
PAINLEVEL_OUTOF10: 7

## 2024-04-24 ASSESSMENT — ACTIVITIES OF DAILY LIVING (ADL)
DRESSING YOURSELF: NEEDS ASSISTANCE
BATHING: NEEDS ASSISTANCE
ADEQUATE_TO_COMPLETE_ADL: YES
GROOMING: NEEDS ASSISTANCE
HEARING - RIGHT EAR: FUNCTIONAL
JUDGMENT_ADEQUATE_SAFELY_COMPLETE_DAILY_ACTIVITIES: YES
ASSISTIVE_DEVICE: EYEGLASSES
TOILETING: NEEDS ASSISTANCE
FEEDING YOURSELF: INDEPENDENT
HEARING - LEFT EAR: FUNCTIONAL
PATIENT'S MEMORY ADEQUATE TO SAFELY COMPLETE DAILY ACTIVITIES?: YES
LACK_OF_TRANSPORTATION: NO
WALKS IN HOME: NEEDS ASSISTANCE

## 2024-04-24 ASSESSMENT — PAIN DESCRIPTION - ORIENTATION
ORIENTATION: MID;LOWER
ORIENTATION: MID

## 2024-04-24 ASSESSMENT — COLUMBIA-SUICIDE SEVERITY RATING SCALE - C-SSRS
1. IN THE PAST MONTH, HAVE YOU WISHED YOU WERE DEAD OR WISHED YOU COULD GO TO SLEEP AND NOT WAKE UP?: NO
6. HAVE YOU EVER DONE ANYTHING, STARTED TO DO ANYTHING, OR PREPARED TO DO ANYTHING TO END YOUR LIFE?: NO
2. HAVE YOU ACTUALLY HAD ANY THOUGHTS OF KILLING YOURSELF?: NO

## 2024-04-24 ASSESSMENT — PAIN - FUNCTIONAL ASSESSMENT
PAIN_FUNCTIONAL_ASSESSMENT: 0-10

## 2024-04-24 ASSESSMENT — PAIN DESCRIPTION - DESCRIPTORS
DESCRIPTORS: DISCOMFORT;PRESSURE
DESCRIPTORS: DISCOMFORT;CRAMPING
DESCRIPTORS: SHARP

## 2024-04-24 ASSESSMENT — PAIN DESCRIPTION - LOCATION
LOCATION: ABDOMEN
LOCATION: ABDOMEN

## 2024-04-24 NOTE — ANESTHESIA POSTPROCEDURE EVALUATION
Patient: Xochitl Pichardo    Procedure Summary       Date: 04/24/24 Room / Location: [facility] / [facility]    Anesthesia Start: 1237 Anesthesia Stop: 1500    Procedures:       LOOP TRANSVERSE OSTOMY CLOSURE (gusz to assist)      Colonoscopy Diagnosis:       Neoplasm of uncertain behavior of sigmoid colon      (Neoplasm of uncertain behavior of sigmoid colon [D37.4])    Surgeons: Estrellita Nguyen MD Responsible Provider: HENNA Brooks    Anesthesia Type: general ASA Status: 2            Anesthesia Type: general    Vitals Value Taken Time   /80 04/24/24 1542   Temp 36.7 °C (98 °F) 04/24/24 1500   Pulse 58 04/24/24 1545   Resp 29 04/24/24 1545   SpO2 99 % 04/24/24 1545   Vitals shown include unfiled device data.    Anesthesia Post Evaluation    Patient location during evaluation: PACU  Patient participation: complete - patient participated  Level of consciousness: awake  Pain score: 4  Pain management: adequate  Airway patency: patent  Cardiovascular status: acceptable  Respiratory status: acceptable  Hydration status: acceptable  Postoperative Nausea and Vomiting: none    There were no known notable events for this encounter.

## 2024-04-24 NOTE — ANESTHESIA PROCEDURE NOTES
"Peripheral Block    Patient location during procedure: OR  Start time: 4/24/2024 12:37 PM  End time: 4/24/2024 12:42 PM  Reason for block: at surgeon's request and post-op pain management  Staffing  Performed: CRNA   Authorized by: HENNA Brooks    Performed by: HENNA Brooks  Preanesthetic Checklist  Completed: patient identified, IV checked, site marked, risks and benefits discussed, surgical consent, monitors and equipment checked, pre-op evaluation and timeout performed   Timeout performed at: 4/24/2024 12:37 PM  Peripheral Block  Patient position: laying flat  Prep: ChloraPrep  Patient monitoring: heart rate, cardiac monitor and continuous pulse ox  Block type: TAP  Laterality: left  Injection technique: single-shot  Guidance: ultrasound guided  Local infiltration: ropivacaine  Infiltration strength: 0.3 %  Dose: 30 mL  Needle  Needle gauge: 20 G  Needle length: 15 cm  Needle localization: anatomical landmarks and ultrasound guidance  Assessment  Injection assessment: negative aspiration for heme, no paresthesia on injection, incremental injection and local visualized surrounding nerve on ultrasound  Paresthesia pain: none  Heart rate change: no  Slow fractionated injection: yes  Additional Notes  Anesthesia consult was placed by Dr. Nguyen for post procedural analgesia.  The patient's chart was reviewed and they were deemed an appropriate candidate for the procedure. The patient was educated in detail on the risks, benefits, and alternatives to the block including but not limited to: temporary or permanent nerve damage, bleeding, infection, damage to surrounding tissues, possible block failure and the potential for local anesthesia toxicity syndrome.  The patient expressed understanding and all questions were answered prior to the initiation of the procedure.  Informed consent was also signed by the patient and laterality determined per institutional policy.  A formal \"time out " "\"consistent with the institutions rules and regulations was performed by the anesthesia provider and appropriate RN.     Procedure  The patient was placed in the supine position. The patient was marked on the LEFT side and skin prep applied and allowed to dry. Proper monitors were applied with oxygen.  The patient was placed under general anesthesia (see induction note).     A high frequency ultrasound probe with probe cover was placed over the three layers of the abdominal musculature and grossly identified using sterile coupling gel following institutional skin prep. The three layers of the abdominal musculature were carefully identified SUBCOSTAL/MIDAXILLARY region.  An 20G 6 inch stimuplex needle was then advanced maintaining an in-plane visualization throughout the procedure, under ultrasound guidance from medial to lateral to come to rest just deep to the internal oblique muscle. Upon negative aspiration, 30 ml 0.25% Ropivacaine with 4mg decadron preservative free was administered safely and cautiously on the left side. Aspiration every 3-5ml was done to avoid potential intravascular injection.  All injections were done without resistance and were free of blood. The patient tolerated the procedure well. No complications noted.               "

## 2024-04-24 NOTE — H&P
History Of Present Illness  Xochitl Pichardo is a 58 y.o. female presenting here for colostomy take down.     Past Medical History  Past Medical History:   Diagnosis Date    Colon cancer (Multi)     Dental disease     chipped    Hypothyroidism     Murmur     Other specified disorders of the skin and subcutaneous tissue 04/13/2019    Morgellons syndrome    Other specified disorders of the skin and subcutaneous tissue 04/23/2019    Morgellons syndrome    Vision loss        Surgical History  Past Surgical History:   Procedure Laterality Date    COLONOSCOPY      OTHER SURGICAL HISTORY  10/14/2023    loop ostomy        Social History  She reports that she has never smoked. She has never used smokeless tobacco. She reports that she does not drink alcohol and does not use drugs.    Family History  Family History   Problem Relation Name Age of Onset    Parkinsonism Mother      Heart disease Father      Heart disease Brother      Heart disease Paternal Grandfather          Allergies  Golytely [peg 3350-electrolytes]    Review of Systems     Physical Exam  Eyes:      Extraocular Movements: Extraocular movements intact.      Pupils: Pupils are equal, round, and reactive to light.   Cardiovascular:      Rate and Rhythm: Normal rate and regular rhythm.   Pulmonary:      Effort: Pulmonary effort is normal.   Abdominal:      Palpations: Abdomen is soft.   Skin:     General: Skin is warm and dry.   Neurological:      Mental Status: She is alert.   Psychiatric:         Mood and Affect: Mood normal.         Behavior: Behavior normal.          Last Recorded Vitals  Blood pressure 141/83, pulse 103, temperature 36.9 °C (98.5 °F), temperature source Temporal, resp. rate 16, SpO2 98%.    Relevant Results             Assessment/Plan   Principal Problem:    Colon cancer (Multi)      Here for colostomy take down       I spent 20 minutes in the professional and overall care of this patient.      Estrellita Nguyen MD

## 2024-04-24 NOTE — CARE PLAN
The patient's goals for the shift include    Problem: Pain  Goal: Takes deep breaths with improved pain control throughout the shift  Outcome: Progressing  Goal: Turns in bed with improved pain control throughout the shift  Outcome: Progressing  Goal: Walks with improved pain control throughout the shift  Outcome: Progressing  Goal: Performs ADL's with improved pain control throughout shift  Outcome: Progressing  Goal: Participates in PT with improved pain control throughout the shift  Outcome: Progressing  Goal: Free from opioid side effects throughout the shift  Outcome: Progressing  Goal: Free from acute confusion related to pain meds throughout the shift  Outcome: Progressing     Problem: Pain - Adult  Goal: Verbalizes/displays adequate comfort level or baseline comfort level  Outcome: Progressing     Problem: Safety - Adult  Goal: Free from fall injury  Outcome: Progressing     Problem: Discharge Planning  Goal: Discharge to home or other facility with appropriate resources  Outcome: Progressing     Problem: Chronic Conditions and Co-morbidities  Goal: Patient's chronic conditions and co-morbidity symptoms are monitored and maintained or improved  Outcome: Progressing     Problem: Skin  Goal: Decreased wound size/increased tissue granulation at next dressing change  Outcome: Progressing  Flowsheets (Taken 4/24/2024 1284)  Decreased wound size/increased tissue granulation at next dressing change: Promote sleep for wound healing  Goal: Promote skin healing  Outcome: Progressing  Flowsheets (Taken 4/24/2024 7745)  Promote skin healing:   Assess skin/pad under line(s)/device(s)   Turn/reposition every 2 hours/use positioning/transfer devices     Problem: Fall/Injury  Goal: Not fall by end of shift  Outcome: Progressing  Goal: Be free from injury by end of the shift  Outcome: Progressing     Problem: Pain  Goal: My pain/discomfort is manageable  Outcome: Progressing     Problem: Safety  Goal: I will remain free of  falls  Outcome: Progressing     Problem: Daily Care  Goal: Daily care needs are met  Outcome: Progressing     Problem: Psychosocial Needs  Goal: Demonstrates ability to cope with hospitalization/illness  Outcome: Progressing  Goal: Collaborate with me, my family, and caregiver to identify my specific goals  Outcome: Progressing  Flowsheets (Taken 4/24/2024 2175)  Cultural Requests During Hospitalization: n/a  Spiritual Requests During Hospitalization: n/a     Problem: Discharge Barriers  Goal: My discharge needs are met  Outcome: Progressing

## 2024-04-24 NOTE — ANESTHESIA PROCEDURE NOTES
Airway  Date/Time: 4/24/2024 12:46 PM  Urgency: elective    Airway not difficult    Staffing  Performed: CRNA   Authorized by: HENNA Brooks    Performed by: HENNA Brooks  Patient location during procedure: OR    Indications and Patient Condition  Indications for airway management: anesthesia  Spontaneous Ventilation: absent  Sedation level: deep  Preoxygenated: yes  Patient position: sniffing  Mask difficulty assessment: 1 - vent by mask    Final Airway Details  Final airway type: endotracheal airway      Successful airway: ETT  Cuffed: yes   Successful intubation technique: video laryngoscopy  Facilitating devices/methods: intubating stylet  Endotracheal tube insertion site: oral  Blade: Rickey  Blade size: #3  ETT size (mm): 7.0  Cormack-Lehane Classification: grade I - full view of glottis  Placement verified by: chest auscultation and capnometry   Measured from: lips  ETT to lips (cm): 22  Number of attempts at approach: 1  Ventilation between attempts: BVM  Number of other approaches attempted: 0

## 2024-04-24 NOTE — ANESTHESIA PREPROCEDURE EVALUATION
Patient: Xochitl Pichardo    Procedure Information       Date/Time: 04/24/24 1200    Procedure: LOOP TRANSVERSE OSTOMY CLOSURE (gusz to assist) - WED 03/27/2024   3 HOUR  12:00 PM  GUSZ ASSIST  DX:D37.4    Location: [facility] / [facility]    Surgeons: Estrellita Nguyen MD            Relevant Problems   Anesthesia (within normal limits)      GI   (+) Colon cancer (Multi)      Liver   (+) Colon cancer (Multi)      Endocrine   (+) Hypothyroid       Clinical information reviewed:   Tobacco  Allergies  Meds  Problems  Med Hx  Surg Hx  OB Status    Fam Hx  Soc Hx        NPO Detail:  NPO/Void Status  Carbohydrate Drink Given Prior to Surgery? : Y (at 7:30am)  Date of Last Liquid: 04/24/24  Time of Last Liquid: 0730  Date of Last Solid: 04/21/24  Time of Last Solid: 2100  Last Intake Type: Carbohydrate drink         Physical Exam    Airway  Mallampati: II  TM distance: >3 FB  Neck ROM: full     Cardiovascular - normal exam     Dental - normal exam     Pulmonary - normal exam     Abdominal - normal exam         Anesthesia Plan    History of general anesthesia?: yes  History of complications of general anesthesia?: no    ASA 2     general   (GA  Bilateral TAP Block)  The patient is not a current smoker.    intravenous induction   Postoperative administration of opioids is intended.  Anesthetic plan and risks discussed with patient.  Use of blood products discussed with patient who.    Plan discussed with CRNA.

## 2024-04-24 NOTE — OP NOTE
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]     Procedures  LOOP TRANSVERSE OSTOMY CLOSURE (gusz to assist)  13356 - [place] CLOSURE ENTEROSTOMY LG/SMALL INTESTINE    LOOP TRANSVERSE OSTOMY CLOSURE (gusz to assist)  81460 - [place] CLSR NTRSTM LG/SM RESCJ & COLORECTAL ANASTOMOSIS    Colonoscopy  39869 - [place] COLONOSCOPY FLX DX W/COLLJ SPEC WHEN PFRMD      Surgeons   Panel 1:     * Estrellita Nguyen - Primary  Panel 2:     * Estrellita Nguyen - Primary    Resident/Fellow/Other Assistant:  Surgeons and Role:  * No surgeons found with a matching role *    Procedure Summary  Anesthesia: General  ASA: II  Anesthesia Staff: CRNA: HENNA Brooks  Estimated Blood Loss: 12 mL  Intra-op Medications:   Administrations occurring from 1200 to 1530 on 24:   Medication Name Total Dose   BUPivacaine HCl (Marcaine) 0.25 % (2.5 mg/mL) injection 10 mL   HYDROmorphone (Dilaudid) injection 0.5 mg 0.5 mg   HYDROmorphone (Dilaudid) injection 0.5 mg 0.5 mg   lactated Ringer's infusion Cannot be calculated   ceFAZolin in dextrose (iso-os) (Ancef) IVPB 1 g 1 g              Anesthesia Record               Intraprocedure I/O Totals          Intake    lactated Ringer's infusion 1000.00 mL    Total Intake 1000 mL          Specimen: No specimens collected     Staff:   Circulator: Paloma Terrazas RN; Ronald Juarez RN  Scrub Person: Elizabeth Sanz; Ingrid Shaw         Drains and/or Catheters: * None in log *    Tourniquet Times:         Implants:     Findings: ostomy taken down and closed with 60 TA stapler.  Colonoscopy passed through to ensure no stenosis of colon.     Indications: Xochitl Pichardo is an 58 y.o. 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     The patient was seen in the preoperative area. The risks, benefits, complications, treatment options, non-operative alternatives, expected recovery and outcomes were discussed with the patient. The possibilities of reaction to medication, pulmonary aspiration, injury to surrounding structures, bleeding, recurrent infection, the need for additional procedures, failure to diagnose a condition, and creating a complication requiring transfusion or operation were discussed with the patient. The patient concurred with the proposed plan, giving informed consent.  The site of surgery was properly noted/marked if necessary per policy. The patient has been actively warmed in preoperative area. Preoperative antibiotics have been ordered and given within 1 hours of incision. Venous thrombosis prophylaxis have been ordered including bilateral sequential compression devices    Procedure Details: Pt taken to the OR placed supine.  General anesthesia administered, pt endotracheally intubated.  Pt under went TEPP block on the left side and intercostal block by anesthesia.  The ostomy was closed with 3-0 silk suture.  Area was localized with additional Marcaine surrounding the ostomy site.  The skin surrounding this was opened in an elliptical fashion and carried down to disconnect the transverse colon from the fascial edges. This required extensive dissection, taking care to avoid injury to the bowel. Once this was freed the site of the ostomy was closed with TA 60 stapler. This was reduced into the abdomen. The colonoscope was introduced through the anus and passed beyond the anastomosis and then up and beyond the staple line.  The abdomen was filled with saline to check for any bubbles while the colon was filled with air.  The fascia was closed with double looped PDS.  Subcutaneous tissue was irrigated with saline and skin closed with staples.  Packed  with 1/2 inch iodoform.  Dressing applied.  Binder placed  Pt extubated and taken to PACU in stable condition.     Complications:  None; patient tolerated the procedure well.    Disposition: PACU - hemodynamically stable.  Condition: stable         Additional Details: above    Attending Attestation: A qualified resident physician was not available.    Estrellita Nguyen  Phone Number: [phone]       <<-----Click here for Discharge Medication Review

## 2024-04-25 PROCEDURE — 2500000001 HC RX 250 WO HCPCS SELF ADMINISTERED DRUGS (ALT 637 FOR MEDICARE OP): Performed by: SURGERY

## 2024-04-25 PROCEDURE — 2500000005 HC RX 250 GENERAL PHARMACY W/O HCPCS: Performed by: ANESTHESIOLOGY

## 2024-04-25 PROCEDURE — 2500000004 HC RX 250 GENERAL PHARMACY W/ HCPCS (ALT 636 FOR OP/ED): Performed by: SURGERY

## 2024-04-25 PROCEDURE — 1100000001 HC PRIVATE ROOM DAILY

## 2024-04-25 RX ORDER — BISACODYL 10 MG/1
10 SUPPOSITORY RECTAL DAILY
Status: DISCONTINUED | OUTPATIENT
Start: 2024-04-25 | End: 2024-04-26 | Stop reason: HOSPADM

## 2024-04-25 RX ADMIN — OXYCODONE HYDROCHLORIDE AND ACETAMINOPHEN 1 TABLET: 5; 325 TABLET ORAL at 14:54

## 2024-04-25 RX ADMIN — DOCUSATE SODIUM 100 MG: 100 CAPSULE, LIQUID FILLED ORAL at 19:35

## 2024-04-25 RX ADMIN — Medication 1 L/MIN: at 08:00

## 2024-04-25 RX ADMIN — OXYCODONE HYDROCHLORIDE AND ACETAMINOPHEN 1 TABLET: 5; 325 TABLET ORAL at 04:05

## 2024-04-25 RX ADMIN — OXYCODONE HYDROCHLORIDE AND ACETAMINOPHEN 1 TABLET: 5; 325 TABLET ORAL at 19:36

## 2024-04-25 RX ADMIN — SIMETHICONE 80 MG: 80 TABLET, CHEWABLE ORAL at 23:40

## 2024-04-25 RX ADMIN — OXYCODONE HYDROCHLORIDE AND ACETAMINOPHEN 1 TABLET: 5; 325 TABLET ORAL at 23:40

## 2024-04-25 RX ADMIN — OXYCODONE HYDROCHLORIDE AND ACETAMINOPHEN 1 TABLET: 5; 325 TABLET ORAL at 10:31

## 2024-04-25 RX ADMIN — SIMETHICONE 80 MG: 80 TABLET, CHEWABLE ORAL at 14:48

## 2024-04-25 RX ADMIN — SODIUM CHLORIDE, SODIUM LACTATE, POTASSIUM CHLORIDE, CALCIUM CHLORIDE AND DEXTROSE MONOHYDRATE 100 ML/HR: 5; 600; 310; 30; 20 INJECTION, SOLUTION INTRAVENOUS at 07:37

## 2024-04-25 RX ADMIN — SIMETHICONE 80 MG: 80 TABLET, CHEWABLE ORAL at 04:05

## 2024-04-25 RX ADMIN — DOCUSATE SODIUM 100 MG: 100 CAPSULE, LIQUID FILLED ORAL at 10:31

## 2024-04-25 RX ADMIN — BISACODYL 10 MG: 10 SUPPOSITORY RECTAL at 16:28

## 2024-04-25 RX ADMIN — SIMETHICONE 80 MG: 80 TABLET, CHEWABLE ORAL at 19:36

## 2024-04-25 ASSESSMENT — PAIN DESCRIPTION - DESCRIPTORS
DESCRIPTORS: DISCOMFORT;PRESSURE
DESCRIPTORS: DISCOMFORT;PRESSURE
DESCRIPTORS: DISCOMFORT
DESCRIPTORS: DISCOMFORT
DESCRIPTORS: DISCOMFORT;PRESSURE

## 2024-04-25 ASSESSMENT — PAIN - FUNCTIONAL ASSESSMENT
PAIN_FUNCTIONAL_ASSESSMENT: 0-10

## 2024-04-25 ASSESSMENT — COGNITIVE AND FUNCTIONAL STATUS - GENERAL
STANDING UP FROM CHAIR USING ARMS: A LITTLE
CLIMB 3 TO 5 STEPS WITH RAILING: A LITTLE
HELP NEEDED FOR BATHING: A LITTLE
TURNING FROM BACK TO SIDE WHILE IN FLAT BAD: A LITTLE
MOVING FROM LYING ON BACK TO SITTING ON SIDE OF FLAT BED WITH BEDRAILS: A LITTLE
STANDING UP FROM CHAIR USING ARMS: A LITTLE
MOVING TO AND FROM BED TO CHAIR: A LITTLE
TOILETING: A LITTLE
TOILETING: A LITTLE
WALKING IN HOSPITAL ROOM: A LITTLE
WALKING IN HOSPITAL ROOM: A LITTLE
HELP NEEDED FOR BATHING: A LITTLE
DAILY ACTIVITIY SCORE: 20
DRESSING REGULAR LOWER BODY CLOTHING: A LITTLE
MOBILITY SCORE: 21
CLIMB 3 TO 5 STEPS WITH RAILING: A LITTLE
DRESSING REGULAR UPPER BODY CLOTHING: A LITTLE
DRESSING REGULAR UPPER BODY CLOTHING: A LITTLE
MOBILITY SCORE: 18
DRESSING REGULAR LOWER BODY CLOTHING: A LITTLE
DAILY ACTIVITIY SCORE: 20

## 2024-04-25 ASSESSMENT — PAIN SCALES - GENERAL
PAINLEVEL_OUTOF10: 4
PAINLEVEL_OUTOF10: 6
PAINLEVEL_OUTOF10: 6
PAINLEVEL_OUTOF10: 4

## 2024-04-25 NOTE — PROGRESS NOTES
"Xochitl Pichardo is a 58 y.o. female on day 1 of admission presenting with Colon cancer (Multi).    Subjective   Pt feeling like she has a lot of gas to pass. Is passing some. No BM as of yet denies nausea.       Objective     Physical Exam RRR Nonlabored breathing. Wound with some drainage as expected with packing in place.      Last Recorded Vitals  Blood pressure 114/70, pulse 86, temperature 37.1 °C (98.8 °F), temperature source Temporal, resp. rate 18, height 1.715 m (5' 7.52\"), weight 78.9 kg (173 lb 15.1 oz), SpO2 96%.  Intake/Output last 3 Shifts:  I/O last 3 completed shifts:  In: 4090 (51.8 mL/kg) [P.O.:500; I.V.:3590 (45.5 mL/kg)]  Out: - (0 mL/kg)   Weight: 78.9 kg     Relevant Results                             Assessment/Plan   Principal Problem:    Colon cancer (Multi)    POD # 1 from transverse ostomy take down, Doing well await return of bowel function will try dulcolax supp to stimulate function and help with gas passage.  Hep lock iv       I spent 20 minutes in the professional and overall care of this patient.      Estrellita Nguyen MD      "

## 2024-04-25 NOTE — CARE PLAN
The clinical goals for the shift include no falls or injury      Problem: Pain  Goal: Takes deep breaths with improved pain control throughout the shift  Outcome: Progressing  Goal: Turns in bed with improved pain control throughout the shift  Outcome: Progressing  Goal: Walks with improved pain control throughout the shift  Outcome: Progressing  Goal: Performs ADL's with improved pain control throughout shift  Outcome: Progressing  Goal: Participates in PT with improved pain control throughout the shift  Outcome: Progressing  Goal: Free from opioid side effects throughout the shift  Outcome: Progressing  Goal: Free from acute confusion related to pain meds throughout the shift  Outcome: Progressing     Problem: Pain - Adult  Goal: Verbalizes/displays adequate comfort level or baseline comfort level  Outcome: Progressing     Problem: Safety - Adult  Goal: Free from fall injury  Outcome: Progressing     Problem: Discharge Planning  Goal: Discharge to home or other facility with appropriate resources  Outcome: Progressing     Problem: Chronic Conditions and Co-morbidities  Goal: Patient's chronic conditions and co-morbidity symptoms are monitored and maintained or improved  Outcome: Progressing     Problem: Skin  Goal: Decreased wound size/increased tissue granulation at next dressing change  Outcome: Progressing  Goal: Promote skin healing  Outcome: Progressing     Problem: Fall/Injury  Goal: Not fall by end of shift  Outcome: Progressing  Goal: Be free from injury by end of the shift  Outcome: Progressing     Problem: Pain  Goal: My pain/discomfort is manageable  Outcome: Progressing     Problem: Safety  Goal: I will remain free of falls  Outcome: Progressing     Problem: Daily Care  Goal: Daily care needs are met  Outcome: Progressing     Problem: Psychosocial Needs  Goal: Demonstrates ability to cope with hospitalization/illness  Outcome: Progressing  Goal: Collaborate with me, my family, and caregiver to  identify my specific goals  Outcome: Progressing     Problem: Discharge Barriers  Goal: My discharge needs are met  Outcome: Progressing

## 2024-04-25 NOTE — PROGRESS NOTES
04/25/24 0942   Discharge Planning   Living Arrangements Spouse/significant other   Support Systems Spouse/significant other   Assistance Needed Patient is A&OX4, independent, drives and does not use any assistive devices at baseline.   Type of Residence Private residence   Number of Stairs to Enter Residence 2   Number of Stairs Within Residence 0   Do you have animals or pets at home? No   Home or Post Acute Services None   Patient expects to be discharged to: Spoke to patient and she is denying any needs for home. Patient  has taken off work to assist her in recovery. Patient  will transport patient home once medically clear.   Does the patient need discharge transport arranged? No

## 2024-04-26 ENCOUNTER — PHARMACY VISIT (OUTPATIENT)
Dept: PHARMACY | Facility: CLINIC | Age: 59
End: 2024-04-26
Payer: COMMERCIAL

## 2024-04-26 VITALS
DIASTOLIC BLOOD PRESSURE: 70 MMHG | SYSTOLIC BLOOD PRESSURE: 108 MMHG | TEMPERATURE: 97.4 F | WEIGHT: 173.94 LBS | BODY MASS INDEX: 26.36 KG/M2 | RESPIRATION RATE: 16 BRPM | OXYGEN SATURATION: 96 % | HEART RATE: 73 BPM | HEIGHT: 68 IN

## 2024-04-26 PROCEDURE — RXMED WILLOW AMBULATORY MEDICATION CHARGE

## 2024-04-26 PROCEDURE — 2500000001 HC RX 250 WO HCPCS SELF ADMINISTERED DRUGS (ALT 637 FOR MEDICARE OP): Performed by: SURGERY

## 2024-04-26 RX ORDER — DOCUSATE SODIUM 100 MG/1
100 CAPSULE, LIQUID FILLED ORAL 2 TIMES DAILY PRN
Qty: 14 CAPSULE | Refills: 0 | Status: SHIPPED | OUTPATIENT
Start: 2024-04-26 | End: 2024-05-03

## 2024-04-26 RX ORDER — OXYCODONE HYDROCHLORIDE 5 MG/1
5 TABLET ORAL EVERY 4 HOURS PRN
Status: DISCONTINUED | OUTPATIENT
Start: 2024-04-26 | End: 2024-04-26 | Stop reason: HOSPADM

## 2024-04-26 RX ORDER — OXYCODONE HYDROCHLORIDE 5 MG/1
5 TABLET ORAL EVERY 6 HOURS PRN
Qty: 10 TABLET | Refills: 0 | Status: SHIPPED | OUTPATIENT
Start: 2024-04-26

## 2024-04-26 RX ADMIN — DOCUSATE SODIUM 100 MG: 100 CAPSULE, LIQUID FILLED ORAL at 08:58

## 2024-04-26 RX ADMIN — SIMETHICONE 80 MG: 80 TABLET, CHEWABLE ORAL at 07:28

## 2024-04-26 RX ADMIN — OXYCODONE 5 MG: 5 TABLET ORAL at 05:21

## 2024-04-26 ASSESSMENT — COGNITIVE AND FUNCTIONAL STATUS - GENERAL
DAILY ACTIVITIY SCORE: 20
TOILETING: A LITTLE
DRESSING REGULAR UPPER BODY CLOTHING: A LITTLE
CLIMB 3 TO 5 STEPS WITH RAILING: A LITTLE
DRESSING REGULAR LOWER BODY CLOTHING: A LITTLE
HELP NEEDED FOR BATHING: A LITTLE
MOBILITY SCORE: 21
STANDING UP FROM CHAIR USING ARMS: A LITTLE
WALKING IN HOSPITAL ROOM: A LITTLE

## 2024-04-26 ASSESSMENT — PAIN - FUNCTIONAL ASSESSMENT
PAIN_FUNCTIONAL_ASSESSMENT: 0-10
PAIN_FUNCTIONAL_ASSESSMENT: 0-10

## 2024-04-26 ASSESSMENT — PAIN DESCRIPTION - DESCRIPTORS: DESCRIPTORS: DISCOMFORT

## 2024-04-26 ASSESSMENT — PAIN SCALES - GENERAL
PAINLEVEL_OUTOF10: 2
PAINLEVEL_OUTOF10: 4

## 2024-04-26 NOTE — CARE PLAN
The patient's goals for the shift include      The clinical goals for the shift include Patient will have a bowel movement during the shift.    No falls or injury this shift. Patient had a small bowel movement this morning. Patient is being discharged to home.

## 2024-04-26 NOTE — PROGRESS NOTES
04/26/24 1115   Discharge Planning   Living Arrangements Spouse/significant other   Support Systems Spouse/significant other   Assistance Needed None   Type of Residence Private residence   Number of Stairs to Enter Residence 2   Number of Stairs Within Residence 0   Do you have animals or pets at home? No   Who is requesting discharge planning? Provider   Home or Post Acute Services None   Patient expects to be discharged to: Home   Does the patient need discharge transport arranged? No     Spoke with pt via phone due to working remote and verified discharge plans remained home no needs and her  will be her ride home. [facility] 20/21. ADOD is today. CT to follow. Jaylin Epstein BSN/RN-[facility]

## 2024-04-26 NOTE — CARE PLAN
Problem: Pain  Goal: Takes deep breaths with improved pain control throughout the shift  Outcome: Progressing  Goal: Turns in bed with improved pain control throughout the shift  Outcome: Progressing  Goal: Walks with improved pain control throughout the shift  Outcome: Progressing  Goal: Performs ADL's with improved pain control throughout shift  Outcome: Progressing  Goal: Participates in PT with improved pain control throughout the shift  Outcome: Progressing  Goal: Free from opioid side effects throughout the shift  Outcome: Progressing  Goal: Free from acute confusion related to pain meds throughout the shift  Outcome: Progressing     Problem: Safety - Adult  Goal: Free from fall injury  Outcome: Progressing     Problem: Discharge Planning  Goal: Discharge to home or other facility with appropriate resources  Outcome: Progressing     Problem: Skin  Goal: Decreased wound size/increased tissue granulation at next dressing change  Outcome: Progressing  Goal: Promote skin healing  Outcome: Progressing     Problem: Daily Care  Goal: Daily care needs are met  Outcome: Progressing     Problem: Psychosocial Needs  Goal: Demonstrates ability to cope with hospitalization/illness  Outcome: Progressing  Goal: Collaborate with me, my family, and caregiver to identify my specific goals  Outcome: Progressing     Problem: Discharge Barriers  Goal: My discharge needs are met  Outcome: Progressing   The patient's goals for the shift include      The clinical goals for the shift include Patient will have a bowel movment during shift    Over the shift, the patient did make progress toward the following goals.

## 2024-04-26 NOTE — DISCHARGE SUMMARY
Discharge Diagnosis  Colon cancer (Multi)    Issues Requiring Follow-Up  Post-op follow up    Test Results Pending At Discharge  Pending Labs       No current pending labs.            Hospital Course   Patient is a 58 year old female with history of loop colostomy for neoplasm of uncertain behavior who presented for colostomy take down. She tolerated the procedure well. Post-operatively her pain was well controlled. She was started on a clear liquid diet and advanced as tolerated. She was having bowel function. She was ambulating without issue. At this time she is appropriate to discharge home.     Pertinent Physical Exam At Time of Discharge  Physical Exam  Vitals reviewed.   Constitutional:       General: She is not in acute distress.  HENT:      Head: Normocephalic and atraumatic.   Eyes:      Conjunctiva/sclera: Conjunctivae normal.   Cardiovascular:      Rate and Rhythm: Normal rate and regular rhythm.      Pulses: Normal pulses.   Pulmonary:      Effort: Pulmonary effort is normal. No respiratory distress.   Abdominal:      General: There is no distension.      Palpations: Abdomen is soft.      Tenderness: There is no abdominal tenderness.      Comments: Incision well approximated with staples without erythema or drainage. Sherie removed.    Musculoskeletal:         General: No swelling. Normal range of motion.      Cervical back: Neck supple.   Skin:     General: Skin is warm and dry.   Neurological:      General: No focal deficit present.      Mental Status: She is alert and oriented to person, place, and time.         Home Medications     Medication List      START taking these medications     docusate sodium 100 mg capsule; Commonly known as: Colace; Take 1   capsule (100 mg) by mouth 2 times a day as needed for constipation for up   to 7 days.   oxyCODONE 5 mg immediate release tablet; Commonly known as: Roxicodone;   Take 1 tablet (5 mg) by mouth every 6 hours if needed for severe pain (7 -   10).      CONTINUE taking these medications     cyanocobalamin 50 mcg tablet; Commonly known as: Vitamin B-12   erythromycin base 500 mg tablet; Commonly known as: E-Mycin   levothyroxine 25 mcg tablet; Commonly known as: Synthroid, Levoxyl   magnesium chloride 64 mg EC tablet; Commonly known as: MagDelay   multivitamin tablet     STOP taking these medications     gabapentin 300 mg capsule; Commonly known as: Neurontin       Outpatient Follow-Up  Future Appointments   Date Time Provider Department Center   7/30/2024  1:30 PM Celsa Lepe MD [id number]       Sita Flores DO

## 2024-04-26 NOTE — NURSING NOTE
Patient discharged to home. Discharge instructions were reviewed with the patient. Patient's IV access was removed prior to patient leaving the unit. Patient left with all of her belongings.

## 2024-05-01 NOTE — OP NOTE
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.4]     Procedures  LOOP TRANSVERSE OSTOMY CLOSURE (gusz to assist)  89127 - OR CLOSURE ENTEROSTOMY LG/SMALL INTESTINE    LOOP TRANSVERSE OSTOMY CLOSURE (gusz to assist)  69053 - OR CLSR NTRSTM LG/SM RESCJ & COLORECTAL ANASTOMOSIS    Colonoscopy  56101 - OR COLONOSCOPY FLX DX W/COLLJ SPEC WHEN PFRMD      Surgeons   Panel 1:     * Estrellita Nguyen - Primary  Panel 2:     * Estrellita Nguyen - Primary    Resident/Fellow/Other Assistant:  Surgeons and Role:  * No surgeons found with a matching role *    Procedure Summary  Anesthesia: General  ASA: II  Anesthesia Staff: CRNA: HENNA Brooks  Estimated Blood Loss: 2mL  Intra-op Medications:   Administrations occurring from 1200 to 1530 on 24:   Medication Name Total Dose   BUPivacaine HCl (Marcaine) 0.25 % (2.5 mg/mL) injection 10 mL   ceFAZolin in dextrose (iso-os) (Ancef) IVPB 1 g 1 g   HYDROmorphone (Dilaudid) injection 0.5 mg 0.5 mg   HYDROmorphone (Dilaudid) injection 0.5 mg 0.5 mg   lactated Ringer's infusion Cannot be calculated              Anesthesia Record               Intraprocedure I/O Totals          Intake    lactated Ringer's infusion 1000.00 mL    Total Intake 1000 mL          Specimen: No specimens collected     Staff:   Circulator: Paloma Terrazas RN; Ronald Juarez RN  Scrub Person: Elizabeth Sanz; Ingrid Shaw         Drains and/or Catheters:   [REMOVED] Colostomy Loop LLQ (Removed)   Stomal Appliance Clean;Dry;Intact 24 1120       Tourniquet Times:         Implants:     Findings: this is already dictated    Indications: Xochitl Pichardo is an 58 y.o. 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     The patient was seen in the preoperative area. The risks, benefits, complications, treatment options, non-operative alternatives, expected recovery and outcomes were discussed with the patient. The possibilities of reaction to medication, pulmonary aspiration, injury to surrounding structures, bleeding, recurrent infection, the need for additional procedures, failure to diagnose a condition, and creating a complication requiring transfusion or operation were discussed with the patient. The patient concurred with the proposed plan, giving informed consent.  The site of surgery was properly noted/marked if necessary per policy. The patient has been actively warmed in preoperative area. Preoperative antibiotics have been ordered and given within 1 hours of incision. Venous thrombosis prophylaxis have been ordered including bilateral sequential compression devices    Procedure Details: done already  Complications:  None; patient tolerated the procedure well.    Disposition: PACU - hemodynamically stable.  Condition: stable         Additional Details: duplicate    Attending Attestation: A qualified resident physician was not available.    Estrellita Nguyen  Phone Number: [phone]

## 2024-05-14 NOTE — DOCUMENTATION CLARIFICATION NOTE
"    PATIENT:               JOS HAMEED  ACCT #:                  [id number]  MRN:                       [id number]  :                       1965  ADMIT DATE:       2024 10:31 AM  DISCH DATE:        2024 12:07 PM  RESPONDING PROVIDER #:        [id number]          PROVIDER RESPONSE TEXT:    Sigmoid Colon Cancer    CDI QUERY TEXT:    Clarification        Instruction:    Based on your assessment of the patient and the clinical information, please provide the requested documentation by clicking on the appropriate radio button and enter any additional information if prompted.    Question: Based on your medical judgment, can you please clarify which of these conditions is the most clinically supported    When answering this query, please exercise your independent professional judgment. The fact that a question is being asked, does not imply that any particular answer is desired or expected.    The patient's clinical indicators include:  Clinical Information: There is conflicting documentation in the medical record which requires clarification.    -The diagnosis of neoplasm of uncertain behavior was documented on the Dc summary under hospital course    -The diagnosis of Colon cancer was documented on the Dc summary as DC diagnosis      Clinical Indicators:  - Labs from prior visit. Surgical Pathology collected 2023 reviewed by pathology Dr Ware: \" SIGMOID COLON, SEGMENTAL RESECTION: INVASIVE ADENOCARCINOMA OF COLON, SEE SYNOPTIC REPORT.  TATTOO AREA PRESENT, CONSISTENT WITH PRIOR BIOPSY SITE.  TUBULOVILLOUS ADENOMA\"      Treatment: Pt had surgical resection in 2023. Current visit for ostomy reversal.    Risk Factors: Colon neoplasm s/p removal, colostomy.  Options provided:  -- Sigmoid Colon Cancer  -- Personal history of sigmoid colon cancer  -- Other - I will add my own diagnosis  -- Refer to Clinical Documentation Reviewer    Query created by: Kellie Garces on 2024 12:19 " PM      Electronically signed by:  BETHANY RAMIREZ MD 5/14/2024 8:57 AM

## 2024-07-30 ENCOUNTER — OFFICE VISIT (OUTPATIENT)
Dept: HEMATOLOGY/ONCOLOGY | Facility: CLINIC | Age: 59
End: 2024-07-30
Payer: COMMERCIAL

## 2024-07-30 VITALS
TEMPERATURE: 94.6 F | DIASTOLIC BLOOD PRESSURE: 100 MMHG | BODY MASS INDEX: 30.64 KG/M2 | HEART RATE: 118 BPM | RESPIRATION RATE: 16 BRPM | SYSTOLIC BLOOD PRESSURE: 134 MMHG | HEIGHT: 67 IN | WEIGHT: 195.22 LBS | OXYGEN SATURATION: 95 %

## 2024-07-30 DIAGNOSIS — C18.7 CANCER OF SIGMOID COLON (MULTI): ICD-10-CM

## 2024-07-30 LAB
ALBUMIN SERPL BCP-MCNC: 4.2 G/DL (ref 3.4–5)
ALP SERPL-CCNC: 58 U/L (ref 33–110)
ALT SERPL W P-5'-P-CCNC: 14 U/L (ref 7–45)
ANION GAP SERPL CALC-SCNC: 11 MMOL/L (ref 10–20)
AST SERPL W P-5'-P-CCNC: 16 U/L (ref 9–39)
BASOPHILS # BLD AUTO: 0.06 X10*3/UL (ref 0–0.1)
BASOPHILS NFR BLD AUTO: 0.8 %
BILIRUB SERPL-MCNC: 0.6 MG/DL (ref 0–1.2)
BUN SERPL-MCNC: 22 MG/DL (ref 6–23)
CALCIUM SERPL-MCNC: 9.7 MG/DL (ref 8.6–10.3)
CEA SERPL-MCNC: 1.1 UG/L
CHLORIDE SERPL-SCNC: 104 MMOL/L (ref 98–107)
CO2 SERPL-SCNC: 27 MMOL/L (ref 21–32)
CREAT SERPL-MCNC: 0.89 MG/DL (ref 0.5–1.05)
EGFRCR SERPLBLD CKD-EPI 2021: 75 ML/MIN/1.73M*2
EOSINOPHIL # BLD AUTO: 0.58 X10*3/UL (ref 0–0.7)
EOSINOPHIL NFR BLD AUTO: 7.7 %
ERYTHROCYTE [DISTWIDTH] IN BLOOD BY AUTOMATED COUNT: 13.2 % (ref 11.5–14.5)
GLUCOSE SERPL-MCNC: 93 MG/DL (ref 74–99)
HCT VFR BLD AUTO: 44 % (ref 36–46)
HGB BLD-MCNC: 14.6 G/DL (ref 12–16)
IMM GRANULOCYTES # BLD AUTO: 0.02 X10*3/UL (ref 0–0.7)
IMM GRANULOCYTES NFR BLD AUTO: 0.3 % (ref 0–0.9)
LYMPHOCYTES # BLD AUTO: 2.18 X10*3/UL (ref 1.2–4.8)
LYMPHOCYTES NFR BLD AUTO: 29.1 %
MCH RBC QN AUTO: 30 PG (ref 26–34)
MCHC RBC AUTO-ENTMCNC: 33.2 G/DL (ref 32–36)
MCV RBC AUTO: 90 FL (ref 80–100)
MONOCYTES # BLD AUTO: 0.56 X10*3/UL (ref 0.1–1)
MONOCYTES NFR BLD AUTO: 7.5 %
NEUTROPHILS # BLD AUTO: 4.1 X10*3/UL (ref 1.2–7.7)
NEUTROPHILS NFR BLD AUTO: 54.6 %
PLATELET # BLD AUTO: 231 X10*3/UL (ref 150–450)
POTASSIUM SERPL-SCNC: 3.5 MMOL/L (ref 3.5–5.3)
PROT SERPL-MCNC: 7.1 G/DL (ref 6.4–8.2)
RBC # BLD AUTO: 4.87 X10*6/UL (ref 4–5.2)
SODIUM SERPL-SCNC: 138 MMOL/L (ref 136–145)
WBC # BLD AUTO: 7.5 X10*3/UL (ref 4.4–11.3)

## 2024-07-30 PROCEDURE — 36415 COLL VENOUS BLD VENIPUNCTURE: CPT | Performed by: INTERNAL MEDICINE

## 2024-07-30 PROCEDURE — 85025 COMPLETE CBC W/AUTO DIFF WBC: CPT | Performed by: INTERNAL MEDICINE

## 2024-07-30 PROCEDURE — 3008F BODY MASS INDEX DOCD: CPT | Performed by: INTERNAL MEDICINE

## 2024-07-30 PROCEDURE — 82378 CARCINOEMBRYONIC ANTIGEN: CPT | Mod: PORLAB | Performed by: INTERNAL MEDICINE

## 2024-07-30 PROCEDURE — 80053 COMPREHEN METABOLIC PANEL: CPT | Performed by: INTERNAL MEDICINE

## 2024-07-30 PROCEDURE — 99214 OFFICE O/P EST MOD 30 MIN: CPT | Performed by: INTERNAL MEDICINE

## 2024-07-30 ASSESSMENT — NCCN CANCER DISTRESS MANAGEMENT
NCCN PHYSICAL CONCERNS: 1
NCCN PHYSICAL CONCERNS: 2

## 2024-07-30 ASSESSMENT — PAIN SCALES - GENERAL: PAINLEVEL: 0-NO PAIN

## 2024-07-30 NOTE — PATIENT INSTRUCTIONS
Follow up visit for history of colon cancer diagnosed on 2023.     CT scan to be completed in 4 months     Return in 6 months for routine follow up.

## 2024-07-30 NOTE — PROGRESS NOTES
Xochitl Pichardo is a 58 y.o. female evaluated for colon cancer of sigmoid colon.  Oncology history, treatment  #1 colon cancer,   Stage IIa, pT3 pN0 M0.  MISMATCH REPAIR PROTEIN EXPRESSION  Block A4                 Protein:  Result                    MLH-1:             Expression Present                                                       PMS-2:             Expression Present                                                       MSH-2:             Expression Present                                                       MSH-6:             Expression Present    December 7, 2023, status post sigmoid colectomy and final pathology did show moderately differentiated adenocarcinoma, size 4 x 3.3 cm,     tumor was extended pericolonic tissue, no evidence of lymphovascular and perineural invasion, margins negative, 20 lymph node examined and all negative for metastatic disease.  1/25/24 , PET , 1. Postsurgical changes status post sigmoid colectomy without evidence of FDG avid local recurrence or metastatic disease. 2. Diffusely enlarged thyroid gland with intense FDG uptake which may be seen in the setting of thyroiditis.  Clinically follow-up         Subjective   Patient is a 58-year-old female who has a history of left lower abdominal pain and CAT scan of abdominal pelvis done in December 2022 patient did show a circumferential masslike thickening of the short segment of sigmoid colon.    December 14, 2022 colonoscopy done and an obstructing sigmoid colon mass was seen biopsy nondiagnostic patient was offered surgical resection and patient refused at that time.   In October 2023, patient  presented with an obstructing sigmoid colon mass.  Patient was taken to the operating room for decompression/diversion.  Patient underwent diverting loop transverse colostomy.  October 13, 2023, CAT scan of abdominal pelvis done which did show Interval development of colonic obstruction with diffuse dilatation of the colon and transition  point in the sigmoid colon at site of previously described colonic mass concerning for colonic malignancy as described on the 12/6/2022 CT examination. Interval increase in size of 5.5 cm x 3.4 center cystic lesion in the left posterior pelvis;   December 7, 2023, status post sigmoid colectomy and final pathology did show moderately differentiated adenocarcinoma, size 4 x 3.3 cm,Weight: 88.6 kg (195 lb 3.5 oz)    tumor was extended pericolonic tissue, no evidence of lymphovascular and perineural invasion, margins negative, 20 lymph node examined and all negative for metastatic disease.    Stage IIa, pT3 pN0 M0.  MISMATCH REPAIR PROTEIN EXPRESSION  Block A4                 Protein:  Result                    MLH-1:             Expression Present                                                       PMS-2:             Expression Present                                                       MSH-2:             Expression Present                                                       MSH-6:             Expression Present                                           INTERPRETATION: Neoplasm with normal mismatch repair protein expression.  Medical oncology consultation    Objective /interval history  Patient comes for follow-up visit.  Doing very well, no nausea vomiting, no abdominal pain,  3 days ago patient developed abdominal pain nausea vomiting after taking solid.  No she is feeling okay.  She has normal to bowel movement every day no rectal bleed.  Colonoscopy done in April 2024 was okay  ROS  Unremarkable    Past Medical History:   Diagnosis Date    Colon cancer (Multi)     Dental disease     chipped    Hypothyroidism     Murmur     Other specified disorders of the skin and subcutaneous tissue 04/13/2019    Morgellons syndrome    Other specified disorders of the skin and subcutaneous tissue 04/23/2019    Morgellons syndrome    Vision loss       Past Surgical History:   Procedure Laterality Date    COLONOSCOPY       "OTHER SURGICAL HISTORY  10/14/2023    loop ostomy        Family History   Problem Relation Name Age of Onset    Parkinsonism Mother      Heart disease Father      Heart disease Brother      Heart disease Paternal Grandfather        Social History     Tobacco Use   Smoking Status Never   Smokeless Tobacco Never              Current Outpatient Medications:     multivitamin tablet, Take 1 tablet by mouth once daily., Disp: , Rfl:     cyanocobalamin (Vitamin B-12) 50 mcg tablet, Take 1 tablet (50 mcg) by mouth once daily., Disp: , Rfl:     erythromycin base (E-Mycin) 500 mg tablet, 1 tablet (500 mg)., Disp: , Rfl:     levothyroxine (Synthroid, Levoxyl) 25 mcg tablet, Take 1 tablet (25 mcg) by mouth once daily., Disp: , Rfl:     magnesium chloride (MagDelay) 64 mg EC tablet, Take 1 tablet (64 mg) by mouth., Disp: , Rfl:     oxyCODONE (Roxicodone) 5 mg immediate release tablet, Take 1 tablet (5 mg) by mouth every 6 hours if needed for severe pain (7 - 10). (Patient not taking: Reported on 7/30/2024), Disp: 10 tablet, Rfl: 0    Current Facility-Administered Medications:     dextrose 5 % and lactated Ringer's infusion, 100 mL/hr, intravenous, Continuous, Estrellita Nguyen MD      Last Recorded Vitals  Blood pressure (!) 134/100, pulse (!) 118, temperature 34.8 °C (94.6 °F), resp. rate 16, height 1.704 m (5' 7.09\"), weight 88.6 kg (195 lb 3.5 oz), SpO2 95%.   Physical Exam  Vitals reviewed.   Constitutional:       Appearance: Normal appearance.   HENT:      Head: Normocephalic and atraumatic.      Nose: Nose normal.   Eyes:      Extraocular Movements: Extraocular movements intact.      Conjunctiva/sclera: Conjunctivae normal.      Pupils: Pupils are equal, round, and reactive to light.   Cardiovascular:      Rate and Rhythm: Normal rate and regular rhythm.   Pulmonary:      Effort: Pulmonary effort is normal.      Breath sounds: Normal breath sounds.   Abdominal:      General: Abdomen is flat.      Palpations: Abdomen is " soft.  Slight tenderness in the right flank area, no mass       Musculoskeletal:         General: Normal range of motion.      Cervical back: Normal range of motion and neck supple.   Lymphadenopathy:      Comments: No peripheral lymphadenopathy   Skin:     General: Skin is warm and dry.   Neurological:      General: No focal deficit present.      Mental Status: She is alert and oriented to person, place, and time.   Psychiatric:         Mood and Affect: Mood normal.   Relevant Results            Lab Results   Component Value Date    WBC 7.5 07/30/2024    HGB 14.6 07/30/2024    HCT 44.0 07/30/2024     07/30/2024    ALT 11 12/05/2023    AST 13 12/05/2023     (L) 04/24/2024    K 4.1 04/24/2024     04/24/2024    CREATININE 0.76 04/24/2024    BUN 15 04/24/2024    CO2 17 (L) 04/24/2024    TSH 5.05 (H) 11/15/2022      Assessment/Plan   #1, sigmoid colon cancer ,moderately differentiated adenocarcinoma, size 4 x 3.3 cm,    Throat muscularis propria into pericolonic tissue, no evidence of lymphovascular and perineural invasion, margins negative, 20 lymph node examined and all negative for metastatic disease.    Stage IIa, pT3 pN0 M0.   MISMATCH REPAIR PROTEIN EXPRESSION   Block A4                  Protein:  Result                     MLH-1:             Expression Present                                                        PMS-2:             Expression Present                                                        MSH-2:             Expression Present                                                        MSH-6:             Expression Present                                          1/25/24 , PET , no evidence of metastatic disease     7/30/24  Stage II colon cancer, T3 N0 M0 stage IV dissection.  Clinically follow-up.  Patient doing very well.  Feeding examination did show some mild tenderness right flank area patient also with a history of gastroenteritis which could be due to food poisoning.   Colonoscopy resulted reviewed and discussed with the patient.    I will schedule for CAT scan abdominal pelvis after 4 months follow-up after 6 months.         Time spent 30 minutes.    Celsa Lepe MD

## 2024-11-08 DIAGNOSIS — Z12.31 BREAST CANCER SCREENING BY MAMMOGRAM: Primary | ICD-10-CM

## 2024-12-03 ENCOUNTER — HOSPITAL ENCOUNTER (OUTPATIENT)
Dept: RADIOLOGY | Facility: HOSPITAL | Age: 59
Discharge: HOME | End: 2024-12-03
Payer: COMMERCIAL

## 2024-12-03 DIAGNOSIS — C18.7 CANCER OF SIGMOID COLON (MULTI): ICD-10-CM

## 2024-12-03 LAB
CREAT SERPL-MCNC: 1.34 MG/DL (ref 0.6–1.3)
GFR SERPL CREATININE-BSD FRML MDRD: 46 ML/MIN/1.73M*2

## 2024-12-03 PROCEDURE — 74177 CT ABD & PELVIS W/CONTRAST: CPT

## 2024-12-03 PROCEDURE — 82565 ASSAY OF CREATININE: CPT

## 2024-12-03 PROCEDURE — 2550000001 HC RX 255 CONTRASTS: Performed by: INTERNAL MEDICINE

## 2024-12-05 ENCOUNTER — TELEPHONE (OUTPATIENT)
Dept: HEMATOLOGY/ONCOLOGY | Facility: CLINIC | Age: 59
End: 2024-12-05
Payer: COMMERCIAL

## 2024-12-05 NOTE — TELEPHONE ENCOUNTER
Received call from  radiology with critical results on CT abdomen/pelvis. Results reviewed with Dr. Crane who will speak with pt.

## 2024-12-06 DIAGNOSIS — N83.209 CYST OF OVARY, UNSPECIFIED LATERALITY: Primary | ICD-10-CM

## 2024-12-09 ENCOUNTER — TELEPHONE (OUTPATIENT)
Dept: OBSTETRICS AND GYNECOLOGY | Facility: CLINIC | Age: 59
End: 2024-12-09
Payer: COMMERCIAL

## 2024-12-09 DIAGNOSIS — C18.9 MALIGNANT NEOPLASM OF COLON, UNSPECIFIED PART OF COLON (MULTI): ICD-10-CM

## 2024-12-09 DIAGNOSIS — R10.814 ABDOMINAL TENDERNESS OF LEFT LOWER QUADRANT, REBOUND TENDERNESS PRESENCE NOT SPECIFIED: ICD-10-CM

## 2024-12-09 NOTE — TELEPHONE ENCOUNTER
This is a new patient being reffered by Dr. Lepe from [facility] please look at her CT and tell me where to put her

## 2024-12-09 NOTE — TELEPHONE ENCOUNTER
Received call from pt stating she could not understand Dr. Lepe's voicemail. Explained that message from Dr. Lepe was that her CT scan on 12/3/24 showed pelvic mass that he feels is a cyst, however, is referring her to gynecology for further evaluation. Explained that Dr. Lepe entered referral and  will schedule appointment and call her with that information. Pt asked about [email or web address] access. Explained can go to  website, [email or web address] and request access through that. Pt voiced understanding and denied additional questions at this time.

## 2024-12-16 ENCOUNTER — LAB (OUTPATIENT)
Dept: LAB | Facility: LAB | Age: 59
End: 2024-12-16
Payer: COMMERCIAL

## 2024-12-16 ENCOUNTER — HOSPITAL ENCOUNTER (OUTPATIENT)
Dept: RADIOLOGY | Facility: HOSPITAL | Age: 59
Discharge: HOME | End: 2024-12-16
Payer: COMMERCIAL

## 2024-12-16 DIAGNOSIS — C18.9 MALIGNANT NEOPLASM OF COLON, UNSPECIFIED PART OF COLON (MULTI): ICD-10-CM

## 2024-12-16 DIAGNOSIS — R10.814 ABDOMINAL TENDERNESS OF LEFT LOWER QUADRANT, REBOUND TENDERNESS PRESENCE NOT SPECIFIED: ICD-10-CM

## 2024-12-16 LAB — CANCER AG125 SERPL-ACNC: 21.4 U/ML (ref 0–30.2)

## 2024-12-16 PROCEDURE — 76830 TRANSVAGINAL US NON-OB: CPT | Performed by: RADIOLOGY

## 2024-12-16 PROCEDURE — 86304 IMMUNOASSAY TUMOR CA 125: CPT

## 2024-12-16 PROCEDURE — 76856 US EXAM PELVIC COMPLETE: CPT | Performed by: RADIOLOGY

## 2024-12-16 PROCEDURE — 36415 COLL VENOUS BLD VENIPUNCTURE: CPT

## 2024-12-16 PROCEDURE — 76856 US EXAM PELVIC COMPLETE: CPT

## 2024-12-17 ENCOUNTER — APPOINTMENT (OUTPATIENT)
Dept: OBSTETRICS AND GYNECOLOGY | Facility: CLINIC | Age: 59
End: 2024-12-17
Payer: COMMERCIAL

## 2024-12-17 VITALS
WEIGHT: 197.4 LBS | HEIGHT: 68 IN | BODY MASS INDEX: 29.92 KG/M2 | SYSTOLIC BLOOD PRESSURE: 150 MMHG | DIASTOLIC BLOOD PRESSURE: 100 MMHG

## 2024-12-17 DIAGNOSIS — Z85.038 HISTORY OF COLON CANCER: ICD-10-CM

## 2024-12-17 DIAGNOSIS — N83.209 CYST OF OVARY, UNSPECIFIED LATERALITY: ICD-10-CM

## 2024-12-17 DIAGNOSIS — R19.00 PELVIC MASS: Primary | ICD-10-CM

## 2024-12-17 PROCEDURE — 99204 OFFICE O/P NEW MOD 45 MIN: CPT | Performed by: OBSTETRICS & GYNECOLOGY

## 2024-12-17 PROCEDURE — 3008F BODY MASS INDEX DOCD: CPT | Performed by: OBSTETRICS & GYNECOLOGY

## 2024-12-17 NOTE — PROGRESS NOTES
Subjective   Patient ID: Xochitl Pichardo is a 58 y.o. female who presents for No chief complaint on file..  HPI 58 years old with a recent history of colon cancer status post surgery with colostomy and then reversal who has been under care of Dr. HARRIS and was referred to me due to recent CT scan showing a large cystic mass arising from left ovary.  She is doing well occasionally feels some pressure in the pelvis but denies any vaginal bleeding denies any other concern.  Her pelvic ultrasound results were not amenable at the time of her visit.  Prior to coming in she had a  level done which was normal.    Review of Systems   All other systems reviewed and are negative.      Objective   Physical Exam  Constitutional:       Appearance: Normal appearance.   Pulmonary:      Effort: Pulmonary effort is normal.   Neurological:      Mental Status: She is alert.   Psychiatric:         Mood and Affect: Mood normal.         Assessment/Plan   Problem List Items Addressed This Visit    None  Visit Diagnoses         Codes    Cyst of ovary, unspecified laterality     N83.209         negative.   I have recommended that we consult GYN oncology as well as discussing her case with Dr. Sampson.  The  general surgeon who did her bowel surgery and possibly repeating  in 3 to 4 weeks prior to any surgery.  She most likely will need surgery with removal of both ovaries and tubes.  Patient was asking if she needed a hysterectomy and since she is asymptomatic with no postmenopausal bleeding and history of multiple surgery in the past I have recommended against hysterectomy..    12-   I have reviewed her CT scans, pelvic U/S and discussed the case with Dr. Sampson and Miko and per Dr. Crouch's recommendation will refer her to them GYN oncology for further work up and possible Total hysterectomy and BSO.   Patient notified.          Quoc Rider MD 12/17/24 5:18 PM

## 2025-01-14 ENCOUNTER — OFFICE VISIT (OUTPATIENT)
Dept: GYNECOLOGIC ONCOLOGY | Facility: HOSPITAL | Age: 60
End: 2025-01-14
Payer: COMMERCIAL

## 2025-01-14 VITALS
SYSTOLIC BLOOD PRESSURE: 132 MMHG | WEIGHT: 194 LBS | HEART RATE: 108 BPM | DIASTOLIC BLOOD PRESSURE: 85 MMHG | BODY MASS INDEX: 29.4 KG/M2

## 2025-01-14 DIAGNOSIS — R19.00 PELVIC MASS: ICD-10-CM

## 2025-01-14 DIAGNOSIS — Z85.038 HISTORY OF COLON CANCER: ICD-10-CM

## 2025-01-14 DIAGNOSIS — N83.209 CYST OF OVARY, UNSPECIFIED LATERALITY: ICD-10-CM

## 2025-01-14 PROCEDURE — 99214 OFFICE O/P EST MOD 30 MIN: CPT | Performed by: OBSTETRICS & GYNECOLOGY

## 2025-01-14 PROCEDURE — 88300 SURGICAL PATH GROSS: CPT | Mod: TC,SUR | Performed by: OBSTETRICS & GYNECOLOGY

## 2025-01-14 PROCEDURE — 88300 SURGICAL PATH GROSS: CPT | Performed by: PATHOLOGY

## 2025-01-14 ASSESSMENT — ENCOUNTER SYMPTOMS
UNEXPECTED WEIGHT CHANGE: 0
ABDOMINAL PAIN: 1
ABDOMINAL DISTENTION: 0
FEVER: 0
SHORTNESS OF BREATH: 0
DIARRHEA: 0
DIFFICULTY URINATING: 0
CONSTIPATION: 0
APPETITE CHANGE: 0
NAUSEA: 0

## 2025-01-14 ASSESSMENT — PAIN SCALES - GENERAL: PAINLEVEL_OUTOF10: 0-NO PAIN

## 2025-01-14 NOTE — PROGRESS NOTES
Patient ID: Xochitl Pichardo is a 59 y.o. female.  Referring Physician: Quoc Rider MD  [address] [facility] [address]  [place] [id number]  Primary Care Provider: No Assigned PCP Generic Provider, MD Zarate    Xochitl is a 59y  postmenopausal female here as a referral for a pelvic mass in the setting of a history of colon cancer. Cyst was first noted om , 3cm in size. This was also when her colonic mass was first noted. When she had surgery in , the cyst was noted to be 5cm. On a surveillance CT last month, cyst was noted to be 9.8 x 11.5 x 12.5 cm. CA-125 normal.     Today she reports that she feels a pressure sensation when she has bowel movements but otherwise is asymptomatic from this mass. She has intermittent abdominal pain that does not require any pain medications. Denies any PMB, no abnormal discharge. Good appetite, no unintentional weight loss. No urinary complaints. No fatigue, fever/chills/CP.       OBHx: SVDx3, PP 9#8  GynHx:   Menarche: 13  Menopause: age 53, had regular menses prior to that, no PMB, denies any abnormal pap smear, never any contraception or anything to regulate periods   MedHx: colon cancer  Shx: sigmoid colectomy     Cyst history:   TVUS 24: Uterus 5cm x 3.8cm x 7.3cm. Retroflexed uterus. Echogenic nodule in the anterior myometrium similar the prior exam measuring 10 x 5 x 8 mm. This may represent fibroid. EL 6mm, fluid in canal. Slightly complex cystic structure in the right adnexa likely arising from the right ovary. This is approximately 12.5cm x 10.9cm x 14.6cm and demonstrates normal flow. This is larger than the prior exam. The left ovary is not seen.    CTAP 12/3/24: Dome of the bladder is indented by a midline cystic mass which measures 9.8 x 11.5 x 12.5 cm. The mass is homogeneous in density with no enhancing or solid component  noted. Right ovary is identified and appears to be separate from the mass, but a separate left ovary is not definitely identified in  the mass could arise from the left ovary. Uterus is retroflexed in the cystic mass pushes on the uterus and rectosigmoid colon in addition to the bladder.    TVUS 3/8/24: The uterus measures 6.4 x 3.4 x 4.6 cm in diameter. The double wall  endometrial thickness is 2 mm. There is trace fluid in the endocervical canal. There is an ovoid 7 x 4 mm hyperechoic mass at the lower uterine segment adjacent to the endometrium. The right ovary measures 5.5 x 4.8 x 5.7 cm in diameter.. There is a 4.7 x 5.4 x 5.5 cm cyst in the right ovary with some internal debris. There is a thin uniform wall. Doppler interrogation of the right ovary reveals normal arterial activity. The left ovary is not visualized.    CTAP 10/13/23: Interval increase in size of 5.5 cm x 3.4 center cystic lesion in the left posterior pelvis; follow-up nonemergent pelvic ultrasound is recommended for further evaluation.    CTAP 12/2022: Likely a left ovarian cyst measuring approximately 3 cm in diameter.     Review of Systems   Constitutional:  Negative for appetite change, fever and unexpected weight change.   Respiratory:  Negative for shortness of breath.    Cardiovascular:  Negative for chest pain.   Gastrointestinal:  Positive for abdominal pain. Negative for abdominal distention, constipation, diarrhea and nausea.   Genitourinary:  Positive for pelvic pain. Negative for difficulty urinating, vaginal bleeding and vaginal discharge.         Objective   BSA: There is no height or weight on file to calculate BSA.  There were no vitals taken for this visit.     Family History   Problem Relation Name Age of Onset    Parkinsonism Mother      Miranda Parkinson White syndrome Mother      Heart disease Father      Heart disease Brother      Heart disease Paternal Grandfather         Xochitl Pichardo  reports that she has never smoked. She has never used smokeless tobacco.  She  reports no history of alcohol use.  She  reports no history of drug use.    Physical  Exam  Exam conducted with a chaperone present.   Constitutional:       Appearance: Normal appearance.   Cardiovascular:      Rate and Rhythm: Normal rate and regular rhythm.   Pulmonary:      Effort: Pulmonary effort is normal.      Breath sounds: Normal breath sounds.   Abdominal:      General: Abdomen is flat. There is no distension.      Palpations: Abdomen is soft.      Tenderness: There is no abdominal tenderness. There is no guarding or rebound.          Comments: Midline vertical incision noted, well healed in addition to left midline horizontal incision for prior ostomy    Genitourinary:     General: Normal vulva.      Exam position: Lithotomy position.      Vagina: Normal.      Cervix: Normal.      Uterus: Normal. Not enlarged, not fixed and not tender.       Comments: Mass noted with fullness noted posterior to the uterus, mobile  Neurological:      Mental Status: She is alert.         Performance Status:  Asymptomatic    Assessment/Plan      Oncology History    No history exists.          Problem List Items Addressed This Visit    None  Visit Diagnoses         Codes    Cyst of ovary, unspecified laterality     N83.209    Pelvic mass     R19.00    History of colon cancer     Z85.038            Treatment Plans       No treatment plans exist          59y  postmenopausal female here as a referral for a pelvic mass in the setting of a prior history of colon cancer.     Pelvic mass  -noted since , has been slowly enlarging  -discussed mass could be benign, borderline, or malignant   -CA-125 normal, reassuring imaging findings, imaging history as above  -mass palpable on exam today, mobile  -recommended L/S BSO with frozen section, possible staging, possible laparotomy  -also counseled on risk of concurrent hysterectomy pending EMB/frozen section biopsies, pt amenable  -will need PAT  Thickened EL  -seen on TVUS, no PMB per patient  -given findings and upcoming surgery, recommended EMB today in the  office, pt amenable  -will plan on phone visit once pathology back to assess for concurrent hysterectomy   Colon cancer  -follows with Dr. Sherley Nova, no adjuvant treatment recommended       Patient seen and discussed with Dr. Crouch.     Asa Gonzalez MD  Gynecologic Oncology Fellow

## 2025-01-21 LAB
LABORATORY COMMENT REPORT: NORMAL
PATH REPORT.FINAL DX SPEC: NORMAL
PATH REPORT.GROSS SPEC: NORMAL
PATH REPORT.RELEVANT HX SPEC: NORMAL
PATH REPORT.TOTAL CANCER: NORMAL

## 2025-01-30 ENCOUNTER — APPOINTMENT (OUTPATIENT)
Dept: HEMATOLOGY/ONCOLOGY | Facility: CLINIC | Age: 60
End: 2025-01-30
Payer: COMMERCIAL

## 2025-02-03 NOTE — PROGRESS NOTES
Consent:  Verbal consent was requested and obtained from patient on this date for a telehealth visit.    Patient ID: Xochitl Pichardo is a 59 y.o. female.  Referring Physician: No referring provider defined for this encounter.  Primary Care Provider: No Assigned PCP Generic Provider, MD Zarate    Xochitl is a 59y  postmenopausal female here as a referral for a pelvic mass in the setting of a history of colon cancer. Cyst was first noted om , 3cm in size. This was also when her colonic mass was first noted. When she had surgery in , the cyst was noted to be 5cm. On a surveillance CT last month, cyst was noted to be 9.8 x 11.5 x 12.5 cm. CA-125 normal.     Today she reports that she feels a pressure sensation when she has bowel movements but otherwise is asymptomatic from this mass. She has intermittent abdominal pain that does not require any pain medications. Denies any PMB, no abnormal discharge. Good appetite, no unintentional weight loss. No urinary complaints. No fatigue, fever/chills/CP.       OBHx: SVDx3, PP 9#8  GynHx:   Menarche: 13  Menopause: age 53, had regular menses prior to that, no PMB, denies any abnormal pap smear, never any contraception or anything to regulate periods   MedHx: colon cancer  Shx: sigmoid colectomy     Cyst history:   TVUS 24: Uterus 5cm x 3.8cm x 7.3cm. Retroflexed uterus. Echogenic nodule in the anterior myometrium similar the prior exam measuring 10 x 5 x 8 mm. This may represent fibroid. EL 6mm, fluid in canal. Slightly complex cystic structure in the right adnexa likely arising from the right ovary. This is approximately 12.5cm x 10.9cm x 14.6cm and demonstrates normal flow. This is larger than the prior exam. The left ovary is not seen.    CTAP 12/3/24: Dome of the bladder is indented by a midline cystic mass which measures 9.8 x 11.5 x 12.5 cm. The mass is homogeneous in density with no enhancing or solid component  noted. Right ovary is identified and  appears to be separate from the mass, but a separate left ovary is not definitely identified in the mass could arise from the left ovary. Uterus is retroflexed in the cystic mass pushes on the uterus and rectosigmoid colon in addition to the bladder.    TVUS 3/8/24: The uterus measures 6.4 x 3.4 x 4.6 cm in diameter. The double wall  endometrial thickness is 2 mm. There is trace fluid in the endocervical canal. There is an ovoid 7 x 4 mm hyperechoic mass at the lower uterine segment adjacent to the endometrium. The right ovary measures 5.5 x 4.8 x 5.7 cm in diameter.. There is a 4.7 x 5.4 x 5.5 cm cyst in the right ovary with some internal debris. There is a thin uniform wall. Doppler interrogation of the right ovary reveals normal arterial activity. The left ovary is not visualized.    CTAP 10/13/23: Interval increase in size of 5.5 cm x 3.4 center cystic lesion in the left posterior pelvis; follow-up nonemergent pelvic ultrasound is recommended for further evaluation.    CTAP 12/2022: Likely a left ovarian cyst measuring approximately 3 cm in diameter.   EMB non diagnostic    Feeling well    No bleeding          Objective    BSA: There is no height or weight on file to calculate BSA.  There were no vitals taken for this visit.     Physical Exam    Performance Status:  Symptomatic; fully ambulatory    Assessment/Plan    Oncology History    No history exists.     Cancer Staging   No matching staging information was found for the patient.       Problem List Items Addressed This Visit             ICD-10-CM    Colon cancer (Multi) - Primary C18.9    Ovarian retention cyst N83.209        Treatment Plans       No treatment plans exist           reviewed non diagnostic EMB in setting of EMS 6mm.  We discussed option of hysterectomy at time of BSO if ovarian pathology is benign.  Patient strongly wishes to avoid hysterectomy at this time.    We came to a shared decision to proceed with Laparoscopic BSO, poss staging.   Consent reviewed, risks discussed.    Follow up for PAT and as scheduled for surgery.    I spent 15 min telephone discussion with the patient.

## 2025-02-04 ENCOUNTER — TELEMEDICINE (OUTPATIENT)
Dept: GYNECOLOGIC ONCOLOGY | Facility: HOSPITAL | Age: 60
End: 2025-02-04
Payer: COMMERCIAL

## 2025-02-04 DIAGNOSIS — C18.9 MALIGNANT NEOPLASM OF COLON, UNSPECIFIED PART OF COLON (MULTI): Primary | ICD-10-CM

## 2025-02-04 DIAGNOSIS — N83.209 CYST OF OVARY, UNSPECIFIED LATERALITY: ICD-10-CM

## 2025-02-04 PROCEDURE — 99212 OFFICE O/P EST SF 10 MIN: CPT | Performed by: OBSTETRICS & GYNECOLOGY

## 2025-02-05 ENCOUNTER — PRE-ADMISSION TESTING (OUTPATIENT)
Dept: PREADMISSION TESTING | Facility: HOSPITAL | Age: 60
End: 2025-02-05
Payer: COMMERCIAL

## 2025-02-05 VITALS
OXYGEN SATURATION: 97 % | BODY MASS INDEX: 30.78 KG/M2 | HEIGHT: 66 IN | HEART RATE: 105 BPM | DIASTOLIC BLOOD PRESSURE: 89 MMHG | TEMPERATURE: 97.9 F | WEIGHT: 191.5 LBS | SYSTOLIC BLOOD PRESSURE: 136 MMHG

## 2025-02-05 DIAGNOSIS — C18.9 MALIGNANT NEOPLASM OF COLON, UNSPECIFIED PART OF COLON (MULTI): Primary | ICD-10-CM

## 2025-02-05 LAB
ABO GROUP (TYPE) IN BLOOD: NORMAL
ANION GAP SERPL CALC-SCNC: 16 MMOL/L (ref 10–20)
ANTIBODY SCREEN: NORMAL
BUN SERPL-MCNC: 13 MG/DL (ref 6–23)
CALCIUM SERPL-MCNC: 10.7 MG/DL (ref 8.6–10.6)
CHLORIDE SERPL-SCNC: 104 MMOL/L (ref 98–107)
CO2 SERPL-SCNC: 25 MMOL/L (ref 21–32)
CREAT SERPL-MCNC: 0.85 MG/DL (ref 0.5–1.05)
EGFRCR SERPLBLD CKD-EPI 2021: 79 ML/MIN/1.73M*2
ERYTHROCYTE [DISTWIDTH] IN BLOOD BY AUTOMATED COUNT: 12.5 % (ref 11.5–14.5)
GLUCOSE SERPL-MCNC: 94 MG/DL (ref 74–99)
HCT VFR BLD AUTO: 45.1 % (ref 36–46)
HGB BLD-MCNC: 15.3 G/DL (ref 12–16)
MCH RBC QN AUTO: 30.4 PG (ref 26–34)
MCHC RBC AUTO-ENTMCNC: 33.9 G/DL (ref 32–36)
MCV RBC AUTO: 90 FL (ref 80–100)
NRBC BLD-RTO: 0 /100 WBCS (ref 0–0)
PLATELET # BLD AUTO: 302 X10*3/UL (ref 150–450)
POTASSIUM SERPL-SCNC: 3.9 MMOL/L (ref 3.5–5.3)
RBC # BLD AUTO: 5.03 X10*6/UL (ref 4–5.2)
RH FACTOR (ANTIGEN D): NORMAL
SODIUM SERPL-SCNC: 141 MMOL/L (ref 136–145)
WBC # BLD AUTO: 10 X10*3/UL (ref 4.4–11.3)

## 2025-02-05 PROCEDURE — 99204 OFFICE O/P NEW MOD 45 MIN: CPT | Performed by: NURSE PRACTITIONER

## 2025-02-05 PROCEDURE — 85027 COMPLETE CBC AUTOMATED: CPT

## 2025-02-05 PROCEDURE — 80048 BASIC METABOLIC PNL TOTAL CA: CPT

## 2025-02-05 PROCEDURE — 86901 BLOOD TYPING SEROLOGIC RH(D): CPT

## 2025-02-05 PROCEDURE — 36415 COLL VENOUS BLD VENIPUNCTURE: CPT

## 2025-02-05 ASSESSMENT — ENCOUNTER SYMPTOMS
MUSCULOSKELETAL NEGATIVE: 1
EYES NEGATIVE: 1
NECK NEGATIVE: 1
ENDOCRINE NEGATIVE: 1
ABDOMINAL PAIN: 1
RESPIRATORY NEGATIVE: 1
NEUROLOGICAL NEGATIVE: 1
CARDIOVASCULAR NEGATIVE: 1
CONSTITUTIONAL NEGATIVE: 1

## 2025-02-05 ASSESSMENT — DUKE ACTIVITY SCORE INDEX (DASI)
CAN YOU WALK INDOORS, SUCH AS AROUND YOUR HOUSE: YES
TOTAL_SCORE: 58.2
CAN YOU CLIMB A FLIGHT OF STAIRS OR WALK UP A HILL: YES
DASI METS SCORE: 9.9
CAN YOU DO YARD WORK LIKE RAKING LEAVES, WEEDING OR PUSHING A MOWER: YES
CAN YOU WALK A BLOCK OR TWO ON LEVEL GROUND: YES
CAN YOU RUN A SHORT DISTANCE: YES
CAN YOU DO MODERATE WORK AROUND THE HOUSE LIKE VACUUMING, SWEEPING FLOORS OR CARRYING GROCERIES: YES
CAN YOU DO LIGHT WORK AROUND THE HOUSE LIKE DUSTING OR WASHING DISHES: YES
CAN YOU HAVE SEXUAL RELATIONS: YES
CAN YOU PARTICIPATE IN MODERATE RECREATIONAL ACTIVITIES LIKE GOLF, BOWLING, DANCING, DOUBLES TENNIS OR THROWING A BASEBALL OR FOOTBALL: YES
CAN YOU TAKE CARE OF YOURSELF (EAT, DRESS, BATHE, OR USE TOILET): YES
CAN YOU PARTICIPATE IN STRENOUS SPORTS LIKE SWIMMING, SINGLES TENNIS, FOOTBALL, BASKETBALL, OR SKIING: YES
CAN YOU DO HEAVY WORK AROUND THE HOUSE LIKE SCRUBBING FLOORS OR LIFTING AND MOVING HEAVY FURNITURE: YES

## 2025-02-05 ASSESSMENT — LIFESTYLE VARIABLES: SMOKING_STATUS: NONSMOKER

## 2025-02-05 NOTE — PREPROCEDURE INSTRUCTIONS
Fasting Guidelines    NPO Instructions:    Do not eat any food after midnight the night before your surgery/procedure.  You may have up to 13.5 ounces of clear liquids until TWO hours before your instructed arrival time to the hospital. This includes water, black tea/coffee, (no milk or cream), apple juice, and/or electrolyte drinks (Gatorade).  You may chew gum up to TWO hours before your surgery/procedure.    Additional Instructions:    Avoid herbal supplements, multivitamins and NSAIDS (non-steroidal anti-inflammatory drugs) such as Advil, Aleve, Ibuprofen, Naproxen, Excedrin, Meloxicam or Celebrex for at least 7 days prior to surgery. May take Tylenol as needed.    Avoid tobacco and alcohol products for 24 hours prior to surgery.    CONTACT SURGEON'S OFFICE IF YOU DEVELOP:  * Fever = 100.4 F   * New respiratory symptoms (e.g. cough, shortness of breath, respiratory distress, sore throat)  * Recent loss of taste or smell  *Flu like symptoms such as headache, fatigue or gastrointestinal symptoms  * You develop any open sores, shingles, burning or painful urination   AND/OR:  * You no longer wish to have the surgery.  * Any other personal circumstances change that may lead to the need to cancel or defer this surgery.  *You were admitted to any hospital within one week of your planned procedure.    Seven/Six Days before Surgery:  Review your medication instructions, stop indicated medications    Day of Surgery:  Review your medication instructions, take indicated medications  Wear comfortable loose fitting clothing  Do not use moisturizers, creams, lotions or perfume  All jewelry and valuables should be left at home    [facility]  [facility]  [phone]  [phone]  [email or web address]      Preoperative Brain Exercises    What are brain exercises?  A brain exercise is any activity that engages your thinking (cognitive) skills.    What types of  activities are considered brain exercises?  Jigsaw puzzles, crossword puzzles, word jumble, memory games, word search, and many more.  Many can be found free online or on your phone via a mobile juan.    Why should I do brain exercises before my surgery?  More recent research has shown brain exercise before surgery can lower the risk of postoperative delirium (confusion) which can be especially important for older adults.  Patients who did brain exercises for 5 to 10 hours the days before surgery, cut their risk of postoperative delirium in half up to 1 week after surgery.         [facility]    Preoperative Deep Breathing Exercises    Why it is important to do deep breathing exercises before my surgery?  Deep breathing exercises strengthen your breathing muscles.  This helps you to recover after your surgery and decreases the chance of breathing complications.      How are the deep breathing exercises done?  Sit straight with your back supported.  Breathe in deeply and slowly through your nose. Your lower rib cage should expand and your abdomen may move forward.  Hold that breath for 3 to 5 seconds.  Breathe out through pursed lips, slowly and completely.  Rest and repeat 10 times every hour while awake.  Rest longer if you become dizzy or lightheaded.         Patient and Family Education             Ways You Can Help Prevent Blood Clots             This handout explains some simple things you can do to help prevent blood clots.      Blood clots are blockages that can form in the body's veins. When a blood clot forms in your deep veins, it may be called a deep vein thrombosis, or DVT for short. Blood clots can happen in any part of the body where blood flows, but they are most common in the arms and legs. If a piece of a blood clot breaks free and travels to the lungs, it is called a pulmonary embolus (PE). A PE can be a very serious problem.         Being in the hospital or having surgery  can raise your chances of getting a blood clot because you may not be well enough to move around as much as you normally do.         Ways you can help prevent blood clots in the hospital         Wearing SCDs. SCDs stands for Sequential Compression Devices.   SCDs are special sleeves that wrap around your legs  They attach to a pump that fills them with air to gently squeeze your legs every few minutes.   This helps return the blood in your legs to your heart.   SCDs should only be taken off when walking or bathing.   SCDs may not be comfortable, but they can help save your life.               Wearing compression stockings - if your doctor orders them. These special snug fitting stockings gently squeeze your legs to help blood flow.       Walking. Walking helps move the blood in your legs.   If your doctor says it is ok, try walking the halls at least   5 times a day. Ask us to help you get up, so you don't fall.      Taking any blood thinning medicines your doctor orders.        Page 1 of 2     [facility]; 3/23   Ways you can help prevent blood clots at home       Wearing compression stockings - if your doctor orders them. ? Walking - to help move the blood in your legs.       Taking any blood thinning medicines your doctor orders.      Signs of a blood clot or PE      Tell your doctor or nurse know right away if you have of the problems listed below.    If you are at home, seek medical care right away. Call 911 for chest pain or problems breathing.               Signs of a blood clot (DVT) - such as pain,  swelling, redness or warmth in your arm or leg      Signs of a pulmonary embolism (PE) - such as chest     pain or feeling short of breath

## 2025-02-05 NOTE — CPM/PAT H&P
CPM/PAT Evaluation       Name: Xochitl Pichardo (Xochitl Pichardo)  /Age: 1965/59 y.o.     Visit Type:   In-Person       Chief Complaint: perioperative evaluation      HPI  The patient is a 59 year old  female with pelvic mass in the setting of a history of colon cancer. Cyst was first noted om , 3cm in size. When she had surgery for colonic mass in , the cyst was noted to be 5cm. On a surveillance CT last month, cyst was noted to be 9.8 x 11.5 x 12.5 cm. CA-125 normal. She notes some intermittent abdominal pain but otherwise denies fever, chills, n/v, chest pain, sob or changes in bowel or bladder pattern. She presents today for perioperative evaluation in anticipation of SALPINGO-OOPHORECTOMY, LAPAROSCOPIC , Possible hysterectomy, possible staging on 25 with Dr. Crouch.    Past Medical History:   Diagnosis Date    Colon cancer (Multi)     Colorectal cancer (Multi)     Dental disease     chipped    Hypothyroidism     Murmur     Other specified disorders of the skin and subcutaneous tissue 2019    Morgellons syndrome    Other specified disorders of the skin and subcutaneous tissue 2019    Morgellons syndrome    Pelvic mass     Vision loss        Past Surgical History:   Procedure Laterality Date    COLONOSCOPY      OTHER SURGICAL HISTORY  10/14/2023    loop ostomy       Patient  reports being sexually active.    Family History   Problem Relation Name Age of Onset    Parkinsonism Mother      Miranda Parkinson White syndrome Mother      Heart disease Father      Heart disease Brother      Heart disease Paternal Grandfather         Allergies   Allergen Reactions    Golytely [Peg 3350-Electrolytes] Itching     Tightness in chest, Redness of extremities, nausea/vomiting       Prior to Admission medications    Medication Sig Start Date End Date Taking? Authorizing Provider   magnesium chloride (MagDelay) 64 mg EC tablet Take 1 tablet (64 mg) by mouth.    Historical Provider, MD     "    PAT ROS:   Constitutional:   neg    Neuro/Psych:   neg    Eyes:   neg     use of corrective lenses  Ears:   neg    Nose:   neg    Mouth:   neg    Throat:   neg    Neck:   neg    Cardio:   neg    Respiratory:   neg    Endocrine:   neg    GI:    abdominal pain (intermittent)  :   neg    Musculoskeletal:   neg    Hematologic:   neg    Skin:  neg        Physical Exam  Vitals reviewed.   Constitutional:       Appearance: Normal appearance.   HENT:      Head: Normocephalic and atraumatic.      Nose: Nose normal.      Mouth/Throat:      Mouth: Mucous membranes are moist.      Pharynx: Oropharynx is clear.   Eyes:      Extraocular Movements: Extraocular movements intact.      Conjunctiva/sclera: Conjunctivae normal.      Pupils: Pupils are equal, round, and reactive to light.   Cardiovascular:      Rate and Rhythm: Normal rate and regular rhythm.      Pulses: Normal pulses.      Heart sounds: Normal heart sounds.   Pulmonary:      Effort: Pulmonary effort is normal.      Breath sounds: Normal breath sounds.   Musculoskeletal:         General: Normal range of motion.      Cervical back: Normal range of motion.   Skin:     General: Skin is warm and dry.   Neurological:      General: No focal deficit present.      Mental Status: She is alert and oriented to person, place, and time.   Psychiatric:         Mood and Affect: Mood normal.         Behavior: Behavior normal.          PAT AIRWAY:   Airway:     Mallampati::  II    TM distance::  >3 FB    Neck ROM::  Full  normal          Visit Vitals  /89   Pulse 105   Temp 36.6 °C (97.9 °F)   Ht 1.676 m (5' 6\")   Wt 86.9 kg (191 lb 8 oz)   SpO2 97%   BMI 30.91 kg/m²   OB Status Postmenopausal   Smoking Status Never   BSA 2.01 m²       DASI Risk Score      Flowsheet Row Pre-Admission Testing from 2/5/2025 in [facility] Pre-Admission Testing from 12/5/2023 in  [facility]   Can you take care of yourself (eat, dress, bathe, or use toilet)?  2.75 " filed at 02/05/2025 1515 2.75 filed at 12/05/2023 1431   Can you walk indoors, such as around your house? 1.75 filed at 02/05/2025 1515 1.75 filed at 12/05/2023 1431   Can you walk a block or two on level ground?  2.75 filed at 02/05/2025 1515 2.75 filed at 12/05/2023 1431   Can you climb a flight of stairs or walk up a hill? 5.5 filed at 02/05/2025 1515 5.5 filed at 12/05/2023 1431   Can you run a short distance? 8 filed at 02/05/2025 1515 0 filed at 12/05/2023 1431   Can you do light work around the house like dusting or washing dishes? 2.7 filed at 02/05/2025 1515 2.7 filed at 12/05/2023 1431   Can you do moderate work around the house like vacuuming, sweeping floors or carrying groceries? 3.5 filed at 02/05/2025 1515 3.5 filed at 12/05/2023 1431   Can you do heavy work around the house like scrubbing floors or lifting and moving heavy furniture?  8 filed at 02/05/2025 1515 8 filed at 12/05/2023 1431   Can you do yard work like raking leaves, weeding or pushing a mower? 4.5 filed at 02/05/2025 1515 4.5 filed at 12/05/2023 1431   Can you have sexual relations? 5.25 filed at 02/05/2025 1515 --   Can you participate in moderate recreational activities like golf, bowling, dancing, doubles tennis or throwing a baseball or football? 6 filed at 02/05/2025 1515 0 filed at 12/05/2023 1431   Can you participate in strenous sports like swimming, singles tennis, football, basketball, or skiing? 7.5 filed at 02/05/2025 1515 0 filed at 12/05/2023 1431   DASI SCORE 58.2 filed at 02/05/2025 1515 --   METS Score (Will be calculated only when all the questions are answered) 9.9 filed at 02/05/2025 1515 --          Caprini DVT Assessment      Flowsheet Row Pre-Admission Testing from 2/5/2025 in [facility] Admission (Discharged) from 4/24/2024 in [facility] with Estrellita Nguyen MD   DVT Score (IF A SCORE IS NOT CALCULATING, MUST SELECT A BMI TO COMPLETE) 6 filed at 02/05/2025 5399 3 filed at  04/24/2024 1152   Medical Factors Present cancer, chemotherapy, or previous malignancy filed at 02/05/2025 1525 --   Surgical Factors Major surgery planned, including arthroscopic and laproscopic (1-2 hours) filed at 02/05/2025 1525 --   BMI (BMI MUST BE CHOSEN) 30 or less filed at 02/05/2025 1525 30 or less filed at 04/24/2024 1152   RETIRED: Current Status -- Minor surgery planned filed at 04/24/2024 1152   RETIRED: Age -- 40-59 years filed at 04/24/2024 1152          Modified Frailty Index      Flowsheet Row Pre-Admission Testing from 2/5/2025 in [facility]   Non-independent functional status (problems with dressing, bathing, personal grooming, or cooking) 0 filed at 02/05/2025 1517   History of diabetes mellitus  0 filed at 02/05/2025 1517   History of COPD 0 filed at 02/05/2025 1517   History of CHF No filed at 02/05/2025 1517   History of MI 0 filed at 02/05/2025 1517   History of Percutaneous Coronary Intervention, Cardiac Surgery, or Angina No filed at 02/05/2025 1517   Hypertension requiring the use of medication  0 filed at 02/05/2025 1517   Peripheral vascular disease 0 filed at 02/05/2025 1517   Impaired sensorium (cognitive impairement or loss, clouding, or delirium) 0 filed at 02/05/2025 1517   TIA or CVA withouy residual deficit 0 filed at 02/05/2025 1517   Cerebrovascular accident with deficit 0 filed at 02/05/2025 1517   Modified Frailty Index Calculator 0 filed at 02/05/2025 1517          CHADS2 Stroke Risk  Current as of yesterday        N/A 3 to 100%: High Risk   2 to < 3%: Medium Risk   0 to < 2%: Low Risk     Last Change: N/A          This score determines the patient's risk of having a stroke if the patient has atrial fibrillation.        This score is not applicable to this patient. Components are not calculated.          Revised Cardiac Risk Index      Flowsheet Row Pre-Admission Testing from 2/5/2025 in [facility] Pre-Admission Testing from 3/21/2024 in    [facility]   High-Risk Surgery (Intraperitoneal, Intrathoracic,Suprainguinal vascular) 1 filed at 02/05/2025 1517 1 filed at 03/21/2024 1356   History of ischemic heart disease (History of MI, History of positive exercuse test, Current chest paint considered due to myocardial ischemia, Use of nitrate therapy, ECG with pathological Q Waves) 0 filed at 02/05/2025 1517 0 filed at 03/21/2024 1356   History of congestive heart failure (pulmonary edemia, bilateral rales or S3 gallop, Paroxysmal nocturnal dyspnea, CXR showing pulmonary vascular redistribution) 0 filed at 02/05/2025 1517 0 filed at 03/21/2024 1356   History of cerebrovascular disease (Prior TIA or stroke) 0 filed at 02/05/2025 1517 0 filed at 03/21/2024 1356   Pre-operative insulin treatment 0 filed at 02/05/2025 1517 0 filed at 03/21/2024 1356   Pre-operative creatinine>2 mg/dl 0 filed at 02/05/2025 1517 0 filed at 03/21/2024 1356   Revised Cardiac Risk Calculator 1 filed at 02/05/2025 1517 1 filed at 03/21/2024 1356          Apfel Simplified Score      Flowsheet Row Pre-Admission Testing from 2/5/2025 in [facility] Pre-Admission Testing from 3/21/2024 in  [facility]   Smoking status 1 filed at 02/05/2025 1517 1 filed at 03/21/2024 1356   History of motion sickness or PONV  0 filed at 02/05/2025 1517 0 filed at 03/21/2024 1356   Use of postoperative opioids 0 filed at 02/05/2025 1517 0 filed at 03/21/2024 1356   Gender - Female 1=Yes filed at 02/05/2025 1517 --   Apfel Simplified Score Calculator 2 filed at 02/05/2025 1517 2 filed at 03/21/2024 1356          Risk Analysis Index Results This Encounter    No data found in the last 10 encounters.       Stop Bang Score      Flowsheet Row Pre-Admission Testing from 2/5/2025 in [facility] Admission (Discharged) from 4/24/2024 in [place] [address] [address] with Estrellita Nguyen MD   Do you snore loudly? 0 filed at 02/05/2025 1514 1 filed at  04/24/2024 1058   Do you often feel tired or fatigued after your sleep? 1 filed at 02/05/2025 1514 0 filed at 04/24/2024 1058   Has anyone ever observed you stop breathing in your sleep? 0 filed at 02/05/2025 1514 0 filed at 04/24/2024 1058   Do you have or are you being treated for high blood pressure? 0 filed at 02/05/2025 1514 0 filed at 04/24/2024 1058   Recent BMI (Calculated) 29.9 filed at 02/05/2025 1514 26.8 filed at 04/24/2024 1058   Is BMI greater than 35 kg/m2? 0=No filed at 02/05/2025 1514 0=No filed at 04/24/2024 1058   Age older than 50 years old? 1=Yes filed at 02/05/2025 1514 1=Yes filed at 04/24/2024 1058   Is your neck circumference greater than 17 inches (Male) or 16 inches (Female)? 0 filed at 02/05/2025 1514 0 filed at 04/24/2024 1058   Gender - Male 0=No filed at 02/05/2025 1514 0=No filed at 04/24/2024 1058   STOP-BANG Total Score 2 filed at 02/05/2025 1514 2 filed at 04/24/2024 1058          Prodigy: High Risk  Total Score: 0          ARISCAT Score for Postoperative Pulmonary Complications      Flowsheet Row Pre-Admission Testing from 2/5/2025 in [facility]   Age Calculated Score 3 filed at 02/05/2025 1552   Preoperative SpO2 0 filed at 02/05/2025 1552   Respiratory infection in the last month Either upper or lower (i.e., URI, bronchitis, pneumonia), with fever and antibiotic treatment 0 filed at 02/05/2025 1552   Preoperative anemia (Hgb less than 10 g/dl) 0 filed at 02/05/2025 1552   Surgical incision  0 filed at 02/05/2025 1552   Duration of surgery  0 filed at 02/05/2025 1552   Emergency Procedure  0 filed at 02/05/2025 1552   ARISCAT Total Score  3 filed at 02/05/2025 1552          Kimber Perioperative Risk for Myocardial Infarction or Cardiac Arrest (NEIL)    No data to display       Recent Results (from the past week)   CBC    Collection Time: 02/05/25  3:40 PM   Result Value Ref Range    WBC 10.0 4.4 - 11.3 x10*3/uL    nRBC 0.0 0.0 - 0.0 /100 WBCs    RBC 5.03 4.00 -  5.20 x10*6/uL    Hemoglobin 15.3 12.0 - 16.0 g/dL    Hematocrit 45.1 36.0 - 46.0 %    MCV 90 80 - 100 fL    MCH 30.4 26.0 - 34.0 pg    MCHC 33.9 32.0 - 36.0 g/dL    RDW 12.5 11.5 - 14.5 %    Platelets 302 150 - 450 x10*3/uL   Basic Metabolic Panel    Collection Time: 02/05/25  3:40 PM   Result Value Ref Range    Glucose 94 74 - 99 mg/dL    Sodium 141 136 - 145 mmol/L    Potassium 3.9 3.5 - 5.3 mmol/L    Chloride 104 98 - 107 mmol/L    Bicarbonate 25 21 - 32 mmol/L    Anion Gap 16 10 - 20 mmol/L    Urea Nitrogen 13 6 - 23 mg/dL    Creatinine 0.85 0.50 - 1.05 mg/dL    eGFR 79 >60 mL/min/1.73m*2    Calcium 10.7 (H) 8.6 - 10.6 mg/dL   Type And Screen Is this order related to pregnancy or an upcoming surgery? Yes; Where will this surgery/delivery be performed? [facility]; What is the date of the surgery? 2/14/2025; Has this patient ever had a transfusion? No; Has t...    Collection Time: 02/05/25  3:40 PM   Result Value Ref Range    ABO TYPE B     Rh TYPE POS     ANTIBODY SCREEN NEG         Diagnostic Results    EKG 12/5/23  Sinus rhythm  Low voltage, precordial leads  Baseline wander in lead(s) V4    TVUS 12/16/24: Uterus 5cm x 3.8cm x 7.3cm. Retroflexed uterus. Echogenic nodule in the anterior myometrium similar the prior exam measuring 10 x 5 x 8 mm. This may represent fibroid. EL 6mm, fluid in canal. Slightly complex cystic structure in the right adnexa likely arising from the right ovary. This is approximately 12.5cm x 10.9cm x 14.6cm and demonstrates normal flow. This is larger than the prior exam. The left ovary is not seen.     CTAP 12/3/24: Dome of the bladder is indented by a midline cystic mass which measures 9.8 x 11.5 x 12.5 cm. The mass is homogeneous in density with no enhancing or solid component  noted. Right ovary is identified and appears to be separate from the mass, but a separate left ovary is not definitely identified in the mass could arise from the left ovary. Uterus is retroflexed  in the cystic mass pushes on the uterus and rectosigmoid colon in addition to the bladder.     TVUS 3/8/24: The uterus measures 6.4 x 3.4 x 4.6 cm in diameter. The double wall  endometrial thickness is 2 mm. There is trace fluid in the endocervical canal. There is an ovoid 7 x 4 mm hyperechoic mass at the lower uterine segment adjacent to the endometrium. The right ovary measures 5.5 x 4.8 x 5.7 cm in diameter.. There is a 4.7 x 5.4 x 5.5 cm cyst in the right ovary with some internal debris. There is a thin uniform wall. Doppler interrogation of the right ovary reveals normal arterial activity. The left ovary is not visualized.     CTAP 10/13/23: Interval increase in size of 5.5 cm x 3.4 center cystic lesion in the left posterior pelvis; follow-up nonemergent pelvic ultrasound is recommended for further evaluation.     CTAP 12/2022: Likely a left ovarian cyst measuring approximately 3 cm in diameter.   EMB non diagnostic       Assessment and Plan:     Anesthesia  The patient denies problems with anesthesia in the past such as PONV, prolonged sedation, awareness, dental damage, aspiration, cardiac arrest, difficult intubation, or unexpected hospital admissions.     Neurology  The patient has no neurological diagnoses or significant findings on chart review, clinical presentation, and evaluation. No grossly apparent neurological perioperative risk. The patient is at increased risk for perioperative stroke secondary to female gender, general anesthesia. Handouts for preoperative brain exercises given to patient.    HEENT/Airway  No diagnoses, significant findings on chart review, clinical presentation, or evaluation. No documented or reported history of airway difficulty.     Cardiovascular  No diagnoses or significant findings on chart review or clinical presentation and evaluation.    METS  The patient's functional capacity is greater than 4 METS.  RCRI  The patient meets 1 RCRI criteria and therefore has a 6% risk  of major adverse cardiac complications.  NEIL score which indicates a 0.1% risk of intraoperative or 30-day postoperative MACE (major adverse cardiac event).    Cardiology Evaluation  The patient is not followed by cardiology.    She is scheduled for non-cardiac surgery associated with elevated risk. The patient has no major cardiac contraindications to non- cardiac surgery.    Pulmonary  No significant findings on chart review or clinical presentation and evaluation.    STOP BANG 2, which places patient at low risk for having EDDIE.  ARISCAT 3, low, 1.6% risk of in-hospital postoperative pulmonary complications  PRODIGY 0, low risk of respiratory depression episode.     Patient given PI sheet for preoperative deep breathing exercises.  Encourage  incentive spirometry in the postoperative period as deemed necessary.    Endocrine  No diagnoses or significant findings on chart review or clinical presentation and evaluation.    Gastrointestinal  The patient has history of colon cancer, Stage IIa, pT3 pN0 M0.  She is s/p sigmoid colectomy.    She follows with Dr. Sherley Vernon, no adjuvant treatment recommended, LOV 7/30/24 1/25/24 , PET shows postsurgical changes status post sigmoid colectomy without evidence of FDG avid local recurrence or metastatic disease.     Eat 10- 0,  self-perceived oropharyngeal dysphagia scale (0-40)     Genitourinary  The patient has pelvic mass increase din size since 2023. Scheduled for surgery with Dr. Crouch on 2/14/25.    Renal  No renal diagnoses or significant findings on chart review or clinical presentation and evaluation. The patient is scheduled for peritoneal surgery which places patient at higher risk of perioperative renal complications. The patient has specific risk factors associated with increased risk of perioperative renal complications related to age greater than 55. Preventative measures include preoperative hydration.    Hematology  No diagnoses or significant  findings on chart review or clinical presentation and evaluation.    Caprini score 6, high risk of perioperative VTE.     Patient instructed to ambulate as soon as possible postoperatively to decrease thromboembolic risk. Initiate mechanical DVT prophylaxis as soon as possible and initiate chemical prophylaxis when deemed safe from a bleeding standpoint post surgery.     Transfusion Evaluation  A type and screen was obtained given the likelihood for perioperative transfusion of blood or blood products.    Musculoskeletal  No diagnoses or significant findings on chart review or clinical presentation and evaluation.    ID  No diagnoses or significant findings on chart review or clinical presentation and evaluation.    -Preoperative medication instructions were provided and reviewed with the patient.  Any additional testing or evaluation was explained to the patient.  NPO Instructions were discussed, and the patient's questions were answered prior to conclusion of this encounter. Patient verbalized understanding of preoperative instructions. After Visit Summary given.

## 2025-02-10 NOTE — HOSPITAL COURSE
[ ] PAT  [ ] Plan for overnight obs?  [ ] Consent NEEDS  [x] Preop meds  [x] Add to list    Gynecologic Oncology Surgery H&P  Xochitl Pichardo is a 59 y.o. female with history of colon cancer and right adnexal mass presenting for laparoscopic salpingo-oophorectomy, possible hysterectomy, and possible staging.    Preop labs (25): Hgb 15.3, Plt 302, Cr 0.85    PAT (25): cleared    Tumor History:  Imaging/pathology    TVUS 24: Uterus 5cm x 3.8cm x 7.3cm. Retroflexed uterus. Echogenic nodule in the anterior myometrium similar the prior exam measuring 10 x 5 x 8 mm. This may represent fibroid. EL 6mm, fluid in canal. Slightly complex cystic structure in the right adnexa likely arising from the right ovary. This is approximately 12.5cm x 10.9cm x 14.6cm and demonstrates normal flow. This is larger than the prior exam. The left ovary is not seen.     Past Medical History:  Past Medical History:   Diagnosis Date    Colon cancer (Multi)     Colorectal cancer (Multi)     Dental disease     chipped    Hypothyroidism     Murmur     Other specified disorders of the skin and subcutaneous tissue 2019    Morgellons syndrome    Other specified disorders of the skin and subcutaneous tissue 2019    Morgellons syndrome    Pelvic mass     Vision loss         Surgical History:  Past Surgical History:   Procedure Laterality Date    COLONOSCOPY      OTHER SURGICAL HISTORY  10/14/2023    loop ostomy        Ob/Gyn History:  OB History    Para Term  AB Living   3             SAB IAB Ectopic Multiple Live Births           3      # Outcome Date GA Lbr Gael/2nd Weight Sex Type Anes PTL Lv   3             2             1              x3  Menopause: age 53, had regular menses prior to that, no PMB, denies any abnormal pap smear, never any contraception or anything to regulate periods      Social History:  Social History     Socioeconomic History    Marital status:    Tobacco Use     Smoking status: Never    Smokeless tobacco: Never   Vaping Use    Vaping status: Never Used   Substance and Sexual Activity    Alcohol use: Not Currently    Drug use: Never    Sexual activity: Yes     Social Drivers of Health     Financial Resource Strain: Low Risk  (4/24/2024)    Overall Financial Resource Strain (CARDIA)     Difficulty of Paying Living Expenses: Not very hard   Transportation Needs: No Transportation Needs (4/24/2024)    PRAPARE - Transportation     Lack of Transportation (Medical): No     Lack of Transportation (Non-Medical): No   Housing Stability: Low Risk  (4/24/2024)    Housing Stability Vital Sign     Unable to Pay for Housing in the Last Year: No     Number of Places Lived in the Last Year: 1     Unstable Housing in the Last Year: No        Family History:  Family History   Problem Relation Name Age of Onset    Parkinsonism Mother      Miranda Parkinson White syndrome Mother      Heart disease Father      Heart disease Brother      Heart disease Paternal Grandfather          Medications:  Current Outpatient Medications   Medication Instructions    magnesium chloride (MAGDELAY) 64 mg, oral       Allergies:  Golytely [peg 3350-electrolytes]    Objective    Last Vitals  There were no vitals taken for this visit.    Physical Examination  General: no acute distress  HEENT: normocephalic, atraumatic  Heart: warm and well perfused  Lungs: breathing comfortably on room air  Abdomen: to be performed in OR  Extremities: moving all extremities  Neuro: awake and conversant  Psych: appropriate mood and affect    Lab Review  Results from last 7 days   Lab Units 02/05/25  1540   HEMOGLOBIN g/dL 15.3   HEMATOCRIT % 45.1   PLATELETS AUTO x10*3/uL 302   CREATININE mg/dL 0.85         Lab Results   Component Value Date    WBC 10.0 02/05/2025    HGB 15.3 02/05/2025    HCT 45.1 02/05/2025    MCV 90 02/05/2025     02/05/2025       Lab Results   Component Value Date    GLUCOSE 94 02/05/2025    CALCIUM 10.7  (H) 02/05/2025     02/05/2025    K 3.9 02/05/2025    CO2 25 02/05/2025     02/05/2025    BUN 13 02/05/2025    CREATININE 0.85 02/05/2025       Assessment/Plan     Xochitl Pichardo is a 59 y.o. with history of colon cancer and right adnexal mass presenting for scheduled surgery.    Plan to proceed with laparoscopic salpingo-oophorectomy, possible hysterectomy, and possible staging.   Surgical consent was reviewed.     To be d/w Dr. Crouch    Gyn Onc Pager [phone]

## 2025-02-13 ENCOUNTER — ANESTHESIA EVENT (OUTPATIENT)
Dept: OPERATING ROOM | Facility: HOSPITAL | Age: 60
End: 2025-02-13
Payer: COMMERCIAL

## 2025-02-14 ENCOUNTER — HOSPITAL ENCOUNTER (OUTPATIENT)
Facility: HOSPITAL | Age: 60
Setting detail: OUTPATIENT SURGERY
Discharge: HOME | End: 2025-02-14
Attending: OBSTETRICS & GYNECOLOGY | Admitting: OBSTETRICS & GYNECOLOGY
Payer: COMMERCIAL

## 2025-02-14 ENCOUNTER — ANESTHESIA (OUTPATIENT)
Dept: OPERATING ROOM | Facility: HOSPITAL | Age: 60
End: 2025-02-14
Payer: COMMERCIAL

## 2025-02-14 VITALS
OXYGEN SATURATION: 96 % | SYSTOLIC BLOOD PRESSURE: 137 MMHG | RESPIRATION RATE: 11 BRPM | WEIGHT: 189.6 LBS | DIASTOLIC BLOOD PRESSURE: 76 MMHG | HEIGHT: 66 IN | BODY MASS INDEX: 30.47 KG/M2 | TEMPERATURE: 96.8 F | HEART RATE: 76 BPM

## 2025-02-14 DIAGNOSIS — R52 POSTPARTUM PAIN (HHS-HCC): Primary | ICD-10-CM

## 2025-02-14 DIAGNOSIS — G89.18 POSTOPERATIVE PAIN: ICD-10-CM

## 2025-02-14 DIAGNOSIS — N83.209 CYST OF OVARY, UNSPECIFIED LATERALITY: ICD-10-CM

## 2025-02-14 DIAGNOSIS — R19.00 PELVIC MASS: ICD-10-CM

## 2025-02-14 DIAGNOSIS — K59.00 CONSTIPATION, UNSPECIFIED CONSTIPATION TYPE: ICD-10-CM

## 2025-02-14 DIAGNOSIS — R11.0 POSTOPERATIVE NAUSEA: ICD-10-CM

## 2025-02-14 DIAGNOSIS — Z98.890 POSTOPERATIVE NAUSEA: ICD-10-CM

## 2025-02-14 LAB
ABO GROUP (TYPE) IN BLOOD: NORMAL
ANTIBODY SCREEN: NORMAL
RH FACTOR (ANTIGEN D): NORMAL

## 2025-02-14 PROCEDURE — 88307 TISSUE EXAM BY PATHOLOGIST: CPT | Mod: TC,SUR | Performed by: OBSTETRICS & GYNECOLOGY

## 2025-02-14 PROCEDURE — 88305 TISSUE EXAM BY PATHOLOGIST: CPT | Mod: TC,SUR | Performed by: STUDENT IN AN ORGANIZED HEALTH CARE EDUCATION/TRAINING PROGRAM

## 2025-02-14 PROCEDURE — 7100000010 HC PHASE TWO TIME - EACH INCREMENTAL 1 MINUTE: Performed by: OBSTETRICS & GYNECOLOGY

## 2025-02-14 PROCEDURE — 88112 CYTOPATH CELL ENHANCE TECH: CPT | Mod: TC,MCY | Performed by: STUDENT IN AN ORGANIZED HEALTH CARE EDUCATION/TRAINING PROGRAM

## 2025-02-14 PROCEDURE — 2500000004 HC RX 250 GENERAL PHARMACY W/ HCPCS (ALT 636 FOR OP/ED): Performed by: NURSE ANESTHETIST, CERTIFIED REGISTERED

## 2025-02-14 PROCEDURE — 88331 PATH CONSLTJ SURG 1 BLK 1SPC: CPT | Mod: TC,SUR | Performed by: STUDENT IN AN ORGANIZED HEALTH CARE EDUCATION/TRAINING PROGRAM

## 2025-02-14 PROCEDURE — 88305 TISSUE EXAM BY PATHOLOGIST: CPT | Performed by: PATHOLOGY

## 2025-02-14 PROCEDURE — 2500000005 HC RX 250 GENERAL PHARMACY W/O HCPCS: Performed by: NURSE ANESTHETIST, CERTIFIED REGISTERED

## 2025-02-14 PROCEDURE — 3700000001 HC GENERAL ANESTHESIA TIME - INITIAL BASE CHARGE: Performed by: OBSTETRICS & GYNECOLOGY

## 2025-02-14 PROCEDURE — 88112 CYTOPATH CELL ENHANCE TECH: CPT | Performed by: PATHOLOGY

## 2025-02-14 PROCEDURE — 3600000004 HC OR TIME - INITIAL BASE CHARGE - PROCEDURE LEVEL FOUR: Performed by: OBSTETRICS & GYNECOLOGY

## 2025-02-14 PROCEDURE — 2720000007 HC OR 272 NO HCPCS: Performed by: OBSTETRICS & GYNECOLOGY

## 2025-02-14 PROCEDURE — 3700000002 HC GENERAL ANESTHESIA TIME - EACH INCREMENTAL 1 MINUTE: Performed by: OBSTETRICS & GYNECOLOGY

## 2025-02-14 PROCEDURE — 2500000004 HC RX 250 GENERAL PHARMACY W/ HCPCS (ALT 636 FOR OP/ED): Performed by: OBSTETRICS & GYNECOLOGY

## 2025-02-14 PROCEDURE — 2500000001 HC RX 250 WO HCPCS SELF ADMINISTERED DRUGS (ALT 637 FOR MEDICARE OP)

## 2025-02-14 PROCEDURE — 36415 COLL VENOUS BLD VENIPUNCTURE: CPT

## 2025-02-14 PROCEDURE — 7100000001 HC RECOVERY ROOM TIME - INITIAL BASE CHARGE: Performed by: OBSTETRICS & GYNECOLOGY

## 2025-02-14 PROCEDURE — 86900 BLOOD TYPING SEROLOGIC ABO: CPT

## 2025-02-14 PROCEDURE — 88307 TISSUE EXAM BY PATHOLOGIST: CPT | Performed by: PATHOLOGY

## 2025-02-14 PROCEDURE — 2500000004 HC RX 250 GENERAL PHARMACY W/ HCPCS (ALT 636 FOR OP/ED)

## 2025-02-14 PROCEDURE — 7100000009 HC PHASE TWO TIME - INITIAL BASE CHARGE: Performed by: OBSTETRICS & GYNECOLOGY

## 2025-02-14 PROCEDURE — 58661 LAPAROSCOPY REMOVE ADNEXA: CPT | Performed by: OBSTETRICS & GYNECOLOGY

## 2025-02-14 PROCEDURE — 3600000009 HC OR TIME - EACH INCREMENTAL 1 MINUTE - PROCEDURE LEVEL FOUR: Performed by: OBSTETRICS & GYNECOLOGY

## 2025-02-14 PROCEDURE — 7100000002 HC RECOVERY ROOM TIME - EACH INCREMENTAL 1 MINUTE: Performed by: OBSTETRICS & GYNECOLOGY

## 2025-02-14 PROCEDURE — 96372 THER/PROPH/DIAG INJ SC/IM: CPT

## 2025-02-14 RX ORDER — CELECOXIB 200 MG/1
400 CAPSULE ORAL ONCE
Status: DISCONTINUED | OUTPATIENT
Start: 2025-02-14 | End: 2025-02-14 | Stop reason: HOSPADM

## 2025-02-14 RX ORDER — MEPERIDINE HYDROCHLORIDE 25 MG/ML
12.5 INJECTION INTRAMUSCULAR; INTRAVENOUS; SUBCUTANEOUS EVERY 10 MIN PRN
Status: DISCONTINUED | OUTPATIENT
Start: 2025-02-14 | End: 2025-02-14 | Stop reason: HOSPADM

## 2025-02-14 RX ORDER — NORETHINDRONE AND ETHINYL ESTRADIOL 0.5-0.035
KIT ORAL AS NEEDED
Status: DISCONTINUED | OUTPATIENT
Start: 2025-02-14 | End: 2025-02-14

## 2025-02-14 RX ORDER — OXYCODONE HYDROCHLORIDE 5 MG/1
5 TABLET ORAL EVERY 4 HOURS PRN
Status: DISCONTINUED | OUTPATIENT
Start: 2025-02-14 | End: 2025-02-14 | Stop reason: HOSPADM

## 2025-02-14 RX ORDER — DROPERIDOL 2.5 MG/ML
0.62 INJECTION, SOLUTION INTRAMUSCULAR; INTRAVENOUS ONCE AS NEEDED
Status: DISCONTINUED | OUTPATIENT
Start: 2025-02-14 | End: 2025-02-14 | Stop reason: HOSPADM

## 2025-02-14 RX ORDER — SODIUM CHLORIDE, SODIUM LACTATE, POTASSIUM CHLORIDE, CALCIUM CHLORIDE 600; 310; 30; 20 MG/100ML; MG/100ML; MG/100ML; MG/100ML
INJECTION, SOLUTION INTRAVENOUS CONTINUOUS PRN
Status: DISCONTINUED | OUTPATIENT
Start: 2025-02-14 | End: 2025-02-14

## 2025-02-14 RX ORDER — ACETAMINOPHEN 325 MG/1
975 TABLET ORAL ONCE
Status: COMPLETED | OUTPATIENT
Start: 2025-02-14 | End: 2025-02-14

## 2025-02-14 RX ORDER — ALBUTEROL SULFATE 0.83 MG/ML
2.5 SOLUTION RESPIRATORY (INHALATION) ONCE AS NEEDED
Status: DISCONTINUED | OUTPATIENT
Start: 2025-02-14 | End: 2025-02-14 | Stop reason: HOSPADM

## 2025-02-14 RX ORDER — MIDAZOLAM HYDROCHLORIDE 1 MG/ML
1 INJECTION INTRAMUSCULAR; INTRAVENOUS ONCE AS NEEDED
Status: DISCONTINUED | OUTPATIENT
Start: 2025-02-14 | End: 2025-02-14 | Stop reason: HOSPADM

## 2025-02-14 RX ORDER — OXYCODONE HYDROCHLORIDE 5 MG/1
5 TABLET ORAL EVERY 6 HOURS PRN
Qty: 15 TABLET | Refills: 0 | Status: SHIPPED | OUTPATIENT
Start: 2025-02-14

## 2025-02-14 RX ORDER — SENNOSIDES 8.6 MG/1
1 TABLET ORAL DAILY
Qty: 30 TABLET | Refills: 0 | Status: SHIPPED | OUTPATIENT
Start: 2025-02-14 | End: 2025-03-16

## 2025-02-14 RX ORDER — MIDAZOLAM HYDROCHLORIDE 1 MG/ML
INJECTION INTRAMUSCULAR; INTRAVENOUS AS NEEDED
Status: DISCONTINUED | OUTPATIENT
Start: 2025-02-14 | End: 2025-02-14

## 2025-02-14 RX ORDER — PROPOFOL 10 MG/ML
INJECTION, EMULSION INTRAVENOUS AS NEEDED
Status: DISCONTINUED | OUTPATIENT
Start: 2025-02-14 | End: 2025-02-14

## 2025-02-14 RX ORDER — KETOROLAC TROMETHAMINE 30 MG/ML
INJECTION, SOLUTION INTRAMUSCULAR; INTRAVENOUS AS NEEDED
Status: DISCONTINUED | OUTPATIENT
Start: 2025-02-14 | End: 2025-02-14

## 2025-02-14 RX ORDER — ROCURONIUM BROMIDE 10 MG/ML
INJECTION, SOLUTION INTRAVENOUS AS NEEDED
Status: DISCONTINUED | OUTPATIENT
Start: 2025-02-14 | End: 2025-02-14

## 2025-02-14 RX ORDER — LIDOCAINE HCL/PF 100 MG/5ML
SYRINGE (ML) INTRAVENOUS AS NEEDED
Status: DISCONTINUED | OUTPATIENT
Start: 2025-02-14 | End: 2025-02-14

## 2025-02-14 RX ORDER — SODIUM CHLORIDE, SODIUM LACTATE, POTASSIUM CHLORIDE, CALCIUM CHLORIDE 600; 310; 30; 20 MG/100ML; MG/100ML; MG/100ML; MG/100ML
100 INJECTION, SOLUTION INTRAVENOUS CONTINUOUS
Status: ACTIVE | OUTPATIENT
Start: 2025-02-14 | End: 2025-02-14

## 2025-02-14 RX ORDER — BUPIVACAINE HYDROCHLORIDE 5 MG/ML
INJECTION, SOLUTION PERINEURAL AS NEEDED
Status: DISCONTINUED | OUTPATIENT
Start: 2025-02-14 | End: 2025-02-14 | Stop reason: HOSPADM

## 2025-02-14 RX ORDER — ONDANSETRON HYDROCHLORIDE 2 MG/ML
4 INJECTION, SOLUTION INTRAVENOUS ONCE AS NEEDED
Status: DISCONTINUED | OUTPATIENT
Start: 2025-02-14 | End: 2025-02-14 | Stop reason: HOSPADM

## 2025-02-14 RX ORDER — GABAPENTIN 600 MG/1
600 TABLET ORAL ONCE
Status: COMPLETED | OUTPATIENT
Start: 2025-02-14 | End: 2025-02-14

## 2025-02-14 RX ORDER — FENTANYL CITRATE 50 UG/ML
25 INJECTION, SOLUTION INTRAMUSCULAR; INTRAVENOUS EVERY 5 MIN PRN
Status: DISCONTINUED | OUTPATIENT
Start: 2025-02-14 | End: 2025-02-14 | Stop reason: HOSPADM

## 2025-02-14 RX ORDER — FENTANYL CITRATE 50 UG/ML
INJECTION, SOLUTION INTRAMUSCULAR; INTRAVENOUS AS NEEDED
Status: DISCONTINUED | OUTPATIENT
Start: 2025-02-14 | End: 2025-02-14

## 2025-02-14 RX ORDER — HEPARIN SODIUM 5000 [USP'U]/ML
5000 INJECTION, SOLUTION INTRAVENOUS; SUBCUTANEOUS ONCE
Status: COMPLETED | OUTPATIENT
Start: 2025-02-14 | End: 2025-02-14

## 2025-02-14 RX ORDER — IBUPROFEN 600 MG/1
600 TABLET ORAL EVERY 6 HOURS PRN
Qty: 80 TABLET | Refills: 0 | Status: SHIPPED | OUTPATIENT
Start: 2025-02-14

## 2025-02-14 RX ORDER — IBUPROFEN 200 MG
TABLET ORAL AS NEEDED
COMMUNITY

## 2025-02-14 RX ORDER — ONDANSETRON HYDROCHLORIDE 2 MG/ML
INJECTION, SOLUTION INTRAVENOUS AS NEEDED
Status: DISCONTINUED | OUTPATIENT
Start: 2025-02-14 | End: 2025-02-14

## 2025-02-14 RX ORDER — LABETALOL HYDROCHLORIDE 5 MG/ML
5 INJECTION, SOLUTION INTRAVENOUS ONCE AS NEEDED
Status: DISCONTINUED | OUTPATIENT
Start: 2025-02-14 | End: 2025-02-14 | Stop reason: HOSPADM

## 2025-02-14 RX ORDER — ACETAMINOPHEN 500 MG
1000 TABLET ORAL EVERY 6 HOURS PRN
Qty: 80 TABLET | Refills: 0 | Status: SHIPPED | OUTPATIENT
Start: 2025-02-14

## 2025-02-14 RX ORDER — LIDOCAINE HYDROCHLORIDE 10 MG/ML
0.1 INJECTION, SOLUTION EPIDURAL; INFILTRATION; INTRACAUDAL; PERINEURAL ONCE
Status: DISCONTINUED | OUTPATIENT
Start: 2025-02-14 | End: 2025-02-14 | Stop reason: HOSPADM

## 2025-02-14 RX ORDER — ONDANSETRON 4 MG/1
4 TABLET, ORALLY DISINTEGRATING ORAL EVERY 8 HOURS PRN
Qty: 20 TABLET | Refills: 0 | Status: SHIPPED | OUTPATIENT
Start: 2025-02-14

## 2025-02-14 RX ADMIN — GABAPENTIN 600 MG: 600 TABLET, FILM COATED ORAL at 12:58

## 2025-02-14 RX ADMIN — FENTANYL CITRATE 100 MCG: 50 INJECTION, SOLUTION INTRAMUSCULAR; INTRAVENOUS at 13:53

## 2025-02-14 RX ADMIN — EPHEDRINE SULFATE 5 MG: 50 INJECTION, SOLUTION INTRAVENOUS at 14:25

## 2025-02-14 RX ADMIN — SODIUM CHLORIDE, POTASSIUM CHLORIDE, SODIUM LACTATE AND CALCIUM CHLORIDE: 600; 310; 30; 20 INJECTION, SOLUTION INTRAVENOUS at 13:45

## 2025-02-14 RX ADMIN — EPHEDRINE SULFATE 5 MG: 50 INJECTION, SOLUTION INTRAVENOUS at 15:15

## 2025-02-14 RX ADMIN — PROPOFOL 200 MG: 10 INJECTION, EMULSION INTRAVENOUS at 13:53

## 2025-02-14 RX ADMIN — HEPARIN SODIUM 5000 UNITS: 5000 INJECTION, SOLUTION INTRAVENOUS; SUBCUTANEOUS at 13:08

## 2025-02-14 RX ADMIN — SODIUM CHLORIDE, SODIUM LACTATE, POTASSIUM CHLORIDE, CALCIUM CHLORIDE: 600; 310; 30; 20 INJECTION, SOLUTION INTRAVENOUS at 13:55

## 2025-02-14 RX ADMIN — DEXAMETHASONE SODIUM PHOSPHATE 8 MG: 4 INJECTION INTRA-ARTICULAR; INTRALESIONAL; INTRAMUSCULAR; INTRAVENOUS; SOFT TISSUE at 14:10

## 2025-02-14 RX ADMIN — MIDAZOLAM HYDROCHLORIDE 2 MG: 1 INJECTION, SOLUTION INTRAMUSCULAR; INTRAVENOUS at 13:45

## 2025-02-14 RX ADMIN — SUGAMMADEX 200 MG: 100 INJECTION, SOLUTION INTRAVENOUS at 15:31

## 2025-02-14 RX ADMIN — LIDOCAINE HYDROCHLORIDE 100 MG: 20 INJECTION INTRAVENOUS at 13:53

## 2025-02-14 RX ADMIN — ONDANSETRON 4 MG: 2 INJECTION INTRAMUSCULAR; INTRAVENOUS at 15:20

## 2025-02-14 RX ADMIN — ROCURONIUM BROMIDE 50 MG: 10 INJECTION INTRAVENOUS at 13:53

## 2025-02-14 RX ADMIN — KETOROLAC TROMETHAMINE 30 MG: 30 INJECTION, SOLUTION INTRAMUSCULAR; INTRAVENOUS at 15:22

## 2025-02-14 RX ADMIN — ROCURONIUM BROMIDE 10 MG: 10 INJECTION INTRAVENOUS at 14:30

## 2025-02-14 RX ADMIN — ACETAMINOPHEN 975 MG: 325 TABLET ORAL at 12:57

## 2025-02-14 SDOH — HEALTH STABILITY: MENTAL HEALTH: CURRENT SMOKER: 0

## 2025-02-14 ASSESSMENT — COLUMBIA-SUICIDE SEVERITY RATING SCALE - C-SSRS
6. HAVE YOU EVER DONE ANYTHING, STARTED TO DO ANYTHING, OR PREPARED TO DO ANYTHING TO END YOUR LIFE?: NO
1. IN THE PAST MONTH, HAVE YOU WISHED YOU WERE DEAD OR WISHED YOU COULD GO TO SLEEP AND NOT WAKE UP?: NO
2. HAVE YOU ACTUALLY HAD ANY THOUGHTS OF KILLING YOURSELF?: NO

## 2025-02-14 ASSESSMENT — PAIN SCALES - GENERAL
PAINLEVEL_OUTOF10: 3
PAINLEVEL_OUTOF10: 2
PAINLEVEL_OUTOF10: 0 - NO PAIN
PAIN_LEVEL: 0
PAINLEVEL_OUTOF10: 0 - NO PAIN
PAINLEVEL_OUTOF10: 3

## 2025-02-14 ASSESSMENT — PAIN - FUNCTIONAL ASSESSMENT
PAIN_FUNCTIONAL_ASSESSMENT: 0-10

## 2025-02-14 ASSESSMENT — ACTIVITIES OF DAILY LIVING (ADL): EFFECT OF PAIN ON DAILY ACTIVITIES: PLEASANT

## 2025-02-14 NOTE — ANESTHESIA POSTPROCEDURE EVALUATION
Patient: Xochitl Pichardo    Procedure Summary       Date: 02/14/25 Room / Location: [facility] OR 04 / [facility] [place] [facility]    Anesthesia Start: 1345 Anesthesia Stop: 1547    Procedure: SALPINGO-OOPHORECTOMY, LAPAROSCOPIC Diagnosis:       Cyst of ovary, unspecified laterality      Pelvic mass      (Cyst of ovary, unspecified laterality [N83.209])      (Pelvic mass [R19.00])    Surgeons: Sandra Crouch MD Responsible Provider: Alejandrina Doll MD    Anesthesia Type: general ASA Status: 2            Anesthesia Type: general    Vitals Value Taken Time   /73 02/14/25 1545   Temp 36 °C (96.8 °F) 02/14/25 1545   Pulse 77 02/14/25 1545   Resp 15 02/14/25 1545   SpO2 98 02/14/25 1548       Anesthesia Post Evaluation    Patient location during evaluation: PACU  Patient participation: complete - patient participated  Level of consciousness: awake  Pain score: 0  Pain management: adequate  Airway patency: patent  Two or more strategies used to mitigate risk of obstructive sleep apnea  Cardiovascular status: acceptable  Respiratory status: acceptable  Hydration status: acceptable  Postoperative Nausea and Vomiting: none      There were no known notable events for this encounter.

## 2025-02-14 NOTE — H&P
History Of Present Illness  Xochitl Pichardo is a 59 y.o. female presenting with a pelvic mass.     Past Medical History  Past Medical History:   Diagnosis Date    Colon cancer (Multi)     Colorectal cancer (Multi)     Dental disease     chipped    Hypothyroidism     Murmur     Other specified disorders of the skin and subcutaneous tissue 04/13/2019    Morgellons syndrome    Other specified disorders of the skin and subcutaneous tissue 04/23/2019    Morgellons syndrome    Pelvic mass     Vision loss        Surgical History  Past Surgical History:   Procedure Laterality Date    COLONOSCOPY      OTHER SURGICAL HISTORY  10/14/2023    loop ostomy        Social History  She reports that she has never smoked. She has never used smokeless tobacco. She reports that she does not currently use alcohol. She reports that she does not use drugs.    Family History  Family History   Problem Relation Name Age of Onset    Parkinsonism Mother      Miranda Parkinson White syndrome Mother      Heart disease Father      Heart disease Brother      Heart disease Paternal Grandfather          Allergies  Golytely [peg 3350-electrolytes]    Review of Systems   All other systems reviewed and are negative.       Physical Exam  Constitutional:       Appearance: Normal appearance.   Cardiovascular:      Rate and Rhythm: Normal rate.   Pulmonary:      Effort: Pulmonary effort is normal.   Skin:     General: Skin is warm and dry.   Neurological:      Mental Status: She is alert.          Last Recorded Vitals  There were no vitals taken for this visit.    Relevant Results       Assessment/Plan   Assessment & Plan  Ovarian retention cyst    Pelvic mass      Patient seen in preop. No changes to medical history since consultation. Plan for laparoscopic removal of pelvic mass, possible oophorectomy, possible hysterectomy, possible staging, any other indicated procedures.        Patient seen and discussed with Dr. Crouch.   Asa Gonzalez MD

## 2025-02-14 NOTE — ANESTHESIA PREPROCEDURE EVALUATION
Patient: Xochitl Pichardo    Procedure Information       Date/Time: 02/14/25 1510    Procedure: SALPINGO-OOPHORECTOMY, LAPAROSCOPIC Possible hysterectomy, possible staging    Location: [facility] OR 04 / [facility] OR    Surgeons: Sandra Crouch MD            Relevant Problems   GI   (+) Colon cancer (Multi)      Liver   (+) Colon cancer (Multi)      Endocrine   (+) Hypothyroid       Clinical information reviewed:   Tobacco  Allergies  Meds   Med Hx  Surg Hx  OB Status  Fam Hx  Soc   Hx        NPO Detail:  No data recorded     Physical Exam    Airway  Mallampati: II  TM distance: >3 FB     Cardiovascular - normal exam  Rhythm: regular  Rate: normal     Dental - normal exam     Pulmonary - normal exam     Abdominal - normal exam  Abdomen: soft           Anesthesia Plan    History of general anesthesia?: yes  History of complications of general anesthesia?: no    ASA 2     general     The patient is not a current smoker.  Patient was previously instructed to abstain from smoking on day of procedure.  Patient did not smoke on day of procedure.  Education provided regarding risk of obstructive sleep apnea.  intravenous induction   Postoperative administration of opioids is intended.  Anesthetic plan and risks discussed with patient.  Use of blood products discussed with patient who.    Plan discussed with CRNA.

## 2025-02-14 NOTE — OP NOTE
SALPINGO-OOPHORECTOMY, LAPAROSCOPIC Operative Note     Date: 2025  OR Location: [facility] OR    Name: Xochitl Pichardo, : 1965, Age: 59 y.o., MRN: [id number], Sex: female    Diagnosis  Pre-op Diagnosis      * Cyst of ovary, unspecified laterality [N83.209]     * Pelvic mass [R19.00] Post-op Diagnosis     * Cyst of ovary, unspecified laterality [N83.209]     * Pelvic mass [R19.00]     Procedures  SALPINGO-OOPHORECTOMY, LAPAROSCOPIC  02951 - ND LAPAROSCOPY W/RMVL ADNEXAL STRUCTURES      Surgeons      * Sandra Crouch - Primary    Resident/Fellow/Other Assistant:  Surgeons and Role:     * Carri Fiore MD - Assisting     * Asa Gonzalez MD - Assisting    Staff:   Circulator: Duong  Scrub Person: Osmin  Circulator: [place]  Scrub Person: Lis  Circulator: Stephanie  Scrub Person: Miley Murray Circulator: Katlin    Anesthesia Staff: Anesthesiologist: Alejandrina Doll MD; Camilo Chu DO  CRNA: GAYATRI Hernandez-CRNA  SRNA: Ella Shearer RN  Frontline Breaker: DEMARCUS SchulteCRNA    Procedure Summary  Anesthesia: General  ASA: II  Estimated Blood Loss: 15mL  Intra-op Medications:   Administrations occurring from 1510 to 1755 on 25:   Medication Name Total Dose   BUPivacaine HCl (Marcaine) 0.5 % (5 mg/mL) injection 15 mL   ePHEDrine injection 5 mg   ketorolac (Toradol) injection 30 mg 30 mg   LR infusion Cannot be calculated   LR infusion Cannot be calculated   ondansetron (Zofran) 2 mg/mL injection 4 mg   sugammadex (Bridion) 200 mg/2 mL injection 200 mg              Anesthesia Record               Intraprocedure I/O Totals          Intake    LR infusion 1100.00 mL    Total Intake 1100 mL          Specimen:   ID Type Source Tests Collected by Time   1 : PELVIC WASHING Non-Gynecologic Cytology PELVIC WASHING CYTOLOGY CONSULTATION (NON-GYNECOLOGIC) Sandra Crouch MD 2025 [id number]   2 : LEFT FALLOPIAN TUBE AND OVARY Tissue FALLOPIAN TUBE AND OVARY LEFT SALPINGO-OOPHORECTOMY SURGICAL  PATHOLOGY EXAM Sandra Crouch MD 2/14/2025 1434   3 : RIGHT FALLOPIAN TUBE AND OVARY Tissue FALLOPIAN TUBE AND OVARY RIGHT SALPINGO-OOPHORECTOMY SURGICAL PATHOLOGY EXAM Sandra Crouch MD 2/14/2025 1451                 Drains and/or Catheters:   [REMOVED] Urethral Catheter Non-latex 16 Fr. (Removed)             Findings: Normal external genitalia. Cervix and vagina normal in appearance. Adhesions of the colon to the anterior abdominal wall along midline vertical incision. Large known left pelvic mass, approximately 10cm in largest diameter, drained for 950cc mucinous and clear fluid, FS consistent with benign mucinous neoplasm, minimal spillage into abdominal cavity. Right fallopian tube and ovary, uterus normal in appearance.     Indications: Xochitl Pichardo is an 59 y.o. female who is having surgery for Cyst of ovary, unspecified laterality [N83.209]  Pelvic mass [R19.00].     The patient was seen in the preoperative area. The risks, benefits, complications, treatment options, non-operative alternatives, expected recovery and outcomes were discussed with the patient. The possibilities of reaction to medication, pulmonary aspiration, injury to surrounding structures, bleeding, recurrent infection, the need for additional procedures, failure to diagnose a condition, and creating a complication requiring transfusion or operation were discussed with the patient. The patient concurred with the proposed plan, giving informed consent.  The site of surgery was properly noted/marked if necessary per policy. The patient has been actively warmed in preoperative area. Preoperative antibiotics are not indicated. Venous thrombosis prophylaxis have been ordered including bilateral sequential compression devices    Procedure Details:   After informed consent was obtained, the patient was taken to the operating room. General anesthesia was administered. Patient was then positioned in the dorsal lithotomy position with arms  carefully tucked. Patient was prepped and draped in the usual sterile fashion. A Liu catheter was placed. A speculum was placed, the cervix was identified, and an acorn uterine manipulator was placed. The speculum was them removed.    A vertical incision was made 5cm supraumbilically (superior to prior midline vertical incision) and carried to the fascia with hemostats and S retractors. The fascia was elevated with 2 Christina clamps and then incised with a scalpel. The fascia was then tagged with 0-vicryl suture. The peritoneum was entered blunty at this time.  The Sumit port was placed and intraperitoneal entry was confirmed under direct visualization. Pneumoperitoneum was established. Initial survey of the abdomen found it to be devoid of trauma from entry, we did note that the supra-umbilical port was just inferior to a colonic adhesion to the abdominal wall, no injury to the colon was noted. An upper abdominal survey was unremarkable.    The patient was then placed in Trendelenburg. A 5 mm port was placed in the left lower quadrant. A pelvic survey revealed the known left adnexal mass with an otherwise normal appearing uterus and right adnexa. Pelvic washings were obtained. We then placed RLQ port carefully into the mass and inflated the port balloon to control spillage. Then we placed the suction  into the port and suctioned 950cc of mucinous and clear fluid. Once we had deflated the cyst, we proceeded to identify the left ureter. This was done by opening up the left retroperitoneum with the ligasure. Once we had identified the left ureter, we isolated and sealed the left IP ligament.  The remaining uteroovarian attachments, as well as the tubal attachments, were sealed and transected. The cyst was adherent to the colonic mesentery. We proceeded to gently peel the cyst from the mesentery without injury to the mesentery.  We then exchanged the 10mm laparoscopic camera to a 5mm laparoscope and introduced  the camera through the LLQ port. We then introduced a 15mm endocatch bag and placed the left tube and ovary with the cyst wall into the bag. This was removed through the supra-umbilical port and sent to frozen section. We replaced the 10mm camera again, We then identified the right ureter transperitoneally and transected the right IP ligament. The remaining uteroovarian attachments, as well as the tubal attachments, were sealed and transected.     The 10mm laparoscope was then exchanged to a 5mm laparoscope and introduced through the left lower quadrant port. An endocatch bag was introduced through the Sumit port site and the right ovary and fallopian tube were placed within the bag. The endocatch bag and specimens were removed through the 10mm port site and sent to pathology for permanent section.     The abdomen and pelvis were then irrigated. Good hemostasis was noted. At this time the frozen section returned as benign and therefore we concluded the procedure. All instruments and trocars were removed under direct visualization. The umbilical port was closed using an additional figure of eight suture with 0-vicryl followed by the stitches previously placed. The skin of all port sites was closed with 4-0 Monocryl. Steristrips were placed.    The manipulator was removed and hemostasis was noted from the cervix. Liu catheter was removed. All counts were correct, the patient tolerated the procedure well. Dr. Crouch was present for the entire procedure. The patient was taken to PACU in stable condition.   Complications:  None; patient tolerated the procedure well.    Disposition: PACU - hemodynamically stable.  Condition: stable       Asa Gonzalez MD  Gynecologic Oncology Fellow          Additional Details: None    Attending Attestation:     Sandra Crouch  Phone Number: [phone]

## 2025-02-14 NOTE — ANESTHESIA PROCEDURE NOTES
Airway  Date/Time: 2/14/2025 1:55 PM  Urgency: elective    Airway not difficult    Staffing  Performed: JOAN   Authorized by: Camilo Chu DO    Performed by: GAYATRI Hernandez-CHIKA  Patient location during procedure: OR    Indications and Patient Condition  Indications for airway management: anesthesia  Spontaneous Ventilation: absent  Sedation level: deep  Preoxygenated: yes  Patient position: sniffing  Mask difficulty assessment: 1 - vent by mask  Planned trial extubation    Final Airway Details  Final airway type: endotracheal airway      Successful airway: ETT  Cuffed: yes   Successful intubation technique: direct laryngoscopy  Endotracheal tube insertion site: oral  Blade size: #3  ETT size (mm): 7.0  Cormack-Lehane Classification: grade I - full view of glottis  Placement verified by: chest auscultation and capnometry   Inital cuff pressure (cm H2O): 21  Measured from: lips  Number of attempts at approach: 1

## 2025-02-14 NOTE — DISCHARGE INSTRUCTIONS
Post-Operation Instructions  What care is needed at home?  Ask your doctor what you need to do when you go home. Make sure you ask questions if you do not understand what you need to do.  You can take off the bandaid covering your incision in 1-2 days.  You can shower, but do not scrub your incisions, let warm soapy water flow over them.  Do not lift things over 10 pounds (4.5 kg).  Be sure to wash your hands before and after touching your wound or dressing.  Your bowel movements may take some time to get back to normal. Eat small meals high in fiber to avoid hard stools. Drink 6 to 8 glasses of water each day.  Try to walk each day. Start by walking a little more than you did the day before. Walking boosts blood flow and helps prevent lung, belly, and blood problems.    What follow-up care is needed?  We will call you to schedule a follow up visit with your surgeon. Be sure to keep your visits.  You have stitches. They will dissolve on their own. The steri strips will fall off on its own as well.    What drugs may be needed?  We are sending you home with ibuprofen, tylenol, oxycodone (for pain), and Miralax (a stool softener).     What problems could happen?  Infection  Wound opening  Heavy blood loss  Blood clots in your legs or lungs  Damage to your bowel, bladder, and other organs inside the belly  Trouble passing bowel movements    When do I need to call the doctor?  Signs of infection such as a fever of 100.4°F (38°C) or higher, chills, pain with passing urine.  Signs of wound infection such as swelling, redness, warmth around the wound; too much pain when touched; yellowish, greenish, or bloody discharge; foul smell coming from the cut site; cut site opens up.  Excessive blood in your sanitary pads or more than six soaked pads in a day. (Spotting is normal).  Smelly green or dark yellow vaginal discharge  Upset stomach, throwing up, or very bad belly pain  Pain not helped by drugs you are taking  No bowel  movement after 3 days  If you feel the need to pass urine but urine will not come out even after 6 hours  Swelling in your leg or arm that is much greater on one side than the other      Follow up with Dr. Crouch at your scheduled appointment on 3/11/25

## 2025-02-18 LAB
LABORATORY COMMENT REPORT: NORMAL
LABORATORY COMMENT REPORT: NORMAL
PATH REPORT.FINAL DX SPEC: NORMAL
PATH REPORT.GROSS SPEC: NORMAL
PATH REPORT.RELEVANT HX SPEC: NORMAL
PATH REPORT.TOTAL CANCER: NORMAL

## 2025-02-20 LAB
LABORATORY COMMENT REPORT: NORMAL
LABORATORY COMMENT REPORT: NORMAL
Lab: NORMAL
Lab: NORMAL
PATH REPORT.FINAL DX SPEC: NORMAL
PATH REPORT.FINAL DX SPEC: NORMAL
PATH REPORT.GROSS SPEC: NORMAL
PATH REPORT.GROSS SPEC: NORMAL
PATH REPORT.RELEVANT HX SPEC: NORMAL
PATH REPORT.RELEVANT HX SPEC: NORMAL
PATH REPORT.TOTAL CANCER: NORMAL
PATH REPORT.TOTAL CANCER: NORMAL

## 2025-03-10 NOTE — PROGRESS NOTES
Patient ID: Xochitl Pichardo is a 59 y.o. female.  Referring Physician: No referring provider defined for this encounter.  Primary Care Provider: DO Tessa Barbour    Xochitl is a 59y  postmenopausal female here as a referral for a pelvic mass in the setting of a history of colon cancer. Cyst was first noted om , 3cm in size. This was also when her colonic mass was first noted. When she had surgery in , the cyst was noted to be 5cm. On a surveillance CT last month, cyst was noted to be 9.8 x 11.5 x 12.5 cm. CA-125 normal.    2/14/15 Lap BSO - benign pathology      OBHx: SVDx3, PP 9#8  GynHx:   Menarche: 13  Menopause: age 53, had regular menses prior to that, no PMB, denies any abnormal pap smear, never any contraception or anything to regulate periods   MedHx: colon cancer  Shx: sigmoid colectomy     Cyst history:   TVUS 24: Uterus 5cm x 3.8cm x 7.3cm. Retroflexed uterus. Echogenic nodule in the anterior myometrium similar the prior exam measuring 10 x 5 x 8 mm. This may represent fibroid. EL 6mm, fluid in canal. Slightly complex cystic structure in the right adnexa likely arising from the right ovary. This is approximately 12.5cm x 10.9cm x 14.6cm and demonstrates normal flow. This is larger than the prior exam. The left ovary is not seen.    CTAP 12/3/24: Dome of the bladder is indented by a midline cystic mass which measures 9.8 x 11.5 x 12.5 cm. The mass is homogeneous in density with no enhancing or solid component  noted. Right ovary is identified and appears to be separate from the mass, but a separate left ovary is not definitely identified in the mass could arise from the left ovary. Uterus is retroflexed in the cystic mass pushes on the uterus and rectosigmoid colon in addition to the bladder.    TVUS 3/8/24: The uterus measures 6.4 x 3.4 x 4.6 cm in diameter. The double wall  endometrial thickness is 2 mm. There is trace fluid in the endocervical canal. There is an ovoid 7 x 4  "mm hyperechoic mass at the lower uterine segment adjacent to the endometrium. The right ovary measures 5.5 x 4.8 x 5.7 cm in diameter.. There is a 4.7 x 5.4 x 5.5 cm cyst in the right ovary with some internal debris. There is a thin uniform wall. Doppler interrogation of the right ovary reveals normal arterial activity. The left ovary is not visualized.    CTAP 10/13/23: Interval increase in size of 5.5 cm x 3.4 center cystic lesion in the left posterior pelvis; follow-up nonemergent pelvic ultrasound is recommended for further evaluation.    CTAP 12/2022: Likely a left ovarian cyst measuring approximately 3 cm in diameter.   Doing well  No pain    No hot flashes    Eating well  Bowel movements are wnl          Objective    BSA: 2.02 meters squared  /88   Pulse 98   Temp 36.5 °C (97.7 °F) (Temporal)   Resp 18   Ht 1.676 m (5' 5.98\")   Wt 87.4 kg (192 lb 10.9 oz)   SpO2 96%   BMI 31.11 kg/m²      Physical Exam  Vitals and nursing note reviewed.   Constitutional:       Appearance: Normal appearance.   HENT:      Head: Normocephalic.   Eyes:      Pupils: Pupils are equal, round, and reactive to light.   Cardiovascular:      Rate and Rhythm: Normal rate and regular rhythm.   Pulmonary:      Effort: Pulmonary effort is normal.      Breath sounds: Normal breath sounds.   Abdominal:      General: Abdomen is flat.      Palpations: Abdomen is soft.      Tenderness: There is no abdominal tenderness.      Comments: Lap sites well healed   Musculoskeletal:         General: Normal range of motion.      Cervical back: Normal range of motion and neck supple.   Skin:     General: Skin is warm and dry.   Neurological:      Mental Status: She is alert and oriented to person, place, and time.   Psychiatric:         Mood and Affect: Mood normal.         Behavior: Behavior normal.         Performance Status:  Asymptomatic    Assessment/Plan     Oncology History    No history exists.        Problem List Items Addressed This " Visit             ICD-10-CM    Colon cancer (Multi) - Primary C18.9    Ovarian retention cyst N83.209       Treatment Plans       No treatment plans exist            Doing well post op.  No further activity restrictions.  Benign pathology reviewed.  Follow up with primary ob/gyn for well woman care.  I will see her back as needed

## 2025-03-11 ENCOUNTER — OFFICE VISIT (OUTPATIENT)
Dept: GYNECOLOGIC ONCOLOGY | Facility: HOSPITAL | Age: 60
End: 2025-03-11
Payer: COMMERCIAL

## 2025-03-11 VITALS
OXYGEN SATURATION: 96 % | SYSTOLIC BLOOD PRESSURE: 131 MMHG | BODY MASS INDEX: 30.97 KG/M2 | DIASTOLIC BLOOD PRESSURE: 88 MMHG | RESPIRATION RATE: 18 BRPM | TEMPERATURE: 97.7 F | WEIGHT: 192.68 LBS | HEART RATE: 98 BPM | HEIGHT: 66 IN

## 2025-03-11 DIAGNOSIS — N83.209 CYST OF OVARY, UNSPECIFIED LATERALITY: ICD-10-CM

## 2025-03-11 DIAGNOSIS — C18.9 MALIGNANT NEOPLASM OF COLON, UNSPECIFIED PART OF COLON (MULTI): Primary | ICD-10-CM

## 2025-03-11 PROCEDURE — 99211 OFF/OP EST MAY X REQ PHY/QHP: CPT | Performed by: OBSTETRICS & GYNECOLOGY

## 2025-03-11 PROCEDURE — 3008F BODY MASS INDEX DOCD: CPT | Performed by: OBSTETRICS & GYNECOLOGY

## 2025-03-11 ASSESSMENT — PAIN SCALES - GENERAL: PAINLEVEL_OUTOF10: 0-NO PAIN

## 2025-03-31 ENCOUNTER — APPOINTMENT (OUTPATIENT)
Dept: HEMATOLOGY/ONCOLOGY | Facility: CLINIC | Age: 60
End: 2025-03-31
Payer: COMMERCIAL

## 2025-04-04 ENCOUNTER — OFFICE VISIT (OUTPATIENT)
Dept: HEMATOLOGY/ONCOLOGY | Facility: CLINIC | Age: 60
End: 2025-04-04
Payer: COMMERCIAL

## 2025-04-04 VITALS — RESPIRATION RATE: 16 BRPM | BODY MASS INDEX: 30.1 KG/M2 | HEIGHT: 67 IN

## 2025-04-04 DIAGNOSIS — C18.7 CANCER OF SIGMOID COLON (MULTI): ICD-10-CM

## 2025-04-04 LAB
ALBUMIN SERPL BCP-MCNC: 4.3 G/DL (ref 3.4–5)
ALP SERPL-CCNC: 64 U/L (ref 33–110)
ALT SERPL W P-5'-P-CCNC: 14 U/L (ref 7–45)
ANION GAP SERPL CALC-SCNC: 11 MMOL/L (ref 10–20)
AST SERPL W P-5'-P-CCNC: 12 U/L (ref 9–39)
BASOPHILS # BLD AUTO: 0.05 X10*3/UL (ref 0–0.1)
BASOPHILS NFR BLD AUTO: 0.7 %
BILIRUB SERPL-MCNC: 0.6 MG/DL (ref 0–1.2)
BUN SERPL-MCNC: 16 MG/DL (ref 6–23)
CALCIUM SERPL-MCNC: 9.6 MG/DL (ref 8.6–10.3)
CEA SERPL-MCNC: 1 UG/L
CHLORIDE SERPL-SCNC: 106 MMOL/L (ref 98–107)
CO2 SERPL-SCNC: 25 MMOL/L (ref 21–32)
CREAT SERPL-MCNC: 0.86 MG/DL (ref 0.5–1.05)
EGFRCR SERPLBLD CKD-EPI 2021: 78 ML/MIN/1.73M*2
EOSINOPHIL # BLD AUTO: 0.22 X10*3/UL (ref 0–0.7)
EOSINOPHIL NFR BLD AUTO: 2.9 %
ERYTHROCYTE [DISTWIDTH] IN BLOOD BY AUTOMATED COUNT: 13.1 % (ref 11.5–14.5)
GLUCOSE SERPL-MCNC: 100 MG/DL (ref 74–99)
HCT VFR BLD AUTO: 44.4 % (ref 36–46)
HGB BLD-MCNC: 14.5 G/DL (ref 12–16)
IMM GRANULOCYTES # BLD AUTO: 0.02 X10*3/UL (ref 0–0.7)
IMM GRANULOCYTES NFR BLD AUTO: 0.3 % (ref 0–0.9)
LYMPHOCYTES # BLD AUTO: 1.81 X10*3/UL (ref 1.2–4.8)
LYMPHOCYTES NFR BLD AUTO: 23.7 %
MCH RBC QN AUTO: 30 PG (ref 26–34)
MCHC RBC AUTO-ENTMCNC: 32.7 G/DL (ref 32–36)
MCV RBC AUTO: 92 FL (ref 80–100)
MONOCYTES # BLD AUTO: 0.6 X10*3/UL (ref 0.1–1)
MONOCYTES NFR BLD AUTO: 7.9 %
NEUTROPHILS # BLD AUTO: 4.93 X10*3/UL (ref 1.2–7.7)
NEUTROPHILS NFR BLD AUTO: 64.5 %
PLATELET # BLD AUTO: 242 X10*3/UL (ref 150–450)
POTASSIUM SERPL-SCNC: 4.2 MMOL/L (ref 3.5–5.3)
PROT SERPL-MCNC: 7.2 G/DL (ref 6.4–8.2)
RBC # BLD AUTO: 4.84 X10*6/UL (ref 4–5.2)
SODIUM SERPL-SCNC: 138 MMOL/L (ref 136–145)
WBC # BLD AUTO: 7.6 X10*3/UL (ref 4.4–11.3)

## 2025-04-04 PROCEDURE — 85025 COMPLETE CBC W/AUTO DIFF WBC: CPT | Performed by: INTERNAL MEDICINE

## 2025-04-04 PROCEDURE — 99214 OFFICE O/P EST MOD 30 MIN: CPT | Performed by: INTERNAL MEDICINE

## 2025-04-04 PROCEDURE — 80053 COMPREHEN METABOLIC PANEL: CPT | Performed by: INTERNAL MEDICINE

## 2025-04-04 PROCEDURE — 36415 COLL VENOUS BLD VENIPUNCTURE: CPT | Performed by: INTERNAL MEDICINE

## 2025-04-04 PROCEDURE — 82378 CARCINOEMBRYONIC ANTIGEN: CPT | Mod: PORLAB | Performed by: INTERNAL MEDICINE

## 2025-04-04 ASSESSMENT — PAIN SCALES - GENERAL: PAINLEVEL_OUTOF10: 0-NO PAIN

## 2025-04-04 NOTE — PATIENT INSTRUCTIONS
Please follow up with Dr. Lepe in 6 months.    Please call [phone] with any questions or concerns.

## 2025-04-04 NOTE — PROGRESS NOTES
Xochitl Pichardo  Female, 59 y.o., 1965  MRN: [id number]      Xochitl Pichardo is a 59 y.o. female evaluated for colon cancer of sigmoid colon.  Oncology history, treatment  #1 colon cancer,   Stage IIa, pT3 pN0 M0.  MISMATCH REPAIR PROTEIN EXPRESSION  Block A4                 Protein:  Result                    MLH-1:             Expression Present                                                       PMS-2:             Expression Present                                                       MSH-2:             Expression Present                                                       MSH-6:             Expression Present    December 7, 2023, status post sigmoid colectomy and final pathology did show moderately differentiated adenocarcinoma, size 4 x 3.3 cm,     tumor was extended pericolonic tissue, no evidence of lymphovascular and perineural invasion, margins negative, 20 lymph node examined and all negative for metastatic disease.  1/25/24 , PET , 1. Postsurgical changes status post sigmoid colectomy without evidence of FDG avid local recurrence or metastatic disease. 2. Diffusely enlarged thyroid gland with intense FDG uptake which may be seen in the setting of thyroiditis.  12/3/24 , CT abdominal pelvis, cystic mass in the pelvis measuring 9.8 cm  OB/ GYN evaluation  2/14/5 ,SALPINGO-OOPHORECTOMY   , laparoscopic    Pathology, benign, left ovary with mucinous cyst adenoma, fallopian tube without any significant pathologic finding, right ovary with cortical inclusion cyst  Clinically follow-up   4/4/25    Patient doing very well no nausea vomiting no abdominal pain, no problems urination no problem with bowel movement.  Patient is active good performance status ECOG 0 other review of systems unremarkable      Subjective   Patient is a 58-year-old female who has a history of left lower abdominal pain and CAT scan of abdominal pelvis done in December 2022 patient did show a circumferential masslike thickening of the  short segment of sigmoid colon.    December 14, 2022 colonoscopy done and an obstructing sigmoid colon mass was seen biopsy nondiagnostic patient was offered surgical resection and patient refused at that time.   In October 2023, patient  presented with an obstructing sigmoid colon mass.  Patient was taken to the operating room for decompression/diversion.  Patient underwent diverting loop transverse colostomy.  October 13, 2023, CAT scan of abdominal pelvis done which did show Interval development of colonic obstruction with diffuse dilatation of the colon and transition point in the sigmoid colon at site of previously described colonic mass concerning for colonic malignancy as described on the 12/6/2022 CT examination. Interval increase in size of 5.5 cm x 3.4 center cystic lesion in the left posterior pelvis;   December 7, 2023, status post sigmoid colectomy and final pathology did show moderately differentiated adenocarcinoma, size 4 x 3.3 cm,     tumor was extended pericolonic tissue, no evidence of lymphovascular and perineural invasion, margins negative, 20 lymph node examined and all negative for metastatic disease.    Stage IIa, pT3 pN0 M0.  MISMATCH REPAIR PROTEIN EXPRESSION  Block A4                 Protein:  Result                    MLH-1:             Expression Present                                                       PMS-2:             Expression Present                                                       MSH-2:             Expression Present                                                       MSH-6:             Expression Present                                           INTERPRETATION: Neoplasm with normal mismatch repair protein expression.  Medical oncology consultation    Objective /interval history  Patient comes for follow-up visit.  Doing very well, no nausea vomiting, no abdominal pain,  3 days ago patient developed abdominal pain nausea vomiting after taking solid.  No she is feeling  "okay.  She has normal to bowel movement every day no rectal bleed.  Colonoscopy done in April 2024 was okay  ROS  Unremarkable    Past Medical History:   Diagnosis Date    Colon cancer (Multi)     Colorectal cancer (Multi)     Dental disease     chipped    Hypothyroidism     Murmur     Other specified disorders of the skin and subcutaneous tissue 04/13/2019    Morgellons syndrome    Other specified disorders of the skin and subcutaneous tissue 04/23/2019    Morgellons syndrome    Pelvic mass     Vision loss       Past Surgical History:   Procedure Laterality Date    COLONOSCOPY      OTHER SURGICAL HISTORY  10/14/2023    loop ostomy        Family History   Problem Relation Name Age of Onset    Parkinsonism Mother      Miranda Parkinson White syndrome Mother      Heart disease Father      Heart disease Brother      Heart disease Paternal Grandfather        Social History     Tobacco Use   Smoking Status Never   Smokeless Tobacco Never            No current outpatient medications on file.    Current Facility-Administered Medications:     dextrose 5 % and lactated Ringer's infusion, 100 mL/hr, intravenous, Continuous, Estrellita Nguyen MD      Last Recorded Vitals  Resp. rate 16, height 1.704 m (5' 7.09\").   Physical Exam  Vitals reviewed.   Constitutional:       Appearance: Normal appearance.   HENT:      Head: Normocephalic and atraumatic.      Nose: Nose normal.   Eyes:      Extraocular Movements: Extraocular movements intact.      Conjunctiva/sclera: Conjunctivae normal.      Pupils: Pupils are equal, round, and reactive to light.   Cardiovascular:      Rate and Rhythm: Normal rate and regular rhythm.   Pulmonary:      Effort: Pulmonary effort is normal.      Breath sounds: Normal breath sounds.   Abdominal:      General: Abdomen is flat.      Palpations: Abdomen is soft.  Slight tenderness in the right flank area, no mass       Musculoskeletal:         General: Normal range of motion.      Cervical back: Normal range " of motion and neck supple.   Lymphadenopathy:      Comments: No peripheral lymphadenopathy   Skin:     General: Skin is warm and dry.   Neurological:      General: No focal deficit present.      Mental Status: She is alert and oriented to person, place, and time.   Psychiatric:         Mood and Affect: Mood normal.   Relevant Results            Lab Results   Component Value Date    WBC 7.6 04/04/2025    HGB 14.5 04/04/2025    HCT 44.4 04/04/2025     04/04/2025    ALT 14 07/30/2024    AST 16 07/30/2024     02/05/2025    K 3.9 02/05/2025     02/05/2025    CREATININE 0.85 02/05/2025    BUN 13 02/05/2025    CO2 25 02/05/2025    TSH 5.05 (H) 11/15/2022      Assessment/Plan   #1, sigmoid colon cancer ,moderately differentiated adenocarcinoma, size 4 x 3.3 cm,    Throat muscularis propria into pericolonic tissue, no evidence of lymphovascular and perineural invasion, margins negative, 20 lymph node examined and all negative for metastatic disease.    Stage IIa, pT3 pN0 M0.   MISMATCH REPAIR PROTEIN EXPRESSION   Block A4                  Protein:  Result                     MLH-1:             Expression Present                                                        PMS-2:             Expression Present                                                        MSH-2:             Expression Present                                                        MSH-6:             Expression Present                                          1/25/24 , PET , no evidence of metastatic disease   12/3/24 , CT abdominal pelvis, cystic mass in the pelvis measuring 9.8 cm  OB/ GYN evaluation  2/14/5 ,SALPINGO-OOPHORECTOMY   , laparoscopic    Pathology, benign, left ovary with mucinous cyst adenoma, fallopian tube without any significant pathologic finding, right ovary with cortical inclusion cyst    4/4/5  Stage II colon cancer, T3 N0 M0 stage IV dissection.  Clinically follow-up.  Patient doing very well.     Ovarian cyst  status post salpingo-oophorectomy, benign pathology    Patient advised continue regular exercise reevaluation after 6 months, clinically stable, monitor CBC CMP CEA level      Time spent 30 minutes.    Celsa Lepe MD

## 2025-07-09 ENCOUNTER — HOSPITAL ENCOUNTER (OUTPATIENT)
Dept: RADIOLOGY | Facility: CLINIC | Age: 60
Discharge: HOME | End: 2025-07-09
Payer: COMMERCIAL

## 2025-07-09 DIAGNOSIS — C18.9 MALIGNANT NEOPLASM OF COLON, UNSPECIFIED PART OF COLON (MULTI): ICD-10-CM

## 2025-07-09 PROCEDURE — 2550000001 HC RX 255 CONTRASTS: Performed by: SURGERY

## 2025-07-09 PROCEDURE — 74177 CT ABD & PELVIS W/CONTRAST: CPT | Performed by: RADIOLOGY

## 2025-07-09 PROCEDURE — 74177 CT ABD & PELVIS W/CONTRAST: CPT

## 2025-07-09 RX ADMIN — IOHEXOL 75 ML: 350 INJECTION, SOLUTION INTRAVENOUS at 13:31

## 2025-07-14 ENCOUNTER — ANESTHESIA EVENT (OUTPATIENT)
Dept: GASTROENTEROLOGY | Facility: HOSPITAL | Age: 60
End: 2025-07-14
Payer: COMMERCIAL

## 2025-07-14 ENCOUNTER — HOSPITAL ENCOUNTER (OUTPATIENT)
Dept: GASTROENTEROLOGY | Facility: HOSPITAL | Age: 60
Discharge: HOME | End: 2025-07-14
Payer: COMMERCIAL

## 2025-07-14 ENCOUNTER — ANESTHESIA (OUTPATIENT)
Dept: GASTROENTEROLOGY | Facility: HOSPITAL | Age: 60
End: 2025-07-14
Payer: COMMERCIAL

## 2025-07-14 VITALS
SYSTOLIC BLOOD PRESSURE: 116 MMHG | BODY MASS INDEX: 30.13 KG/M2 | TEMPERATURE: 97.1 F | RESPIRATION RATE: 14 BRPM | DIASTOLIC BLOOD PRESSURE: 83 MMHG | OXYGEN SATURATION: 97 % | HEART RATE: 61 BPM | HEIGHT: 67 IN | WEIGHT: 192 LBS

## 2025-07-14 DIAGNOSIS — C18.9 MALIGNANT NEOPLASM OF COLON, UNSPECIFIED PART OF COLON (MULTI): ICD-10-CM

## 2025-07-14 PROCEDURE — 7100000009 HC PHASE TWO TIME - INITIAL BASE CHARGE

## 2025-07-14 PROCEDURE — 45378 DIAGNOSTIC COLONOSCOPY: CPT | Performed by: SURGERY

## 2025-07-14 PROCEDURE — 7100000010 HC PHASE TWO TIME - EACH INCREMENTAL 1 MINUTE

## 2025-07-14 PROCEDURE — 3700000001 HC GENERAL ANESTHESIA TIME - INITIAL BASE CHARGE

## 2025-07-14 PROCEDURE — 3700000002 HC GENERAL ANESTHESIA TIME - EACH INCREMENTAL 1 MINUTE

## 2025-07-14 ASSESSMENT — PAIN SCALES - GENERAL
PAINLEVEL_OUTOF10: 0 - NO PAIN
PAINLEVEL_OUTOF10: 1

## 2025-07-14 ASSESSMENT — PAIN - FUNCTIONAL ASSESSMENT: PAIN_FUNCTIONAL_ASSESSMENT: 0-10

## 2025-07-14 NOTE — ANESTHESIA POSTPROCEDURE EVALUATION
Patient: Xochitl Pichardo    Procedure Summary       Date: 07/14/25 Room / Location: [facility]    Anesthesia Start: 1232 Anesthesia Stop: 1306    Procedure: COLONOSCOPY Diagnosis: Malignant neoplasm of colon, unspecified part of colon (Multi)    Scheduled Providers: Estrellita Nguyen MD Responsible Provider: HENNA Morrison    Anesthesia Type: MAC ASA Status: 3            Anesthesia Type: MAC    Vitals Value Taken Time   /83 07/14/25 13:25   Temp 36.2 °C (97.1 °F) 07/14/25 13:25   Pulse 61 07/14/25 13:25   Resp 14 07/14/25 13:25   SpO2 97 % 07/14/25 13:25       Anesthesia Post Evaluation    Patient participation: complete - patient participated  Level of consciousness: awake and alert  Pain management: adequate  Airway patency: patent  Cardiovascular status: acceptable  Respiratory status: acceptable  Hydration status: acceptable  Postoperative Nausea and Vomiting: none        No notable events documented.

## 2025-07-14 NOTE — ANESTHESIA PREPROCEDURE EVALUATION
Patient: Xochitl Pichardo    Procedure Information       Date/Time: 07/14/25 1400    Scheduled providers: Estrellita Nguyen MD    Procedure: COLONOSCOPY    Location: [facility]            Relevant Problems   GI   (+) Colon cancer (Multi)      Liver   (+) Colon cancer (Multi)      Endocrine   (+) Hypothyroid       Clinical information reviewed:   Tobacco  Allergies  Meds   Med Hx  Surg Hx   Fam Hx  Soc Hx        NPO Detail:  NPO/Void Status  Carbohydrate Drink Given Prior to Surgery? : Y  Date of Last Liquid: 07/14/25  Time of Last Liquid: 0900  Date of Last Solid: 07/12/25  Time of Last Solid: 1800  Last Intake Type: Clear fluids  Time of Last Void: 1108         Physical Exam    Airway  Mallampati: II  TM distance: >3 FB     Cardiovascular   Rhythm: regular  Rate: normal     Dental    Pulmonary    Abdominal            Anesthesia Plan    History of general anesthesia?: yes  History of complications of general anesthesia?: no    ASA 3     MAC     Anesthetic plan and risks discussed with patient.    Plan discussed with CRNA.      
No

## 2025-07-14 NOTE — DISCHARGE INSTRUCTIONS
Patient Instructions after a Colonoscopy      The anesthetics, sedatives or narcotics which were given to you today will be acting in your body for the next 24 hours, so you might feel a little sleepy or groggy.  This feeling should slowly wear off. Carefully read and follow the instructions.     You received sedation today:  - Do not drive or operate any machinery or power tools of any kind.   - No alcoholic beverages today, not even beer or wine.  - Do not make any important decisions or sign any legal documents.  - No over the counter medications that contain alcohol or that may cause drowsiness.  - Do not make any important decisions or sign any legal documents.  - Make sure you have someone with you for first 24 hours.    While it is common to experience mild to moderate abdominal distention, gas, or belching after your procedure, if any of these symptoms occur following discharge from the GI Lab or within one week of having your procedure, call the [facility] to be advised whether a visit to your nearest Urgent Care or Emergency Department is indicated.  Take this paper with you if you go.     - If you develop an allergic reaction to the medications that were given during your procedure such as difficulty breathing, rash, hives, severe nausea, vomiting or lightheadedness.  - If you experience chest pain, shortness of breath, severe abdominal pain, fevers and chills.  -If you develop signs and symptoms of bleeding such as blood in your spit, if your stools turn black, tarry, or bloody  - If you have not urinated within 8 hours following your procedure.  - If your IV site becomes painful, red, inflamed, or looks infected.    If you received a biopsy/polypectomy/sphincterotomy the following instructions apply below:    __ Do not use Aspirin containing products, non-steroidal medications or anti-coagulants for one week following your procedure. (Examples of these types of medications are: Advil,  Arthrotec, Aleve, Coumadin, Ecotrin, Heparin, Ibuprofen, Indocin, Motrin, Naprosyn, Nuprin, Plavix, Vioxx, and Voltarin, or their generic forms.  This list is not all-inclusive.  Check with your physician or pharmacist before resuming medications.)   __ Eat a soft diet today.  Avoid foods that are poorly digested for the next 24 hours.  These foods would include: nuts, beans, lettuce, red meats, and fried foods. Start with liquids and advance your diet as tolerated, gradually work up to eating solids.   __ Do not have a Barium Study or Enema for one week.    Your physician recommends the additional following instructions:    -You have a contact number available for emergencies. The signs and symptoms of potential delayed complications were discussed with you. You may return to normal activities tomorrow.  -Resume your previous diet.  -Continue your present medications.   -We are waiting for your pathology results.  -Your physician has recommended a repeat colonoscopy (date to be determined after pending pathology results are reviewed) for surveillance based on pathology results.  -The findings and recommendations have been discussed with you.  -The findings and recommendations were discussed with your family.  - Please see Medication Reconciliation Form for new medication/medications prescribed.       If you experience any problems or have any questions following discharge from the GI Lab, please call:        Nurse Signature                                                                        Date___________________                                                                            Patient/Responsible Party Signature                                        Date___________________

## (undated) DEVICE — SUTURE, SILK, 3-0, 30 IN, MULTIPACK, BLACK

## (undated) DEVICE — GLOVE, SURGICAL, PROTEXIS PI BLUE W/NEUTHERA, 7.5, PF, LF

## (undated) DEVICE — E-TRAP, POLYP TRAP

## (undated) DEVICE — DRAPE, SHEET, ENDOSCOPY, GENERAL, FENESTRATED, ARMBOARD COVER, 98 X 123.5 IN, DISPOSABLE, LF, STERILE

## (undated) DEVICE — DRAPE, LEGGINGS, 48 X 31 IN, STERILE, LF

## (undated) DEVICE — GOWN, ASTOUND, XL

## (undated) DEVICE — PREP TRAY, SKIN, DRY, W/GLOVES

## (undated) DEVICE — POUCH KIT, NEW IMAGE, DRAINABLE, 2-1/4 IN

## (undated) DEVICE — SUTURE, VICRYL, 3-0, 27 IN, SH

## (undated) DEVICE — SUTURE, VICRYL, 2-0, 36 IN, CT-1, UNDYED

## (undated) DEVICE — Device

## (undated) DEVICE — TOWEL PACK, STERILE, 4/PACK, BLUE

## (undated) DEVICE — DRESSING, GAUZE, SPONGE, VERSALON, ALL PURPOSE, 4 X 4 IN, SOFT

## (undated) DEVICE — COVER HANDLE LIGHT, STERIS, BLUE, STERILE

## (undated) DEVICE — SUTURE, PROLENE, 0, 30 IN, CT1, BLUE

## (undated) DEVICE — SUTURE, PDSII, 1, TP-1, VIL, MONO, 48LP

## (undated) DEVICE — SUTURE, CHROMIC GUT, 2-0, SH 27IN

## (undated) DEVICE — DRAPE, SHEET, FAN FOLDED, MEDIUM, 44 X 72 IN, DISPOSABLE, LF, STERILE

## (undated) DEVICE — PREP, SCRUB, SKIN, FOAM, HIBICLENS, 4 OZ

## (undated) DEVICE — SUTURE, SILK, 2-0, 30 IN, SH, BLACK

## (undated) DEVICE — SYRINGE, 60 CC, IRRIGATION, BULB, CONTRO-BULB, PAPER POUCH

## (undated) DEVICE — SYRINGE, 50 CC, IRRIGATION, CATHETER TIP, DISPOSABLE, STERILE, LF

## (undated) DEVICE — DRAPE PACK, LAVH, W/ATTACHED LEGGINGS, W/POUCH, 100 X 114 IN, LF, STERILE

## (undated) DEVICE — ELECTRODE, ELECTROSURGICAL, BLADE, EXTENDED

## (undated) DEVICE — TUBE, SALEM SUMP, 16 FR X 48IN, ENFIT

## (undated) DEVICE — SUTURE, PDS II, 1, 27 IN, CT-1, VIOLET

## (undated) DEVICE — HOLSTER, JET SAFETY

## (undated) DEVICE — SOLUTION, IRRIGATION, STERILE WATER, 1000 ML, POUR BOTTLE

## (undated) DEVICE — DRESSING, AQUACEL AG, HYDROFIBER W/SILVER, 3.5 X 6 IN

## (undated) DEVICE — MANIFOLD, 4 PORT NEPTUNE STANDARD

## (undated) DEVICE — PAD, GROUNDING, ELECTROSURGICAL, W/9 FT CABLE, POLYHESIVE II, ADULT, LF

## (undated) DEVICE — DRESSING, NON-ADHERENT, TELFA, OUCHLESS, 3 X 8 IN, STERILE

## (undated) DEVICE — LIGASURE IMPACT, 18CM

## (undated) DEVICE — SUTURE, ETHILON, 2-0, 18 IN, FS, BLACK, BX/12

## (undated) DEVICE — SUTURE, SILK, 0, 6-30 LABYRINTH

## (undated) DEVICE — SUTURE, SILK, 1, 30 IN, LABYRINTH, BLACK

## (undated) DEVICE — DRESSING, ADHESIVE, ISLAND, TELFA, 2 X 3.75 IN, LF

## (undated) DEVICE — TUBE SET, PNEUMOCLEAR, SMOKE EVACU, HIGH-FLOW

## (undated) DEVICE — SUTURE, SILK, 3-0, 30 IN, BR SH, BLACK

## (undated) DEVICE — HANDPIECE, POOLE SUCTION, W/O TUBING

## (undated) DEVICE — PAD, GROUNDING, ELECTROSURGICAL, DUAL

## (undated) DEVICE — TROCAR, KII OPTICAL BLADELESS 5MM Z THREAD 100MM LNGTH

## (undated) DEVICE — RETRIEVAL SYSTEM, MONARCH, 10MM DISP ENDOSCOPIC

## (undated) DEVICE — TUBE, ASPIRATING, LUKI, 20CC, 6.25 IN

## (undated) DEVICE — COVER, CART, 45 X 27 X 48 IN, CLEAR

## (undated) DEVICE — TRAY, SURESTEP, URINE METER, 16FR, COMPLETE, W/STATLOCK

## (undated) DEVICE — STAPLER, SKIN, MULTIFIRE, PREMIUM, WIDE, 35 W

## (undated) DEVICE — APPLIER, LIGACLIP MULTICLIP, 20 LG CLIP 13 1/4

## (undated) DEVICE — DRAPE, UNDERBUTTOCKS, W/ 27IN FLUID POUCH

## (undated) DEVICE — SYRINGE, 20 CC, LUER LOCK, MONOJECT, W/O CAP, LF

## (undated) DEVICE — CUTTER,  PROX LINEAR, 55MM, REG TISSUE, W/ SAFETY LOCK OUT

## (undated) DEVICE — SYRINGE, 10 CC, SLIP TIP

## (undated) DEVICE — SUTURE, CHROMIC GUT, 3-0, SH 27"

## (undated) DEVICE — SUTURE, VICRYL, 0, 27 IN, UR-6, VIOLET

## (undated) DEVICE — TOWEL, SURGICAL, NEURO, O/R, 16 X 26, BLUE, STERILE

## (undated) DEVICE — TUBING, NON COND, 6MM X 20

## (undated) DEVICE — SOLUTION, PREP, PVP IODINE, FLIP TOP, 4OZ

## (undated) DEVICE — STAPLER, LINEAR, 3.5 60MM, RELOADABLE, BLUE

## (undated) DEVICE — DRESSING, TRANSPARENT, TEGADERM, 6 X 8 IN

## (undated) DEVICE — TROCAR SYSTEM, BALLOON, KII GELPORT, 12 X 100MM

## (undated) DEVICE — SOLUTION, INJECTION, STERILE WATER, 10 ML, VIAL

## (undated) DEVICE — TRAP, SPECIMEN, 40 ML

## (undated) DEVICE — DRESSING, AQUACEL AG, HYDROFIBER W/SILVER, 3.5 X 9.75 IN

## (undated) DEVICE — PACKING, IODOFORM, CURITY, 0.5 IN X 5 YD, STERILE

## (undated) DEVICE — SUTURE, SILK, 2-0, TIES, 12-30 IN, BLACK

## (undated) DEVICE — TAPE, UMBILICAL, 1/8 X 30 IN, COTTON

## (undated) DEVICE — SUTURE, MONOCRYL, 4-0, 18 IN, PS2, UNDYED

## (undated) DEVICE — SYRINGE, 30 CC, LUER LOCK

## (undated) DEVICE — DRAPE, UNDERBUTTOCKS

## (undated) DEVICE — STAPLER,  ECHELON CIRCULAR POWERED, 29MM, DISP

## (undated) DEVICE — SOLUTION, IRRIGATION, SODIUM CHLORIDE 0.9%, 1000 ML, POUR BOTTLE

## (undated) DEVICE — DRESSING, WOUND, POST OP, 10 X 4

## (undated) DEVICE — ELECTRODE, OPTI2 LAPAROSCOPIC SPATULA, CURVED

## (undated) DEVICE — DRESSING, ABDOMINAL, WET PRUF, TENDERSORB, 5 X 9 IN, STERILE

## (undated) DEVICE — LIGASURE, V SEALER/DIVIDER  5MM BLUNT TIP

## (undated) DEVICE — APPLICATOR, CHLORAPREP, W/ORANGE TINT, 26ML

## (undated) DEVICE — INSTRUMENT, PURSESTRING 65, DISPOSABLE